# Patient Record
Sex: MALE | Race: WHITE | NOT HISPANIC OR LATINO | Employment: FULL TIME | URBAN - NONMETROPOLITAN AREA
[De-identification: names, ages, dates, MRNs, and addresses within clinical notes are randomized per-mention and may not be internally consistent; named-entity substitution may affect disease eponyms.]

---

## 2019-10-04 ENCOUNTER — OFFICE VISIT (OUTPATIENT)
Dept: INTERNAL MEDICINE | Facility: CLINIC | Age: 44
End: 2019-10-04

## 2019-10-04 VITALS
WEIGHT: 171.6 LBS | OXYGEN SATURATION: 97 % | DIASTOLIC BLOOD PRESSURE: 60 MMHG | TEMPERATURE: 97.5 F | RESPIRATION RATE: 17 BRPM | HEART RATE: 66 BPM | BODY MASS INDEX: 23.24 KG/M2 | SYSTOLIC BLOOD PRESSURE: 102 MMHG | HEIGHT: 72 IN

## 2019-10-04 DIAGNOSIS — R53.83 FATIGUE, UNSPECIFIED TYPE: Primary | ICD-10-CM

## 2019-10-04 DIAGNOSIS — Z86.2 HISTORY OF PERNICIOUS ANEMIA: ICD-10-CM

## 2019-10-04 DIAGNOSIS — Z21 ASYMPTOMATIC HIV INFECTION (HCC): ICD-10-CM

## 2019-10-04 DIAGNOSIS — Z13.220 LIPID SCREENING: ICD-10-CM

## 2019-10-04 DIAGNOSIS — G47.00 INSOMNIA, UNSPECIFIED TYPE: ICD-10-CM

## 2019-10-04 PROCEDURE — 90471 IMMUNIZATION ADMIN: CPT | Performed by: FAMILY MEDICINE

## 2019-10-04 PROCEDURE — 90674 CCIIV4 VAC NO PRSV 0.5 ML IM: CPT | Performed by: FAMILY MEDICINE

## 2019-10-04 PROCEDURE — 99203 OFFICE O/P NEW LOW 30 MIN: CPT | Performed by: FAMILY MEDICINE

## 2019-10-04 NOTE — PROGRESS NOTES
Fei Ojeda is a 44 y.o. male.    Chief Complaint   Patient presents with   • HIV Positive/AIDS     Patient here to establish care, patient is HIV positive and does see an infectious disease doctor at [facility], patient states Doctos is  Carina Gilmore. Patient wanting to discuss possibly seeing a different infectious disease doctor in [place] because he doesn't know how long Dr. Gilmore will be treating him.        HPI   Patient presents today to establish care.  He does have history of HIV and has been following with Dr. Marbella Gilmore.  However, he has recently moved to [place] and may need to establish care with another physician.  He is on an assistance program with a  out of [place].  He is concerned that if he were to change providers to a [place] infectious disease doctor he would need to leave that program.  He is currently taking descovy and tivicay.    Patient also reports significant fatigue.  He does admit to difficulty sleeping.  He does report a history of pernicious anemia as well.  He does report numbness.  He also admits to palpitations and muscle cramps.    The following portions of the patient's history were reviewed and updated as appropriate: allergies, current medications, past family history, past medical history, past social history, past surgical history and problem list.     Past Medical History:   Diagnosis Date   • Acid reflux    • Depression    • Heart murmur    • HIV antibody positive (CMS/[facility])    • Hyperlipidemia        Past Surgical History:   Procedure Laterality Date   • WISDOM TOOTH EXTRACTION         Family History   Problem Relation Age of Onset   • Hypertension Mother    • Diabetes Mother    • Hyperlipidemia Mother    • Arthritis Mother    • Obesity Mother    • Osteoporosis Mother    • Thyroid disease Mother    • COPD Father    • Asthma Maternal Grandmother    • Stroke Paternal Grandmother    • Diabetes Paternal Grandmother        Social History  "    Socioeconomic History   • Marital status: Single     Spouse name: Not on file   • Number of children: Not on file   • Years of education: Not on file   • Highest education level: Not on file   Tobacco Use   • Smoking status: Never Smoker   • Smokeless tobacco: Never Used   Substance and Sexual Activity   • Alcohol use: No     Frequency: Never   • Drug use: No   • Sexual activity: Yes     Partners: Male     Birth control/protection: Condom       Allergies   Allergen Reactions   • Erythromycin GI Intolerance         Current Outpatient Medications:   •  DESCOVY 200-25 MG per tablet, , Disp: , Rfl:   •  TIVICAY 50 MG tablet, , Disp: , Rfl:     ROS    Review of Systems   Constitutional: Positive for fatigue. Negative for chills and fever.   HENT: Positive for rhinorrhea and sore throat. Negative for congestion and postnasal drip.    Eyes: Negative for blurred vision and visual disturbance.   Respiratory: Positive for shortness of breath. Negative for cough and wheezing.    Cardiovascular: Positive for palpitations. Negative for chest pain and leg swelling.   Gastrointestinal: Positive for diarrhea. Negative for abdominal pain, constipation, nausea and vomiting.   Endocrine: Negative for cold intolerance and heat intolerance.   Genitourinary: Negative for dysuria and frequency.   Musculoskeletal: Positive for myalgias. Negative for arthralgias and back pain.   Skin: Negative for color change and rash.   Allergic/Immunologic: Negative for environmental allergies.   Neurological: Positive for numbness. Negative for weakness and headache.   Hematological: Does not bruise/bleed easily.   Psychiatric/Behavioral: Positive for depressed mood. The patient is nervous/anxious.        Vitals:    10/04/19 0828   BP: 102/60   Pulse: 66   Resp: 17   Temp: 97.5 °F (36.4 °C)   SpO2: 97%   Weight: 77.8 kg (171 lb 9.6 oz)   Height: 182.9 cm (72\")     Body mass index is 23.27 kg/m².    Physical Exam     Physical Exam   Constitutional: " He is oriented to person, place, and time. He appears well-developed and well-nourished. No distress.   HENT:   Head: Normocephalic and atraumatic.   Right Ear: Tympanic membrane and external ear normal.   Left Ear: Tympanic membrane and external ear normal.   Mouth/Throat: Oropharynx is clear and moist.   Eyes: Conjunctivae and EOM are normal. Pupils are equal, round, and reactive to light.   Neck: Normal range of motion. Neck supple.   Cardiovascular: Normal rate and regular rhythm.   No murmur heard.  Pulmonary/Chest: Effort normal and breath sounds normal. No respiratory distress. He has no wheezes.   Abdominal: Soft. Bowel sounds are normal. He exhibits no distension. There is no tenderness.   Musculoskeletal: Normal range of motion. He exhibits no edema.   Lymphadenopathy:     He has no cervical adenopathy.   Neurological: He is alert and oriented to person, place, and time. No cranial nerve deficit.   Skin: Skin is warm and dry.   Psychiatric: He has a normal mood and affect. His behavior is normal.       Assessment/Plan    Problem List Items Addressed This Visit     None      Visit Diagnoses     Fatigue, unspecified type    -  Primary    Relevant Orders    CBC & Differential    Comprehensive Metabolic Panel    Vitamin B12    Vitamin D 25 Hydroxy    TSH    T4, Free    Insomnia, unspecified type        History of pernicious anemia        Relevant Orders    Vitamin B12    Lipid screening        Relevant Orders    Lipid Panel    Asymptomatic HIV infection (CMS/[facility])            Patient encouraged to try either Benadryl or 5 to 10 mg of melatonin over-the-counter for insomnia.  If no response, may place on a prescription strength medication.  Basic blood work is being done today for further evaluation of fatigue and additional symptoms.  Discussed with the patient that he should look into Dr. Bynum within [facility] for infectious disease.  It appears he may be at the [place] location and he may be able to keep his  same  at that office.    No orders of the defined types were placed in this encounter.      Orders Placed This Encounter   Procedures   • Flucelvax Quad=>4Years (PFS)       Return if symptoms worsen or fail to improve.      Roseann Keeneer, DO

## 2019-10-11 LAB
25(OH)D3+25(OH)D2 SERPL-MCNC: 22.2 NG/ML (ref 30–100)
ALBUMIN SERPL-MCNC: 4.5 G/DL (ref 3.5–5.2)
ALBUMIN/GLOB SERPL: 2.3 G/DL
ALP SERPL-CCNC: 41 U/L (ref 39–117)
ALT SERPL-CCNC: 14 U/L (ref 1–41)
AST SERPL-CCNC: 15 U/L (ref 1–40)
BASOPHILS # BLD AUTO: 0.07 10*3/MM3 (ref 0–0.2)
BASOPHILS NFR BLD AUTO: 1.3 % (ref 0–1.5)
BILIRUB SERPL-MCNC: 0.3 MG/DL (ref 0.2–1.2)
BUN SERPL-MCNC: 14 MG/DL (ref 6–20)
BUN/CREAT SERPL: 11.9 (ref 7–25)
CALCIUM SERPL-MCNC: 9.1 MG/DL (ref 8.6–10.5)
CHLORIDE SERPL-SCNC: 103 MMOL/L (ref 98–107)
CHOLEST SERPL-MCNC: 166 MG/DL (ref 0–200)
CO2 SERPL-SCNC: 29.3 MMOL/L (ref 22–29)
CREAT SERPL-MCNC: 1.18 MG/DL (ref 0.76–1.27)
EOSINOPHIL # BLD AUTO: 0.15 10*3/MM3 (ref 0–0.4)
EOSINOPHIL NFR BLD AUTO: 2.9 % (ref 0.3–6.2)
ERYTHROCYTE [DISTWIDTH] IN BLOOD BY AUTOMATED COUNT: 12.4 % (ref 12.3–15.4)
GLOBULIN SER CALC-MCNC: 2 GM/DL
GLUCOSE SERPL-MCNC: 103 MG/DL (ref 65–99)
HCT VFR BLD AUTO: 38.5 % (ref 37.5–51)
HDLC SERPL-MCNC: 63 MG/DL (ref 40–60)
HGB BLD-MCNC: 13 G/DL (ref 13–17.7)
IMM GRANULOCYTES # BLD AUTO: 0 10*3/MM3 (ref 0–0.05)
IMM GRANULOCYTES NFR BLD AUTO: 0 % (ref 0–0.5)
LDLC SERPL CALC-MCNC: 78 MG/DL (ref 0–100)
LYMPHOCYTES # BLD AUTO: 2.18 10*3/MM3 (ref 0.7–3.1)
LYMPHOCYTES NFR BLD AUTO: 42 % (ref 19.6–45.3)
MCH RBC QN AUTO: 32.2 PG (ref 26.6–33)
MCHC RBC AUTO-ENTMCNC: 33.8 G/DL (ref 31.5–35.7)
MCV RBC AUTO: 95.3 FL (ref 79–97)
MONOCYTES # BLD AUTO: 0.66 10*3/MM3 (ref 0.1–0.9)
MONOCYTES NFR BLD AUTO: 12.7 % (ref 5–12)
NEUTROPHILS # BLD AUTO: 2.13 10*3/MM3 (ref 1.7–7)
NEUTROPHILS NFR BLD AUTO: 41.1 % (ref 42.7–76)
NRBC BLD AUTO-RTO: 0 /100 WBC (ref 0–0.2)
PLATELET # BLD AUTO: 247 10*3/MM3 (ref 140–450)
POTASSIUM SERPL-SCNC: 5.1 MMOL/L (ref 3.5–5.2)
PROT SERPL-MCNC: 6.5 G/DL (ref 6–8.5)
RBC # BLD AUTO: 4.04 10*6/MM3 (ref 4.14–5.8)
SODIUM SERPL-SCNC: 143 MMOL/L (ref 136–145)
T4 FREE SERPL-MCNC: 1.12 NG/DL (ref 0.93–1.7)
TRIGL SERPL-MCNC: 123 MG/DL (ref 0–150)
TSH SERPL DL<=0.005 MIU/L-ACNC: 2.55 UIU/ML (ref 0.27–4.2)
VIT B12 SERPL-MCNC: 298 PG/ML (ref 211–946)
VLDLC SERPL CALC-MCNC: 24.6 MG/DL
WBC # BLD AUTO: 5.19 10*3/MM3 (ref 3.4–10.8)

## 2019-10-16 DIAGNOSIS — R91.1 LUNG NODULE: Primary | ICD-10-CM

## 2019-10-17 ENCOUNTER — TELEPHONE (OUTPATIENT)
Dept: INTERNAL MEDICINE | Facility: CLINIC | Age: 44
End: 2019-10-17

## 2019-10-17 DIAGNOSIS — I83.93 VARICOSE VEINS OF BOTH LOWER EXTREMITIES, UNSPECIFIED WHETHER COMPLICATED: Primary | ICD-10-CM

## 2019-10-17 NOTE — TELEPHONE ENCOUNTER
Patient would like to try to get appointments with both Dr. Osei and Dr. Messina for the week of Dec. 16-20.  I relayed that it can take several months to obtain an appointment with either provider and we would do our best to accommodate.

## 2019-12-18 ENCOUNTER — OFFICE VISIT (OUTPATIENT)
Dept: PULMONOLOGY | Facility: CLINIC | Age: 44
End: 2019-12-18

## 2019-12-18 VITALS
HEIGHT: 72 IN | HEART RATE: 64 BPM | DIASTOLIC BLOOD PRESSURE: 68 MMHG | WEIGHT: 159 LBS | SYSTOLIC BLOOD PRESSURE: 112 MMHG | OXYGEN SATURATION: 95 % | BODY MASS INDEX: 21.54 KG/M2 | RESPIRATION RATE: 16 BRPM

## 2019-12-18 DIAGNOSIS — R91.1 LUNG NODULE, SOLITARY: Primary | ICD-10-CM

## 2019-12-18 DIAGNOSIS — R93.89 ABNORMAL CT OF THE CHEST: ICD-10-CM

## 2019-12-18 PROCEDURE — 99204 OFFICE O/P NEW MOD 45 MIN: CPT | Performed by: INTERNAL MEDICINE

## 2019-12-18 RX ORDER — FLUCONAZOLE 200 MG/1
TABLET ORAL
COMMUNITY
Start: 2019-12-17 | End: 2020-01-18

## 2019-12-18 RX ORDER — ATENOLOL 25 MG/1
TABLET ORAL
COMMUNITY
End: 2020-01-18

## 2019-12-18 RX ORDER — BICTEGRAVIR SODIUM, EMTRICITABINE, AND TENOFOVIR ALAFENAMIDE FUMARATE 50; 200; 25 MG/1; MG/1; MG/1
TABLET ORAL
COMMUNITY
Start: 2019-12-04 | End: 2022-07-23 | Stop reason: SDUPTHER

## 2019-12-18 NOTE — PROGRESS NOTES
CONSULT NOTE    Requested by:   Roseann Harris, *   Roseann Harris, DO      Chief Complaint   Patient presents with   • Consult   • Breathing Problem       Subjective:  eFi Ojeda is a 44 y.o. male.     History of Present Illness   Patient comes in today for consultation because of abnormal CT.    Patient underwent a CT due to shortness of breath and other vague symptoms. he was told that the imaging study showed a 3 and another 5 millimeter lung nodule for which a pulmonology consultation was requested.    He has had 2 CT scans since 2017. One in [place] in early 2018.     Patient says that he is a non-smoker. He used to smoke 1 PPD or so, for total of 10 years but quit smoking in 2017. The patient describes no family members with a history of lung cancer.        The following portions of the patient's history were reviewed and updated as appropriate: allergies, current medications, past family history, past medical history, past social history and past surgical history.    Review of Systems   Constitutional: Negative for chills, fatigue and fever.   HENT: Positive for ear pain, postnasal drip, sinus pressure and tinnitus. Negative for sneezing and sore throat.    Respiratory: Positive for chest tightness. Negative for cough, shortness of breath and wheezing.    Cardiovascular: Positive for palpitations. Negative for leg swelling.   Psychiatric/Behavioral: Positive for sleep disturbance.   All other systems reviewed and are negative.      Past Medical History:   Diagnosis Date   • Acid reflux    • Depression    • Heart murmur    • HIV antibody positive (CMS/[facility])    • Hyperlipidemia        Social History     Tobacco Use   • Smoking status: Former Smoker     Packs/day: 2.00     Years: 7.00     Pack years: 14.00     Types: Cigarettes     Last attempt to quit: 2017     Years since quittin.9   • Smokeless tobacco: Never Used   Substance Use Topics   • Alcohol use: No     Frequency: Never  "        Objective:  Visit Vitals  /68   Pulse 64   Resp 16   Ht 182.9 cm (72.01\")   Wt 72.1 kg (159 lb)   SpO2 95%   BMI 21.56 kg/m²       Physical Exam   Constitutional: He is oriented to person, place, and time. He appears well-developed and well-nourished.   HENT:   Head: Atraumatic.   Eyes: EOM are normal.   Neck: Neck supple. No JVD present. No thyromegaly present.   Cardiovascular: Normal rate and regular rhythm.   No murmur heard.  Pulmonary/Chest: Effort normal. No respiratory distress. He has no wheezes. He has no rales.   Musculoskeletal:   Gait was normal.   Neurological: He is alert and oriented to person, place, and time.   Skin: Skin is warm and dry.   Psychiatric: He has a normal mood and affect. His behavior is normal.   Vitals reviewed.      Assessment/Plan:  Fei was seen today for consult and breathing problem.    Diagnoses and all orders for this visit:    Lung nodule, solitary  -     CT Chest Without Contrast; Future    Abnormal CT of the chest  -     CT Chest Without Contrast; Future        Return in about 7 weeks (around 2/5/2020) for Recheck, Imaging, For Trudy (7-9 weeks is OK).    DISCUSSION(if any):  Last CT scan was reviewed in great detail with the patient. Images reviewed personally.       Based on the history obtained today, and based on the latest guidelines, the patient will need a repeat CT chest in Late Jan 2020.      Based on the results of the CT scan, further recommendations will be made.    The patient was asked to call this office if he develops any weight loss, hemoptysis, night sweats etc.        Dictated utilizing Dragon dictation.    This document was electronically signed by Emma Osei MD on 12/18/19 at 10:05 AM    "

## 2020-01-03 ENCOUNTER — TELEPHONE (OUTPATIENT)
Dept: INTERNAL MEDICINE | Facility: CLINIC | Age: 45
End: 2020-01-03

## 2020-01-03 DIAGNOSIS — I83.93 VARICOSE VEINS OF BOTH LOWER EXTREMITIES, UNSPECIFIED WHETHER COMPLICATED: Primary | ICD-10-CM

## 2020-01-03 NOTE — TELEPHONE ENCOUNTER
"I just took a call from Celina at [facility].  She states that they need a referral for an Ultrasound of the Lower Extremities for Vericose Veins.  You had previously placed a referral to Dr. Frazier (at the patient's request) however, he is \"not taking any new patients\" so they were going to do the patient's Ultrasound and refer him on.  I told Celina that I would have to let you know this information and find out if you would be willing to order an Ultrasound for the patient, especially since you didn't evaluate him for this issue and placed the referral to [facility] at the patient's request.  Celina states she is going to call the patient and let him know that they would have to wait on our order, as patient had been scheduled for Ultrasound on Monday (1/6/20).  "

## 2020-01-07 NOTE — TELEPHONE ENCOUNTER
So they did not order the ultrasound?  I went ahead and placed the order.  It could be done here if he would prefer.  Just clarifying, they are referring him to another provider accepting patients?

## 2020-01-08 NOTE — TELEPHONE ENCOUNTER
So my understanding is that Dr. Frazier is basically screening patients by doing ultrasounds to determine if there is any need for scheduling an appointment or not.  At that point they are either scheduling the patient with Dr. Frazier or referring out to another physician, I'm not sure.

## 2020-01-09 ENCOUNTER — TELEPHONE (OUTPATIENT)
Dept: INTERNAL MEDICINE | Facility: CLINIC | Age: 45
End: 2020-01-09

## 2020-01-09 NOTE — TELEPHONE ENCOUNTER
PATIENT CALLED IN REGARDS TO VASCULAR REFERRAL TO [facility].  NEEDS TO REFLECT AN ULTRA SOUND WITH DR. ROACH.     PATIENT CALL BACK NUMBER [phone]

## 2020-01-18 ENCOUNTER — OFFICE VISIT (OUTPATIENT)
Dept: INTERNAL MEDICINE | Facility: CLINIC | Age: 45
End: 2020-01-18

## 2020-01-18 ENCOUNTER — PATIENT MESSAGE (OUTPATIENT)
Dept: INTERNAL MEDICINE | Facility: CLINIC | Age: 45
End: 2020-01-18

## 2020-01-18 VITALS
HEART RATE: 77 BPM | SYSTOLIC BLOOD PRESSURE: 104 MMHG | DIASTOLIC BLOOD PRESSURE: 64 MMHG | OXYGEN SATURATION: 97 % | BODY MASS INDEX: 21.94 KG/M2 | HEIGHT: 72 IN | WEIGHT: 162 LBS | TEMPERATURE: 98.8 F

## 2020-01-18 DIAGNOSIS — Z11.1 ENCOUNTER FOR PPD TEST: Primary | ICD-10-CM

## 2020-01-18 DIAGNOSIS — J30.9 CHRONIC ALLERGIC RHINITIS: Primary | ICD-10-CM

## 2020-01-18 DIAGNOSIS — L98.9 LESION OF SKIN OF NOSE: ICD-10-CM

## 2020-01-18 PROCEDURE — 99214 OFFICE O/P EST MOD 30 MIN: CPT | Performed by: FAMILY MEDICINE

## 2020-01-18 RX ORDER — METRONIDAZOLE 10 MG/G
GEL TOPICAL DAILY
Qty: 60 G | Refills: 0 | Status: SHIPPED | OUTPATIENT
Start: 2020-01-18 | End: 2020-01-20 | Stop reason: SDUPTHER

## 2020-01-18 RX ORDER — LEVOCETIRIZINE DIHYDROCHLORIDE 5 MG/1
5 TABLET, FILM COATED ORAL EVERY EVENING
Qty: 30 TABLET | Refills: 2 | Status: SHIPPED | OUTPATIENT
Start: 2020-01-18 | End: 2020-04-17

## 2020-01-18 RX ORDER — OLOPATADINE HYDROCHLORIDE 665 UG/1
2 SPRAY NASAL 2 TIMES DAILY
Qty: 1 BOTTLE | Refills: 2 | Status: SHIPPED | OUTPATIENT
Start: 2020-01-18 | End: 2020-04-17

## 2020-01-18 NOTE — PROGRESS NOTES
Fei Ojeda is a 44 y.o. male.    Chief Complaint   Patient presents with   • Rash     on nose       HPI   Patient reports severe sinus drainage for a couple of months.  He reports he is always had sinus issues.  He admits to sinus pressure and ear fullness.  He states that he recently took a round of antibiotics and steroids without much improvement.  He reports being miserable.  He has tried multiple allergy medications including Flonase, Xyzal, Claritin, Allegra, Sudafed, Zyrtec, Astelin, and Singulair.  He reports that he has tried combinations of these medications.      He reports a redness to the tip of his left nare.  This is been present for the last several weeks.  He has been using Bactroban ointment without any improvement.  As above, he also took a round of antibiotics recently without any improvement.    The following portions of the patient's history were reviewed and updated as appropriate: allergies, current medications, past family history, past medical history, past social history, past surgical history and problem list.     Allergies   Allergen Reactions   • Erythromycin GI Intolerance         Current Outpatient Medications:   •  BIKTARVY -25 MG per tablet, , Disp: , Rfl:   •  mupirocin (BACTROBAN) 2 % ointment, Apply  topically to the appropriate area as directed 3 (Three) Times a Day., Disp: , Rfl:   •  levocetirizine (XYZAL) 5 MG tablet, Take 1 tablet by mouth Every Evening., Disp: 30 tablet, Rfl: 2  •  metroNIDAZOLE (METROGEL) 1 % gel, Apply  topically to the appropriate area as directed Daily., Disp: 60 g, Rfl: 0  •  olopatadine (PATANASE) 0.6 % solution nasal solution, 2 sprays by Each Nare route 2 (Two) Times a Day., Disp: 1 bottle, Rfl: 2    ROS    Review of Systems   Constitutional: Negative for chills and fever.   HENT: Positive for congestion, postnasal drip and sinus pressure.    Respiratory: Negative for shortness of breath.    Cardiovascular: Negative for chest pain.  "  Gastrointestinal: Negative for abdominal pain, diarrhea, nausea and vomiting.   Skin: Positive for skin lesions.       Vitals:    01/18/20 0918   BP: 104/64   BP Location: Left arm   Patient Position: Sitting   Cuff Size: Adult   Pulse: 77   Temp: 98.8 °F (37.1 °C)   TempSrc: Temporal   SpO2: 97%   Weight: 73.5 kg (162 lb)   Height: 182.9 cm (72\")     Body mass index is 21.97 kg/m².    Physical Exam     Physical Exam   Constitutional: He is oriented to person, place, and time. He appears well-developed and well-nourished. No distress.   HENT:   Head: Normocephalic and atraumatic.   Right Ear: Tympanic membrane and external ear normal.   Left Ear: Tympanic membrane and external ear normal.   Mouth/Throat: Posterior oropharyngeal erythema (Trace postnasal drip) present.   Eyes: Conjunctivae and EOM are normal.   Cardiovascular: Normal rate and regular rhythm.   No murmur heard.  Pulmonary/Chest: Effort normal and breath sounds normal. No respiratory distress. He has no wheezes.   Abdominal: Soft. Bowel sounds are normal. He exhibits no distension. There is no tenderness.   Neurological: He is alert and oriented to person, place, and time. No cranial nerve deficit.   Skin: Skin is warm and dry. There is erythema (Small erythematous area to the tip of the nose on the left.  Telangiectasias present.).   Psychiatric: He has a normal mood and affect.       Assessment/Plan    Problem List Items Addressed This Visit        Respiratory    Chronic allergic rhinitis - Primary     Patient has had chronic sinus congestion.  Will refer to ENT for further evaluation and treatment.  In the meantime, patient has been advised to take Xyzal and Patanase nasal spray.         Relevant Orders    Ambulatory Referral to ENT (Otolaryngology) (Completed)       Musculoskeletal and Integument    Lesion of skin of nose     Skin lesion appears to be similar to rosacea.  Will treat with topical MetroGel.         Relevant Medications    mupirocin " (BACTROBAN) 2 % ointment    metroNIDAZOLE (METROGEL) 1 % gel          New Medications Ordered This Visit   Medications   • metroNIDAZOLE (METROGEL) 1 % gel     Sig: Apply  topically to the appropriate area as directed Daily.     Dispense:  60 g     Refill:  0   • olopatadine (PATANASE) 0.6 % solution nasal solution     Si sprays by Each Nare route 2 (Two) Times a Day.     Dispense:  1 bottle     Refill:  2   • levocetirizine (XYZAL) 5 MG tablet     Sig: Take 1 tablet by mouth Every Evening.     Dispense:  30 tablet     Refill:  2       No orders of the defined types were placed in this encounter.      Return if symptoms worsen or fail to improve.    Roseann Harris, DO

## 2020-01-18 NOTE — ASSESSMENT & PLAN NOTE
Patient has had chronic sinus congestion.  Will refer to ENT for further evaluation and treatment.  In the meantime, patient has been advised to take Xyzal and Patanase nasal spray.

## 2020-01-20 ENCOUNTER — TELEPHONE (OUTPATIENT)
Dept: INTERNAL MEDICINE | Facility: CLINIC | Age: 45
End: 2020-01-20

## 2020-01-20 RX ORDER — METRONIDAZOLE 10 MG/G
GEL TOPICAL
Qty: 60 G | Refills: 0 | Status: SHIPPED | OUTPATIENT
Start: 2020-01-20 | End: 2022-07-06

## 2020-01-20 NOTE — TELEPHONE ENCOUNTER
Gerri Bowles left a VM 1/18/2020 at 10AM requesting update on RX for metrogel, states they need to know where this is going to be applied at so that they can bill insurance.   Resent.

## 2020-01-21 NOTE — TELEPHONE ENCOUNTER
From: Fei Ojeda  To: Roseann Harris DO  Sent: 1/18/2020 7:55 PM EST  Subject: Non-Urgent Medical Question    Dr. Harris:    I meant to ask you at my visit today 01/18/2020 if I could get my TB skin test. I have not had one for a couple of years and wondered if you would order one for me. Let me know. Thank you.

## 2020-01-27 ENCOUNTER — HOSPITAL ENCOUNTER (OUTPATIENT)
Dept: CT IMAGING | Facility: HOSPITAL | Age: 45
Discharge: HOME | End: 2020-01-27
Admitting: INTERNAL MEDICINE

## 2020-01-27 ENCOUNTER — CLINICAL SUPPORT (OUTPATIENT)
Dept: INTERNAL MEDICINE | Facility: CLINIC | Age: 45
End: 2020-01-27

## 2020-01-27 DIAGNOSIS — R93.89 ABNORMAL CT OF THE CHEST: ICD-10-CM

## 2020-01-27 DIAGNOSIS — R91.1 LUNG NODULE, SOLITARY: ICD-10-CM

## 2020-01-27 PROCEDURE — 71250 CT THORAX DX C-: CPT

## 2020-01-27 PROCEDURE — 86580 TB INTRADERMAL TEST: CPT | Performed by: FAMILY MEDICINE

## 2020-01-29 ENCOUNTER — CLINICAL SUPPORT (OUTPATIENT)
Dept: INTERNAL MEDICINE | Facility: CLINIC | Age: 45
End: 2020-01-29

## 2020-01-29 LAB
INDURATION: 0 MM (ref 0–10)
Lab: NORMAL
Lab: NORMAL
TB SKIN TEST: NEGATIVE

## 2020-02-03 ENCOUNTER — TELEPHONE (OUTPATIENT)
Dept: INTERNAL MEDICINE | Facility: CLINIC | Age: 45
End: 2020-02-03

## 2020-02-03 ENCOUNTER — OFFICE VISIT (OUTPATIENT)
Dept: INTERNAL MEDICINE | Facility: CLINIC | Age: 45
End: 2020-02-03

## 2020-02-03 VITALS
WEIGHT: 164.8 LBS | HEART RATE: 58 BPM | OXYGEN SATURATION: 98 % | BODY MASS INDEX: 22.32 KG/M2 | DIASTOLIC BLOOD PRESSURE: 54 MMHG | TEMPERATURE: 98.4 F | HEIGHT: 72 IN | SYSTOLIC BLOOD PRESSURE: 98 MMHG

## 2020-02-03 DIAGNOSIS — I83.813 VARICOSE VEINS OF BOTH LOWER EXTREMITIES WITH PAIN: ICD-10-CM

## 2020-02-03 DIAGNOSIS — I83.93 VARICOSE VEINS OF BOTH LOWER EXTREMITIES, UNSPECIFIED WHETHER COMPLICATED: Primary | ICD-10-CM

## 2020-02-03 DIAGNOSIS — F41.9 ANXIETY: Primary | ICD-10-CM

## 2020-02-03 DIAGNOSIS — R00.2 PALPITATIONS: ICD-10-CM

## 2020-02-03 DIAGNOSIS — R07.89 OTHER CHEST PAIN: ICD-10-CM

## 2020-02-03 PROCEDURE — 99214 OFFICE O/P EST MOD 30 MIN: CPT | Performed by: FAMILY MEDICINE

## 2020-02-03 RX ORDER — PROPRANOLOL HYDROCHLORIDE 10 MG/1
10 TABLET ORAL 3 TIMES DAILY PRN
Qty: 90 TABLET | Refills: 0 | Status: SHIPPED | OUTPATIENT
Start: 2020-02-03 | End: 2022-07-06

## 2020-02-03 NOTE — PROGRESS NOTES
Fei Ojeda is a 44 y.o. male.    Chief Complaint   Patient presents with   • Chest Tightness       HPI   Patient presents today to f/u from a visit to the urgent care.  Patient reports chest tightness and pressure upon waking yesterday.  He states while he was in the shower his heart began racing and had palpitations. He reports shortness of breath.  He reports he went to the urgent care. He reports EKG was normal along with normal CXR.  He reports symptoms subsided after leaving the .  He had elevated heart rate this am in the shower as well.  He states he doesn't necessarily feel nervous, but is starting a new job as a  with the [employer].      Patient also has varicose veins.  He was referred to Dr. Mitchell with vascular surgery at the patient's request. Dr. Mitchell is retiring and asked for an ultrasound to be ordered and he would read it.  This clinic was informed by the vascular office that the patient would be referred by their office to another location depending on the read from the ultrasound.  The patient was able to read his results on his patient portal, but no ne from the vascular office has called the patient to go over the results or discuss how they were going to proceed. The patient is aware his venous ultrasound of the lower extremities is abnormal bilaterally.     The following portions of the patient's history were reviewed and updated as appropriate: allergies, current medications, past family history, past medical history, past social history, past surgical history and problem list.     Allergies   Allergen Reactions   • Erythromycin GI Intolerance         Current Outpatient Medications:   •  BIKTARVY -25 MG per tablet, , Disp: , Rfl:   •  levocetirizine (XYZAL) 5 MG tablet, Take 1 tablet by mouth Every Evening., Disp: 30 tablet, Rfl: 2  •  metroNIDAZOLE (METROGEL) 1 % gel, Apply topically to nose daily, Disp: 60 g, Rfl: 0  •  mupirocin (BACTROBAN) 2 % ointment, Apply   "topically to the appropriate area as directed 3 (Three) Times a Day., Disp: , Rfl:   •  olopatadine (PATANASE) 0.6 % solution nasal solution, 2 sprays by Each Nare route 2 (Two) Times a Day., Disp: 1 bottle, Rfl: 2  •  propranolol (INDERAL) 10 MG tablet, Take 1 tablet by mouth 3 (Three) Times a Day As Needed (anxiety, palpitations)., Disp: 90 tablet, Rfl: 0    ROS    Review of Systems   Constitutional: Negative for chills and fever.   HENT: Negative for congestion and rhinorrhea.    Respiratory: Positive for shortness of breath. Negative for cough and wheezing.    Cardiovascular: Positive for chest pain and palpitations.   Gastrointestinal: Negative for abdominal pain, diarrhea, nausea and vomiting.   Musculoskeletal: Positive for myalgias.   Psychiatric/Behavioral: Positive for stress. The patient is nervous/anxious.        Vitals:    02/03/20 1100   BP: 98/54   BP Location: Left arm   Patient Position: Sitting   Cuff Size: Adult   Pulse: 58   Temp: 98.4 °F (36.9 °C)   TempSrc: Temporal   SpO2: 98%   Weight: 74.8 kg (164 lb 12.8 oz)   Height: 182.9 cm (72\")     Body mass index is 22.35 kg/m².    Physical Exam     Physical Exam   Constitutional: He is oriented to person, place, and time. He appears well-developed and well-nourished. No distress.   HENT:   Head: Normocephalic and atraumatic.   Right Ear: External ear normal.   Left Ear: External ear normal.   Eyes: Conjunctivae and EOM are normal.   Cardiovascular: Normal rate and regular rhythm.   No murmur heard.  Pulmonary/Chest: Effort normal and breath sounds normal. No respiratory distress. He has no wheezes.   Abdominal: Soft. Bowel sounds are normal. He exhibits no distension. There is no tenderness.   Musculoskeletal: He exhibits no edema.   Neurological: He is alert and oriented to person, place, and time. No cranial nerve deficit.   Skin: Skin is warm and dry.   Psychiatric: He has a normal mood and affect. His behavior is normal. "       Assessment/Plan    Problem List Items Addressed This Visit        Cardiovascular and Mediastinum    Palpitations    Varicose veins of both lower extremities with pain       Nervous and Auditory    Other chest pain       Other    Anxiety - Primary        Discussed with patient chest pain and palpitations are likely secondary to underlying anxiety.  I do believe the patient is having anxiety attacks.  EKG in UC was unremarkable.  Advised to try propranolol prn if the episodes should occur again.  If no response, may consider holter monitor to further evaluated.      Vascular ultrasound has been reviewed and discussed with the patient. Patient is also being referred to  vascular surgery for further evaluation and treatment of varicose veins and venous insufficiency (based off recent lower extremity ultrasound).    New Medications Ordered This Visit   Medications   • propranolol (INDERAL) 10 MG tablet     Sig: Take 1 tablet by mouth 3 (Three) Times a Day As Needed (anxiety, palpitations).     Dispense:  90 tablet     Refill:  0       No orders of the defined types were placed in this encounter.      No follow-ups on file.    Roseann Harris, DO

## 2020-02-03 NOTE — TELEPHONE ENCOUNTER
We received a call back from Jailene at [facility].  Per Jailene, patient was given a disc of the abnormal Lower Vascular Duplex and told to talk to someone at .  But states that there was no formal referral placed for the patient to see anyone.

## 2020-02-03 NOTE — TELEPHONE ENCOUNTER
Called [facility] and left message on voicemail of Jailene Stallings (Clinical Staff for Dr. Frazier).  Requested a call back with information regarding whether patient had been referred elsewhere following his abnormal Lower Venous Duplex.

## 2020-02-04 NOTE — TELEPHONE ENCOUNTER
It seems so.  I wasn't sure if the patient may have said anything about what they had instructed him to do.  But yes, I'd say the next step would be to place a referral, probably [facility] would be the best option.

## 2020-02-05 NOTE — TELEPHONE ENCOUNTER
Called patient with no answer, left a voicemail informing patient that a new referral has been placed for vascular surgery at

## 2020-02-09 PROBLEM — R07.89 OTHER CHEST PAIN: Status: ACTIVE | Noted: 2020-02-09

## 2020-02-09 PROBLEM — I83.813 VARICOSE VEINS OF BOTH LOWER EXTREMITIES WITH PAIN: Status: ACTIVE | Noted: 2020-02-09

## 2020-02-10 ENCOUNTER — OFFICE VISIT (OUTPATIENT)
Dept: PULMONOLOGY | Facility: CLINIC | Age: 45
End: 2020-02-10

## 2020-02-10 VITALS
BODY MASS INDEX: 22.21 KG/M2 | WEIGHT: 164 LBS | DIASTOLIC BLOOD PRESSURE: 68 MMHG | HEART RATE: 56 BPM | RESPIRATION RATE: 18 BRPM | SYSTOLIC BLOOD PRESSURE: 112 MMHG | HEIGHT: 72 IN | OXYGEN SATURATION: 98 %

## 2020-02-10 DIAGNOSIS — R93.89 ABNORMAL CT OF THE CHEST: ICD-10-CM

## 2020-02-10 DIAGNOSIS — R91.1 LUNG NODULE, SOLITARY: Primary | ICD-10-CM

## 2020-02-10 PROCEDURE — 99213 OFFICE O/P EST LOW 20 MIN: CPT | Performed by: NURSE PRACTITIONER

## 2020-02-10 NOTE — PROGRESS NOTES
"Chief Complaint   Patient presents with   • Follow-up   • Breathing Problem         Subjective   Fei Ojeda is a 44 y.o. male.     History of Present Illness   The patient comes in today for follow-up of lung nodule.    He denies having any shortness of breath or cough at this time.  He states he had one episode of shortness of breath and went to urgent treatment where he had an EKG completed.  They did not feel he was having any respiratory issues when he was seen there.  He has not had any further episodes of shortness of breath.    He complains of some sinus drainage and uses Patanase nasal spray and Xyzal.    He quit smoking in 2017.    The following portions of the patient's history were reviewed and updated as appropriate: allergies, current medications, past family history, past medical history, past social history and past surgical history.    Review of Systems   HENT: Positive for rhinorrhea, sinus pressure, sneezing and sore throat.    Respiratory: Positive for shortness of breath. Negative for cough and wheezing.        Objective   Visit Vitals  /68   Pulse 56   Resp 18   Ht 182.9 cm (72\")   Wt 74.4 kg (164 lb)   SpO2 98%   BMI 22.24 kg/m²     Physical Exam   Constitutional: He is oriented to person, place, and time.   HENT:   Head: Atraumatic.   Eyes: EOM are normal.   Neck: Neck supple.   Cardiovascular: Normal rate and regular rhythm.   Pulmonary/Chest: Effort normal. No respiratory distress.   Somewhat decreased A/E without wheezing noted.   Musculoskeletal: He exhibits no edema.   Gait normal.   Neurological: He is alert and oriented to person, place, and time.   Psychiatric: He has a normal mood and affect.   Vitals reviewed.      Assessment/Plan   Fei was seen today for follow-up and breathing problem.    Diagnoses and all orders for this visit:    Lung nodule, solitary    Abnormal CT of the chest         Return in about 8 months (around 10/10/2020) for Recheck, For Dr." Sea.    DISCUSSION (if any):  I reviewed the patient's CT scan from January 27, 2020 which did not show any change in the 4 mm nodule in the left upper lobe.  There is a 2 mm nodule in the left lower lobe which is unchanged as well.  He will need to have a follow-up CT in 1 year to ensure stability.  This will be ordered at his follow-up visit.    I have told him to call the office if he has any further episodes of shortness of breath.    Dictated utilizing Dragon dictation.    This document was electronically signed by ASHWINI Singer February 10, 2020  3:30 PM

## 2020-04-17 ENCOUNTER — TELEMEDICINE (OUTPATIENT)
Dept: INTERNAL MEDICINE | Facility: CLINIC | Age: 45
End: 2020-04-17

## 2020-04-17 DIAGNOSIS — E55.9 VITAMIN D DEFICIENCY: ICD-10-CM

## 2020-04-17 DIAGNOSIS — E16.2 HYPOGLYCEMIA: ICD-10-CM

## 2020-04-17 DIAGNOSIS — R53.83 FATIGUE, UNSPECIFIED TYPE: ICD-10-CM

## 2020-04-17 DIAGNOSIS — J30.9 ALLERGIC RHINITIS, UNSPECIFIED SEASONALITY, UNSPECIFIED TRIGGER: Primary | ICD-10-CM

## 2020-04-17 PROCEDURE — 99214 OFFICE O/P EST MOD 30 MIN: CPT | Performed by: FAMILY MEDICINE

## 2020-04-17 RX ORDER — FLUNISOLIDE 0.25 MG/ML
2 SOLUTION NASAL EVERY 12 HOURS
Qty: 1 BOTTLE | Refills: 2 | Status: SHIPPED | OUTPATIENT
Start: 2020-04-17 | End: 2020-06-16

## 2020-04-17 RX ORDER — LANCETS
EACH MISCELLANEOUS
Qty: 100 EACH | Refills: 12 | Status: SHIPPED | OUTPATIENT
Start: 2020-04-17 | End: 2022-07-06

## 2020-04-17 RX ORDER — BLOOD-GLUCOSE METER
1 KIT MISCELLANEOUS DAILY
Qty: 1 EACH | Refills: 0 | Status: SHIPPED | OUTPATIENT
Start: 2020-04-17 | End: 2022-07-06

## 2020-04-17 NOTE — PROGRESS NOTES
"Fei Ojeda is a 44 y.o. male.    Chief Complaint   Patient presents with   • Allergies     You have chosen to receive care through a telehealth visit.  Do you consent to use a video/audio connection for your medical care today? Yes    Medications and allergies reviewed today by myself.     HPI   Patient complains of sinus problems ongoing for a long time.  He has recently finished a round of steroids and azithromycin with some improvement, but symptoms returned.  He reports he had the same experience with augmentin.  He has tried multiple allergy medications, including astelin, pataday, flonase, singulair, multiple oral antihistamines, sinus rinses.  He is seeing ENT as well.  He has not done allergy testing just yet.      Patient also complains of fatigue.  He reports he wakes up exhausted and energy improves as the day goes on. He has Vitamin D deficiency and takes 700iu in a multivitamin.  He reports he has a h/o a slightly elevated A1C, but was not considered diabetic.  He has been feeling very shaky, faint, and \"swimmy headed\" first thing in the morning.  He was concerned he may be having hypoglycemic episodes as he does feel better once he is able to eat something.     The following portions of the patient's history were reviewed and updated as appropriate: allergies, current medications, past family history, past medical history, past social history, past surgical history and problem list.     Allergies   Allergen Reactions   • Erythromycin GI Intolerance         Current Outpatient Medications:   •  BIKTARVY -25 MG per tablet, , Disp: , Rfl:   •  flunisolide (NASALIDE) 25 MCG/ACT (0.025%) solution nasal spray, Inhale 2 sprays Every 12 (Twelve) Hours., Disp: 1 bottle, Rfl: 2  •  glucose blood test strip, E16.2 Use as instructed daily,, Disp: 100 each, Rfl: 12  •  glucose monitor monitoring kit, 1 each Daily. E16.2, Disp: 1 each, Rfl: 0  •  Lancets (ACCU-CHEK MULTICLIX) lancets, For daily testing, " E16.2, Disp: 100 each, Rfl: 12  •  metroNIDAZOLE (METROGEL) 1 % gel, Apply topically to nose daily, Disp: 60 g, Rfl: 0  •  propranolol (INDERAL) 10 MG tablet, Take 1 tablet by mouth 3 (Three) Times a Day As Needed (anxiety, palpitations)., Disp: 90 tablet, Rfl: 0    ROS    Review of Systems   Constitutional: Positive for fatigue. Negative for chills and fever.   HENT: Positive for congestion, postnasal drip, rhinorrhea and sinus pressure.    Respiratory: Negative for cough and shortness of breath.    Cardiovascular: Negative for chest pain.   Gastrointestinal: Negative for diarrhea, nausea and vomiting.       There were no vitals filed for this visit.  There is no height or weight on file to calculate BMI.    Physical Exam     Physical Exam   Constitutional: He is oriented to person, place, and time. He appears well-developed and well-nourished. No distress.   HENT:   Head: Normocephalic and atraumatic.   Right Ear: External ear normal.   Left Ear: External ear normal.   Eyes: Conjunctivae and EOM are normal.   Cardiovascular:   No murmur heard.  Pulmonary/Chest: Effort normal. No respiratory distress.   Neurological: He is alert and oriented to person, place, and time. No cranial nerve deficit.   Skin: No pallor.   Psychiatric: He has a normal mood and affect. His behavior is normal.       Assessment/Plan    Problem List Items Addressed This Visit     None      Visit Diagnoses     Allergic rhinitis, unspecified seasonality, unspecified trigger    -  Primary    Fatigue, unspecified type        Relevant Orders    CBC & Differential    Comprehensive Metabolic Panel    Hemoglobin A1c    Vitamin D 25 Hydroxy    Folate    Vitamin B12    TSH    Hypoglycemia        Relevant Orders    Hemoglobin A1c    Vitamin D deficiency        Relevant Orders    Vitamin D 25 Hydroxy        Encouraged to take xyzal, oloptadine, and nasalide all together until he is able to see ENT again.  Will obtain labs to further evaluate suspected  hypoglycemia and fatigue.     New Medications Ordered This Visit   Medications   • flunisolide (NASALIDE) 25 MCG/ACT (0.025%) solution nasal spray     Sig: Inhale 2 sprays Every 12 (Twelve) Hours.     Dispense:  1 bottle     Refill:  2   • glucose blood test strip     Sig: E16.2 Use as instructed daily,     Dispense:  100 each     Refill:  12   • glucose monitor monitoring kit     Si each Daily. E16.2     Dispense:  1 each     Refill:  0   • Lancets (ACCU-CHEK MULTICLIX) lancets     Sig: For daily testing, E16.2     Dispense:  100 each     Refill:  12       No orders of the defined types were placed in this encounter.      No follow-ups on file.    Roseann Harris, DO

## 2020-04-21 LAB
25(OH)D3+25(OH)D2 SERPL-MCNC: 27.9 NG/ML (ref 30–100)
ALBUMIN SERPL-MCNC: 4.7 G/DL (ref 3.5–5.2)
ALBUMIN/GLOB SERPL: 1.7 G/DL
ALP SERPL-CCNC: 43 U/L (ref 39–117)
ALT SERPL-CCNC: 13 U/L (ref 1–41)
AST SERPL-CCNC: 19 U/L (ref 1–40)
BASOPHILS # BLD AUTO: 0.09 10*3/MM3 (ref 0–0.2)
BASOPHILS NFR BLD AUTO: 1.2 % (ref 0–1.5)
BILIRUB SERPL-MCNC: 0.3 MG/DL (ref 0.2–1.2)
BUN SERPL-MCNC: 15 MG/DL (ref 6–20)
BUN/CREAT SERPL: 14 (ref 7–25)
CALCIUM SERPL-MCNC: 9.5 MG/DL (ref 8.6–10.5)
CHLORIDE SERPL-SCNC: 97 MMOL/L (ref 98–107)
CO2 SERPL-SCNC: 28.8 MMOL/L (ref 22–29)
CREAT SERPL-MCNC: 1.07 MG/DL (ref 0.76–1.27)
EOSINOPHIL # BLD AUTO: 0.25 10*3/MM3 (ref 0–0.4)
EOSINOPHIL NFR BLD AUTO: 3.2 % (ref 0.3–6.2)
ERYTHROCYTE [DISTWIDTH] IN BLOOD BY AUTOMATED COUNT: 12.4 % (ref 12.3–15.4)
FOLATE SERPL-MCNC: 15.2 NG/ML (ref 4.78–24.2)
GLOBULIN SER CALC-MCNC: 2.8 GM/DL
GLUCOSE SERPL-MCNC: 95 MG/DL (ref 65–99)
HBA1C MFR BLD: 5.2 % (ref 4.8–5.6)
HCT VFR BLD AUTO: 42.4 % (ref 37.5–51)
HGB BLD-MCNC: 14.6 G/DL (ref 13–17.7)
IMM GRANULOCYTES # BLD AUTO: 0.01 10*3/MM3 (ref 0–0.05)
IMM GRANULOCYTES NFR BLD AUTO: 0.1 % (ref 0–0.5)
LYMPHOCYTES # BLD AUTO: 2.98 10*3/MM3 (ref 0.7–3.1)
LYMPHOCYTES NFR BLD AUTO: 38.4 % (ref 19.6–45.3)
MCH RBC QN AUTO: 33.2 PG (ref 26.6–33)
MCHC RBC AUTO-ENTMCNC: 34.4 G/DL (ref 31.5–35.7)
MCV RBC AUTO: 96.4 FL (ref 79–97)
MONOCYTES # BLD AUTO: 0.84 10*3/MM3 (ref 0.1–0.9)
MONOCYTES NFR BLD AUTO: 10.8 % (ref 5–12)
NEUTROPHILS # BLD AUTO: 3.6 10*3/MM3 (ref 1.7–7)
NEUTROPHILS NFR BLD AUTO: 46.3 % (ref 42.7–76)
NRBC BLD AUTO-RTO: 0.3 /100 WBC (ref 0–0.2)
PLATELET # BLD AUTO: 239 10*3/MM3 (ref 140–450)
POTASSIUM SERPL-SCNC: 4.6 MMOL/L (ref 3.5–5.2)
PROT SERPL-MCNC: 7.5 G/DL (ref 6–8.5)
RBC # BLD AUTO: 4.4 10*6/MM3 (ref 4.14–5.8)
SODIUM SERPL-SCNC: 138 MMOL/L (ref 136–145)
TSH SERPL DL<=0.005 MIU/L-ACNC: 2.55 UIU/ML (ref 0.27–4.2)
VIT B12 SERPL-MCNC: 353 PG/ML (ref 211–946)
WBC # BLD AUTO: 7.77 10*3/MM3 (ref 3.4–10.8)

## 2020-04-21 RX ORDER — CHOLECALCIFEROL (VITAMIN D3) 50 MCG
2000 TABLET ORAL DAILY
Qty: 30 TABLET | Refills: 5 | Status: SHIPPED | OUTPATIENT
Start: 2020-04-21 | End: 2020-07-30 | Stop reason: SDUPTHER

## 2020-05-19 RX ORDER — OLOPATADINE HYDROCHLORIDE 665 UG/1
SPRAY NASAL
Qty: 30.5 G | Refills: 2 | Status: SHIPPED | OUTPATIENT
Start: 2020-05-19 | End: 2022-07-06

## 2020-05-19 RX ORDER — LEVOCETIRIZINE DIHYDROCHLORIDE 5 MG/1
TABLET, FILM COATED ORAL
Qty: 30 TABLET | Refills: 2 | Status: SHIPPED | OUTPATIENT
Start: 2020-05-19 | End: 2020-07-30 | Stop reason: SDUPTHER

## 2020-05-20 ENCOUNTER — TRANSCRIBE ORDERS (OUTPATIENT)
Dept: ADMINISTRATIVE | Facility: HOSPITAL | Age: 45
End: 2020-05-20

## 2020-05-20 DIAGNOSIS — J32.9 CHRONIC SINUSITIS, UNSPECIFIED LOCATION: Primary | ICD-10-CM

## 2020-05-26 ENCOUNTER — HOSPITAL ENCOUNTER (OUTPATIENT)
Dept: CT IMAGING | Facility: HOSPITAL | Age: 45
Discharge: HOME | End: 2020-05-26
Admitting: OTOLARYNGOLOGY

## 2020-05-26 DIAGNOSIS — J32.9 CHRONIC SINUSITIS, UNSPECIFIED LOCATION: ICD-10-CM

## 2020-05-26 PROCEDURE — 70486 CT MAXILLOFACIAL W/O DYE: CPT

## 2020-06-05 ENCOUNTER — OFFICE VISIT (OUTPATIENT)
Dept: INTERNAL MEDICINE | Facility: CLINIC | Age: 45
End: 2020-06-05

## 2020-06-05 ENCOUNTER — LAB (OUTPATIENT)
Dept: LAB | Facility: HOSPITAL | Age: 45
End: 2020-06-05

## 2020-06-05 VITALS
HEART RATE: 74 BPM | DIASTOLIC BLOOD PRESSURE: 64 MMHG | SYSTOLIC BLOOD PRESSURE: 98 MMHG | HEIGHT: 72 IN | WEIGHT: 174.5 LBS | BODY MASS INDEX: 23.63 KG/M2 | OXYGEN SATURATION: 97 % | TEMPERATURE: 98.2 F

## 2020-06-05 DIAGNOSIS — R53.83 FATIGUE, UNSPECIFIED TYPE: Primary | ICD-10-CM

## 2020-06-05 DIAGNOSIS — R50.9 FEVER, UNSPECIFIED FEVER CAUSE: ICD-10-CM

## 2020-06-05 DIAGNOSIS — J30.9 ALLERGIC RHINITIS, UNSPECIFIED SEASONALITY, UNSPECIFIED TRIGGER: ICD-10-CM

## 2020-06-05 DIAGNOSIS — R09.82 POSTNASAL DRIP: ICD-10-CM

## 2020-06-05 LAB — BACTERIA SPEC RESP CULT: NORMAL

## 2020-06-05 PROCEDURE — 86618 LYME DISEASE ANTIBODY: CPT | Performed by: FAMILY MEDICINE

## 2020-06-05 PROCEDURE — 99214 OFFICE O/P EST MOD 30 MIN: CPT | Performed by: FAMILY MEDICINE

## 2020-06-05 PROCEDURE — 86666 EHRLICHIA ANTIBODY: CPT | Performed by: FAMILY MEDICINE

## 2020-06-05 PROCEDURE — 36415 COLL VENOUS BLD VENIPUNCTURE: CPT | Performed by: FAMILY MEDICINE

## 2020-06-05 NOTE — PROGRESS NOTES
Fei Ojeda is a 44 y.o. male.    Chief Complaint   Patient presents with   • Allergies       HPI   Patient presents today complaining of persistent allergies.  He has seen ENT, who ordered a CAT scan of his sinuses.  Patient reports that sinuses were clear, and ENT did not offer any further treatment.  Patient is currently taking olopatadine, xyzal, Nasalide without any response.  He has tried several allergy medications in the past without any improvement.  He continues to complain of postnasal drip and nasal congestion.  He is concerned that he may have MRSA in his nose, but is never been tested.  He has seen his infectious disease doctor for HIV and is supposed to have laboratory studies done, but has not had a chance to get them done.  He had several labs ordered a couple of months ago by this office and was deficient in B12 and vitamin D.  He continues to complain of fatigue and body aches.  He does admit to cyclical fevers.  He is not currently febrile.    The following portions of the patient's history were reviewed and updated as appropriate: allergies, current medications, past family history, past medical history, past social history, past surgical history and problem list.     Allergies   Allergen Reactions   • Erythromycin GI Intolerance         Current Outpatient Medications:   •  BIKTARVY -25 MG per tablet, , Disp: , Rfl:   •  Cholecalciferol (VITAMIN D) 50 MCG (2000 UT) tablet, Take 2,000 Units by mouth Daily., Disp: 30 tablet, Rfl: 5  •  Cyanocobalamin (B-12) 2500 MCG sublingual tablet, Place 1 tablet under the tongue Daily., Disp: 30 tablet, Rfl: 5  •  flunisolide (NASALIDE) 25 MCG/ACT (0.025%) solution nasal spray, Inhale 2 sprays Every 12 (Twelve) Hours., Disp: 1 bottle, Rfl: 2  •  glucose blood test strip, E16.2 Use as instructed daily,, Disp: 100 each, Rfl: 12  •  glucose monitor monitoring kit, 1 each Daily. E16.2, Disp: 1 each, Rfl: 0  •  Lancets (ACCU-CHEK MULTICLIX) lancets, For  "daily testing, E16.2, Disp: 100 each, Rfl: 12  •  levocetirizine (XYZAL) 5 MG tablet, TAKE ONE TABLET BY MOUTH EVERY EVENING, Disp: 30 tablet, Rfl: 2  •  metroNIDAZOLE (METROGEL) 1 % gel, Apply topically to nose daily, Disp: 60 g, Rfl: 0  •  olopatadine (PATANASE) 0.6 % solution nasal solution, INSTILL TWO SPRAYS INTO EACH NOSTRIL TWO TIMES A DAY, Disp: 30.5 g, Rfl: 2  •  propranolol (INDERAL) 10 MG tablet, Take 1 tablet by mouth 3 (Three) Times a Day As Needed (anxiety, palpitations)., Disp: 90 tablet, Rfl: 0    ROS    Review of Systems   Constitutional: Positive for fatigue and fever. Negative for chills.   HENT: Positive for congestion and postnasal drip.    Respiratory: Negative for shortness of breath.    Cardiovascular: Negative for chest pain.   Gastrointestinal: Negative for abdominal pain, nausea and vomiting.   Musculoskeletal: Positive for arthralgias and myalgias.       Vitals:    06/05/20 1345   BP: 98/64   BP Location: Left arm   Patient Position: Sitting   Cuff Size: Adult   Pulse: 74   Temp: 98.2 °F (36.8 °C)   TempSrc: Temporal   SpO2: 97%   Weight: 79.2 kg (174 lb 8 oz)   Height: 182.9 cm (72\")     Body mass index is 23.67 kg/m².    Physical Exam     Physical Exam   Constitutional: He is oriented to person, place, and time. He appears well-developed and well-nourished. No distress.   HENT:   Head: Normocephalic and atraumatic.   Right Ear: Tympanic membrane and external ear normal.   Left Ear: Tympanic membrane and external ear normal.   Mouth/Throat: Posterior oropharyngeal erythema (With postnasal drip) present.   Eyes: Conjunctivae and EOM are normal.   Cardiovascular: Normal rate and regular rhythm.   No murmur heard.  Pulmonary/Chest: Effort normal and breath sounds normal. No respiratory distress. He has no wheezes.   Abdominal: Soft. Bowel sounds are normal. He exhibits no distension. There is no tenderness.   Musculoskeletal: He exhibits no edema.   Neurological: He is alert and oriented to " person, place, and time. No cranial nerve deficit.   Skin: Skin is warm and dry.   Psychiatric: He has a normal mood and affect. His behavior is normal.       Assessment/Plan    Problem List Items Addressed This Visit     None      Visit Diagnoses     Fatigue, unspecified type    -  Primary    Relevant Orders    Lyme, Total Antibody Test / Reflex    Marcin-Barr Virus VCA Antibody Panel    Ehrlichia Antibody Panel    Respiratory Culture - Sputum, Throat    MRSA Screen Culture (Outpatient) - Swab, Nares    Fever, unspecified fever cause        Relevant Orders    Lyme, Total Antibody Test / Reflex    Marcin-Barr Virus VCA Antibody Panel    Ehrlichia Antibody Panel    Respiratory Culture - Sputum, Throat    MRSA Screen Culture (Outpatient) - Swab, Nares    Postnasal drip        Relevant Orders    Lyme, Total Antibody Test / Reflex    Marcin-Barr Virus VCA Antibody Panel    Ehrlichia Antibody Panel    Respiratory Culture - Sputum, Throat    MRSA Screen Culture (Outpatient) - Swab, Nares    Allergic rhinitis, unspecified seasonality, unspecified trigger            Due to persistent symptoms, patient has been advised to follow-up with allergist.  He reports that he has been referred to an allergist by his infectious disease physician.  We will also obtain MRSA culture in addition to sputum culture.  In addition to allergic rhinitis, he continues to complain of fatigue despite being on B12 and vitamin D.  He also admits to cyclical fevers.  Will obtain additional laboratory studies for further evaluation.    No orders of the defined types were placed in this encounter.      No orders of the defined types were placed in this encounter.      No follow-ups on file.    Roseann Harris DO

## 2020-06-06 LAB — B BURGDOR IGG+IGM SER-ACNC: <0.91 ISR (ref 0–0.9)

## 2020-06-07 LAB — MRSA SPEC QL CULT: NEGATIVE

## 2020-06-09 LAB
A PHAGOCYTOPH IGM TITR SER IF: NEGATIVE {TITER}
CONV HGE IGG TITER: NEGATIVE
E CHAFFEENSIS IGG TITR SER IF: NEGATIVE {TITER}
E. CHAFFEENSIS (HME) IGM TITER: NEGATIVE

## 2020-06-16 RX ORDER — FLUNISOLIDE 0.25 MG/ML
2 SOLUTION NASAL EVERY 12 HOURS
Qty: 25 ML | Refills: 2 | Status: SHIPPED | OUTPATIENT
Start: 2020-06-16 | End: 2020-07-30 | Stop reason: SDUPTHER

## 2020-06-27 ENCOUNTER — TELEMEDICINE (OUTPATIENT)
Dept: FAMILY MEDICINE CLINIC | Facility: TELEHEALTH | Age: 45
End: 2020-06-27

## 2020-06-27 DIAGNOSIS — G56.92 NEUROPATHY OF LEFT UPPER EXTREMITY: ICD-10-CM

## 2020-06-27 DIAGNOSIS — J30.9 ALLERGIC RHINITIS, UNSPECIFIED SEASONALITY, UNSPECIFIED TRIGGER: Primary | ICD-10-CM

## 2020-06-27 PROCEDURE — 99213 OFFICE O/P EST LOW 20 MIN: CPT | Performed by: NURSE PRACTITIONER

## 2020-06-27 NOTE — PROGRESS NOTES
Subjective   Fei Ojeda is a 45 y.o. male who presents to the clinic with:        He reports allergies, rash, left arm/hand tingles and feel swollen and fatigue symptoms for long time.  He adds sees Infectious Disease for HIV, viral load undetected and CD4 count in 400's currently on triple med.  He saw ENT who stated CT sinuses were clear and now has been referred to Allergist.  Also saw two Derm.  He thinks could have HIV related neuropathy, or Fibromyalgia.    He has hard time getting off work to see provider in person.      Allergies   This is a chronic problem. The current episode started more than 1 year ago. The problem occurs constantly. The problem has been unchanged. Associated symptoms include congestion, fatigue, numbness and a rash. Pertinent negatives include no coughing, fever, sore throat, vomiting or weakness. Associated symptoms comments: Post nasal drip, left arm tingly, rash to nose. Nothing aggravates the symptoms. Treatments tried: multiple allergy meds.      Takes multivitamin and b12 SL daily.      The following portions of the patient's history were reviewed and updated as appropriate: allergies, current medications, past family history, past medical history, past social history, past surgical history and problem list.        Review of Systems   Constitutional: Positive for activity change and fatigue. Negative for fever.   HENT: Positive for congestion and postnasal drip. Negative for sore throat.    Respiratory: Negative for cough.    Gastrointestinal: Negative for vomiting.   Skin: Positive for rash.   Neurological: Positive for numbness. Negative for weakness.         Objective   Physical Exam   Constitutional: He is oriented to person, place, and time. He appears well-developed.   Thin appearance   Eyes: Pupils are equal, round, and reactive to light. Conjunctivae are normal.   Pulmonary/Chest: Effort normal.   Neurological: He is alert and oriented to person, place, and time.    Skin: Rash noted. Rash is macular.   Red macule to left tip of nose   Psychiatric: He has a normal mood and affect.       Assessment/Plan   Fei was seen today for allergies.    Diagnoses and all orders for this visit:    Allergic rhinitis, unspecified seasonality, unspecified trigger    Neuropathy of left upper extremity    He has taken singulair in past and did not work.  He does not appear acutely ill.    Recommend after long discussion to followup with PCP--make appt to discuss  Keep appt with Allergist

## 2020-07-24 ENCOUNTER — TELEPHONE (OUTPATIENT)
Dept: INTERNAL MEDICINE | Facility: CLINIC | Age: 45
End: 2020-07-24

## 2020-07-24 NOTE — TELEPHONE ENCOUNTER
[facility] WITH  INFECTIOUS DISEASE IS  REQUESTING THE LATEST APPT NOTES AND IMMUNIZATION RECORDS FAXED TO DR MOISE NICE AT    FAX NUMBER [phone]    ANY QUESTIONS OR CONCERNS, PLEASE CALL [facility] [phone]

## 2020-07-28 ENCOUNTER — LAB (OUTPATIENT)
Dept: LAB | Facility: HOSPITAL | Age: 45
End: 2020-07-28

## 2020-07-28 PROCEDURE — 86665 EPSTEIN-BARR CAPSID VCA: CPT | Performed by: FAMILY MEDICINE

## 2020-07-28 PROCEDURE — 36415 COLL VENOUS BLD VENIPUNCTURE: CPT | Performed by: FAMILY MEDICINE

## 2020-07-28 PROCEDURE — 86664 EPSTEIN-BARR NUCLEAR ANTIGEN: CPT | Performed by: FAMILY MEDICINE

## 2020-07-30 LAB
EBV NA IGG SER IA-ACNC: >600 U/ML (ref 0–17.9)
EBV VCA IGG SER-ACNC: 184 U/ML (ref 0–17.9)
EBV VCA IGM SER-ACNC: <36 U/ML (ref 0–35.9)
INTERPRETATION: ABNORMAL

## 2020-07-31 RX ORDER — FLUNISOLIDE 0.25 MG/ML
2 SOLUTION NASAL EVERY 12 HOURS
Qty: 25 ML | Refills: 2 | Status: SHIPPED | OUTPATIENT
Start: 2020-07-31 | End: 2022-07-06

## 2020-07-31 RX ORDER — LEVOCETIRIZINE DIHYDROCHLORIDE 5 MG/1
5 TABLET, FILM COATED ORAL EVERY EVENING
Qty: 30 TABLET | Refills: 2 | Status: SHIPPED | OUTPATIENT
Start: 2020-07-31 | End: 2022-07-06

## 2020-08-01 RX ORDER — CHOLECALCIFEROL (VITAMIN D3) 50 MCG
2000 TABLET ORAL DAILY
Qty: 30 TABLET | Refills: 5 | Status: SHIPPED | OUTPATIENT
Start: 2020-08-01 | End: 2021-08-01

## 2021-01-14 ENCOUNTER — IMMUNIZATION (OUTPATIENT)
Dept: VACCINE CLINIC | Facility: HOSPITAL | Age: 46
End: 2021-01-14

## 2021-01-14 PROCEDURE — 0011A: CPT | Performed by: INTERNAL MEDICINE

## 2021-01-14 PROCEDURE — 91301 HC SARSCO02 VAC 100MCG/0.5ML IM: CPT | Performed by: INTERNAL MEDICINE

## 2021-01-15 ENCOUNTER — PATIENT MESSAGE (OUTPATIENT)
Dept: INTERNAL MEDICINE | Facility: CLINIC | Age: 46
End: 2021-01-15

## 2021-01-15 DIAGNOSIS — Z11.1 ENCOUNTER FOR PPD TEST: Primary | ICD-10-CM

## 2021-01-15 NOTE — TELEPHONE ENCOUNTER
From: Fei Ojeda  To: Roseann Harris DO  Sent: 1/15/2021 1:32 AM EST  Subject: Non-Urgent Medical Question    Dr. Harris:    I need to have my TB test completed  For the year. Can you order this for more me?

## 2021-01-18 ENCOUNTER — CLINICAL SUPPORT (OUTPATIENT)
Dept: INTERNAL MEDICINE | Facility: CLINIC | Age: 46
End: 2021-01-18

## 2021-01-18 PROCEDURE — 86580 TB INTRADERMAL TEST: CPT | Performed by: FAMILY MEDICINE

## 2021-01-21 ENCOUNTER — CLINICAL SUPPORT (OUTPATIENT)
Dept: INTERNAL MEDICINE | Facility: CLINIC | Age: 46
End: 2021-01-21

## 2021-01-21 LAB
INDURATION: 0 MM (ref 0–10)
Lab: NORMAL
Lab: NORMAL
TB SKIN TEST: NEGATIVE

## 2021-02-09 ENCOUNTER — IMMUNIZATION (OUTPATIENT)
Dept: VACCINE CLINIC | Facility: HOSPITAL | Age: 46
End: 2021-02-09

## 2021-02-09 PROCEDURE — 91301 HC SARSCO02 VAC 100MCG/0.5ML IM: CPT | Performed by: INTERNAL MEDICINE

## 2021-02-09 PROCEDURE — 0012A: CPT | Performed by: INTERNAL MEDICINE

## 2021-02-23 ENCOUNTER — TRANSCRIBE ORDERS (OUTPATIENT)
Dept: ADMINISTRATIVE | Facility: HOSPITAL | Age: 46
End: 2021-02-23

## 2021-02-23 DIAGNOSIS — N50.819 PAIN IN TESTICLE, UNSPECIFIED LATERALITY: Primary | ICD-10-CM

## 2021-02-23 DIAGNOSIS — N50.0 ATROPHY OF BOTH TESTES: ICD-10-CM

## 2021-02-24 ENCOUNTER — HOSPITAL ENCOUNTER (OUTPATIENT)
Dept: ULTRASOUND IMAGING | Facility: HOSPITAL | Age: 46
Discharge: HOME | End: 2021-02-24
Admitting: INTERNAL MEDICINE

## 2021-02-24 DIAGNOSIS — N50.819 PAIN IN TESTICLE, UNSPECIFIED LATERALITY: ICD-10-CM

## 2021-02-24 DIAGNOSIS — N50.0 ATROPHY OF BOTH TESTES: ICD-10-CM

## 2021-02-24 PROCEDURE — 76870 US EXAM SCROTUM: CPT

## 2021-03-04 ENCOUNTER — OFFICE VISIT (OUTPATIENT)
Dept: INTERNAL MEDICINE | Facility: CLINIC | Age: 46
End: 2021-03-04

## 2021-03-04 VITALS
HEIGHT: 72 IN | BODY MASS INDEX: 22.75 KG/M2 | OXYGEN SATURATION: 98 % | SYSTOLIC BLOOD PRESSURE: 108 MMHG | WEIGHT: 168 LBS | HEART RATE: 80 BPM | DIASTOLIC BLOOD PRESSURE: 64 MMHG | TEMPERATURE: 98.3 F

## 2021-03-04 DIAGNOSIS — B20 CURRENTLY ASYMPTOMATIC HIV INFECTION, WITH HISTORY OF HIV-RELATED ILLNESS (HCC): Primary | ICD-10-CM

## 2021-03-04 DIAGNOSIS — D51.0 PERNICIOUS ANEMIA: ICD-10-CM

## 2021-03-04 DIAGNOSIS — M79.10 MUSCLE PAIN: ICD-10-CM

## 2021-03-04 DIAGNOSIS — R53.82 CHRONIC FATIGUE: ICD-10-CM

## 2021-03-04 PROCEDURE — 99213 OFFICE O/P EST LOW 20 MIN: CPT | Performed by: NURSE PRACTITIONER

## 2021-03-04 RX ORDER — DULOXETIN HYDROCHLORIDE 30 MG/1
30 CAPSULE, DELAYED RELEASE ORAL DAILY
COMMUNITY
End: 2022-07-06

## 2021-03-04 NOTE — PROGRESS NOTES
"  Office Visit      Patient Name: Fei Ojeda  : 1975   MRN: [id number]   Care Team: Patient Care Team:  Roseann Harris DO as PCP - General (Family Medicine)    Chief Complaint  Fatigue (Has been having fatigue for the past year. Even with adequate sleep he will still have fatigue and it's now starting to affect his dailly life and activities.)    Subjective     Subjective      Fei Ojeda presents to [facility] for fatigue and muscle pain. Symptoms have been present for over a year. Has been worked up in the past for sleep apnea and multiple lab test which showed no underlying causes for his fatigue. Last year EBV, lyme, and RMSF titers drawn and only showed past infection with EBV. Does have a history of pernicious anemia and vitamin d deficiency. Currently not on any treatment, says he did not notice a difference in his fatigue when taking.  Nothing makes his symptoms worse or better.  Sees  for HIV monitoring and management.  Just started taking cymbalta prescribed from HIV clinic at . Hasn't noticed much of a difference yet but only started 1 week ago.     Objective     Objective   Vital Signs:   /64   Pulse 80   Temp 98.3 °F (36.8 °C)   Ht 182.9 cm (72\")   Wt 76.2 kg (168 lb)   SpO2 98%   BMI 22.78 kg/m²     Physical Exam  Vitals signs and nursing note reviewed.   Constitutional:       General: He is not in acute distress.     Appearance: Normal appearance.   Neck:      Musculoskeletal: Neck supple. No muscular tenderness.      Vascular: No carotid bruit.   Cardiovascular:      Rate and Rhythm: Normal rate and regular rhythm.      Heart sounds: Normal heart sounds. No murmur.   Pulmonary:      Effort: Pulmonary effort is normal.      Breath sounds: Normal breath sounds. No wheezing.   Abdominal:      General: Bowel sounds are normal. There is no distension.      Palpations: Abdomen is soft.      Tenderness: There is no abdominal tenderness. "   Musculoskeletal: Normal range of motion.         General: No tenderness.      Comments: No point tenderness on exam   Skin:     General: Skin is warm and dry.      Findings: No rash.   Neurological:      General: No focal deficit present.      Mental Status: He is alert.      Deep Tendon Reflexes: Reflexes normal.   Psychiatric:         Mood and Affect: Mood normal.         Behavior: Behavior normal.          Assessment / Plan         Assessment  Problem List Items Addressed This Visit     None      Visit Diagnoses     Currently asymptomatic HIV infection, with history of HIV-related illness (CMS/HCC)    -  Primary    Relevant Orders    Comprehensive metabolic panel    Muscle pain        Relevant Orders    KWAN    Rheumatoid Factor    Ambulatory Referral to Rheumatology    Vitamin D 25 hydroxy    Vitamin B12    Chronic fatigue        Relevant Orders    KWAN    Rheumatoid Factor    Ambulatory Referral to Rheumatology    Vitamin D 25 hydroxy    Vitamin B12    TSH    T4, free    Pernicious anemia        Relevant Orders    CBC No Differential          Plan  -Labs as above to rule out underlying causes for fatigue.  Could be related to his HIV.  Discussed this with him in depth.  -The symptoms have been ongoing I do think he would benefit from rheumatology referral, this was made today.  -I encouraged him to keep taking Cymbalta as this could help his symptoms.  We discussed that it could take up to 6 weeks to notice any difference and there is room to increase his dose.  He is agreeable to the plan and will follow up with the psychiatric nurse practitioner at .  -Return to clinic if symptoms worsen or fail to improve.    Follow Up   Return if symptoms worsen or fail to improve.  Patient was given instructions and counseling regarding his condition or for health maintenance advice. Please see specific information pulled into the AVS if appropriate.     ASHWINI Mendieta  [facility] -  [place]

## 2021-03-05 LAB
25(OH)D3+25(OH)D2 SERPL-MCNC: 31 NG/ML (ref 30–100)
ALBUMIN SERPL-MCNC: 4.4 G/DL (ref 3.5–5.2)
ALBUMIN/GLOB SERPL: 1.8 G/DL
ALP SERPL-CCNC: 50 U/L (ref 39–117)
ALT SERPL-CCNC: 10 U/L (ref 1–41)
ANA SER QL: NEGATIVE
AST SERPL-CCNC: 19 U/L (ref 1–40)
BILIRUB SERPL-MCNC: 0.4 MG/DL (ref 0–1.2)
BUN SERPL-MCNC: 17 MG/DL (ref 6–20)
BUN/CREAT SERPL: 14.8 (ref 7–25)
CALCIUM SERPL-MCNC: 9.5 MG/DL (ref 8.6–10.5)
CHLORIDE SERPL-SCNC: 101 MMOL/L (ref 98–107)
CO2 SERPL-SCNC: 29.6 MMOL/L (ref 22–29)
CREAT SERPL-MCNC: 1.15 MG/DL (ref 0.76–1.27)
ERYTHROCYTE [DISTWIDTH] IN BLOOD BY AUTOMATED COUNT: 13 % (ref 12.3–15.4)
GLOBULIN SER CALC-MCNC: 2.5 GM/DL
GLUCOSE SERPL-MCNC: 53 MG/DL (ref 65–99)
HCT VFR BLD AUTO: 40.1 % (ref 37.5–51)
HGB BLD-MCNC: 13.4 G/DL (ref 13–17.7)
MCH RBC QN AUTO: 31.1 PG (ref 26.6–33)
MCHC RBC AUTO-ENTMCNC: 33.4 G/DL (ref 31.5–35.7)
MCV RBC AUTO: 93 FL (ref 79–97)
PLATELET # BLD AUTO: 290 10*3/MM3 (ref 140–450)
POTASSIUM SERPL-SCNC: 4.6 MMOL/L (ref 3.5–5.2)
PROT SERPL-MCNC: 6.9 G/DL (ref 6–8.5)
RBC # BLD AUTO: 4.31 10*6/MM3 (ref 4.14–5.8)
RHEUMATOID FACT SERPL-ACNC: <10 IU/ML (ref 0–13.9)
SODIUM SERPL-SCNC: 139 MMOL/L (ref 136–145)
T4 FREE SERPL-MCNC: 1.22 NG/DL (ref 0.93–1.7)
TSH SERPL DL<=0.005 MIU/L-ACNC: 2.69 UIU/ML (ref 0.27–4.2)
VIT B12 SERPL-MCNC: 367 PG/ML (ref 211–946)
WBC # BLD AUTO: 6.37 10*3/MM3 (ref 3.4–10.8)

## 2021-07-20 ENCOUNTER — E-VISIT (OUTPATIENT)
Dept: FAMILY MEDICINE CLINIC | Facility: TELEHEALTH | Age: 46
End: 2021-07-20

## 2021-07-20 DIAGNOSIS — H92.09 OTALGIA, UNSPECIFIED LATERALITY: ICD-10-CM

## 2021-07-20 DIAGNOSIS — J01.90 ACUTE NON-RECURRENT SINUSITIS, UNSPECIFIED LOCATION: Primary | ICD-10-CM

## 2021-07-20 PROCEDURE — 99422 OL DIG E/M SVC 11-20 MIN: CPT | Performed by: NURSE PRACTITIONER

## 2021-07-20 RX ORDER — AMOXICILLIN 875 MG/1
875 TABLET, COATED ORAL 2 TIMES DAILY
Qty: 20 TABLET | Refills: 0 | Status: SHIPPED | OUTPATIENT
Start: 2021-07-20 | End: 2021-07-30

## 2021-07-20 NOTE — PROGRESS NOTES
I have reviewed the e-Visit questionnaire and patient's answers, and my assessment and plan are as follows:    HPI  Fei Ojeda is a 46 y.o. male  presents with complaint of 1-2 week history of congestion, sinus pain, headache, earache and neck pain. Temp less than 100.4 for 1-4 days. Former smoker. Has been taking nasal spray, allergy med and ear drops.    Review of Systems - Negative except those listed in the HPI.      Diagnoses and all orders for this visit:    1. Acute non-recurrent sinusitis, unspecified location (Primary)  -     amoxicillin (AMOXIL) 875 MG tablet; Take 1 tablet by mouth 2 (Two) Times a Day for 10 days.  Dispense: 20 tablet; Refill: 0    2. Otalgia, unspecified laterality  -     amoxicillin (AMOXIL) 875 MG tablet; Take 1 tablet by mouth 2 (Two) Times a Day for 10 days.  Dispense: 20 tablet; Refill: 0          FOLLOW-UP  If symptoms worsen or fail to improve, follow-up with PCP/Urgent Care/Emergency Department.      Time Documentation  Counseled patient  Counseling topics: diagnosis, treatment options and follow up plan  Total encounter time: counseling time more than 50% of visit: 15 minutes        Linnette Quintero, ASHWINI  07/20/21  12:08 EDT

## 2021-07-20 NOTE — PATIENT INSTRUCTIONS
Sinusitis, Adult  Sinusitis is inflammation of your sinuses. Sinuses are hollow spaces in the bones around your face. Your sinuses are located:  · Around your eyes.  · In the middle of your forehead.  · Behind your nose.  · In your cheekbones.  Mucus normally drains out of your sinuses. When your nasal tissues become inflamed or swollen, mucus can become trapped or blocked. This allows bacteria, viruses, and fungi to grow, which leads to infection. Most infections of the sinuses are caused by a virus.  Sinusitis can develop quickly. It can last for up to 4 weeks (acute) or for more than 12 weeks (chronic). Sinusitis often develops after a cold.  What are the causes?  This condition is caused by anything that creates swelling in the sinuses or stops mucus from draining. This includes:  · Allergies.  · Asthma.  · Infection from bacteria or viruses.  · Deformities or blockages in your nose or sinuses.  · Abnormal growths in the nose (nasal polyps).  · Pollutants, such as chemicals or irritants in the air.  · Infection from fungi (rare).  What increases the risk?  You are more likely to develop this condition if you:  · Have a weak body defense system (immune system).  · Do a lot of swimming or diving.  · Overuse nasal sprays.  · Smoke.  What are the signs or symptoms?  The main symptoms of this condition are pain and a feeling of pressure around the affected sinuses. Other symptoms include:  · Stuffy nose or congestion.  · Thick drainage from your nose.  · Swelling and warmth over the affected sinuses.  · Headache.  · Upper toothache.  · A cough that may get worse at night.  · Extra mucus that collects in the throat or the back of the nose (postnasal drip).  · Decreased sense of smell and taste.  · Fatigue.  · A fever.  · Sore throat.  · Bad breath.  How is this diagnosed?  This condition is diagnosed based on:  · Your symptoms.  · Your medical history.  · A physical exam.  · Tests to find out if your condition is  acute or chronic. This may include:  ? Checking your nose for nasal polyps.  ? Viewing your sinuses using a device that has a light (endoscope).  ? Testing for allergies or bacteria.  ? Imaging tests, such as an MRI or CT scan.  In rare cases, a bone biopsy may be done to rule out more serious types of fungal sinus disease.  How is this treated?  Treatment for sinusitis depends on the cause and whether your condition is chronic or acute.  · If caused by a virus, your symptoms should go away on their own within 10 days. You may be given medicines to relieve symptoms. They include:  ? Medicines that shrink swollen nasal passages (topical intranasal decongestants).  ? Medicines that treat allergies (antihistamines).  ? A spray that eases inflammation of the nostrils (topical intranasal corticosteroids).  ? Rinses that help get rid of thick mucus in your nose (nasal saline washes).  · If caused by bacteria, your health care provider may recommend waiting to see if your symptoms improve. Most bacterial infections will get better without antibiotic medicine. You may be given antibiotics if you have:  ? A severe infection.  ? A weak immune system.  · If caused by narrow nasal passages or nasal polyps, you may need to have surgery.  Follow these instructions at home:  Medicines  · Take, use, or apply over-the-counter and prescription medicines only as told by your health care provider. These may include nasal sprays.  · If you were prescribed an antibiotic medicine, take it as told by your health care provider. Do not stop taking the antibiotic even if you start to feel better.  Hydrate and humidify    · Drink enough fluid to keep your urine pale yellow. Staying hydrated will help to thin your mucus.  · Use a cool mist humidifier to keep the humidity level in your home above 50%.  · Inhale steam for 10-15 minutes, 3-4 times a day, or as told by your health care provider. You can do this in the bathroom while a hot shower is  running.  · Limit your exposure to cool or dry air.  Rest  · Rest as much as possible.  · Sleep with your head raised (elevated).  · Make sure you get enough sleep each night.  General instructions    · Apply a warm, moist washcloth to your face 3-4 times a day or as told by your health care provider. This will help with discomfort.  · Wash your hands often with soap and water to reduce your exposure to germs. If soap and water are not available, use hand .  · Do not smoke. Avoid being around people who are smoking (secondhand smoke).  · Keep all follow-up visits as told by your health care provider. This is important.  Contact a health care provider if:  · You have a fever.  · Your symptoms get worse.  · Your symptoms do not improve within 10 days.  Get help right away if:  · You have a severe headache.  · You have persistent vomiting.  · You have severe pain or swelling around your face or eyes.  · You have vision problems.  · You develop confusion.  · Your neck is stiff.  · You have trouble breathing.  Summary  · Sinusitis is soreness and inflammation of your sinuses. Sinuses are hollow spaces in the bones around your face.  · This condition is caused by nasal tissues that become inflamed or swollen. The swelling traps or blocks the flow of mucus. This allows bacteria, viruses, and fungi to grow, which leads to infection.  · If you were prescribed an antibiotic medicine, take it as told by your health care provider. Do not stop taking the antibiotic even if you start to feel better.  · Keep all follow-up visits as told by your health care provider. This is important.  This information is not intended to replace advice given to you by your health care provider. Make sure you discuss any questions you have with your health care provider.  Document Revised: 05/20/2019 Document Reviewed: 05/20/2019  [employer] Patient Education © 2021 [employer]

## 2022-04-16 ENCOUNTER — OFFICE VISIT (OUTPATIENT)
Dept: INTERNAL MEDICINE | Facility: CLINIC | Age: 47
End: 2022-04-16

## 2022-04-16 VITALS
OXYGEN SATURATION: 95 % | HEIGHT: 72 IN | SYSTOLIC BLOOD PRESSURE: 115 MMHG | BODY MASS INDEX: 23.7 KG/M2 | RESPIRATION RATE: 16 BRPM | HEART RATE: 75 BPM | DIASTOLIC BLOOD PRESSURE: 70 MMHG | WEIGHT: 175 LBS | TEMPERATURE: 98.2 F

## 2022-04-16 DIAGNOSIS — J32.4 CHRONIC PANSINUSITIS: Primary | ICD-10-CM

## 2022-04-16 PROCEDURE — 99213 OFFICE O/P EST LOW 20 MIN: CPT | Performed by: NURSE PRACTITIONER

## 2022-04-16 RX ORDER — CEFDINIR 300 MG/1
300 CAPSULE ORAL 2 TIMES DAILY
Qty: 14 CAPSULE | Refills: 0 | Status: SHIPPED | OUTPATIENT
Start: 2022-04-16 | End: 2022-04-23

## 2022-04-16 NOTE — PROGRESS NOTES
Answers for HPI/ROS submitted by the patient on 2022  What is the primary reason for your visit?: Ear Pain  Affected ear: both  Chronicity: chronic  Onset: more than 1 year ago  Progression since onset: rapidly worsening  Frequency: constantly  Fever: no fever  Fever duration: 1 to 2 days  Pain - numeric: 10/10  abdominal pain: Yes  ear discharge: No  rash: Yes  cough: Yes  headaches: Yes  rhinorrhea: Yes  diarrhea: No  hearing loss: Yes  sore throat: Yes  neck pain: Yes  vomiting: No      Office Visit      Patient Name: Fei Ojeda  : 1975   MRN: [id number]   Care Team: Patient Care Team:  Lauren Petersen APRN as PCP - General (Family Medicine)    Chief Complaint  Earache (bilateral), sinus drainage, and Headache    Subjective     Subjective      Fei Ojeda presents to [facility] for sinus problem.   Symptoms have been ongoing for about 1 month. Has been on 2 rounds of antibiotics without much relief, currently on augmentin. He has been treated by [facility] for this problem.   Endorses bilateral otalgia, sinus drainage, headache, body aches, sore throat, non-productive cough, and fatigue.    Denies shortness of breath, chest pain, fever, chills, night sweats.    Takes cetrizine and sudafed daily.   He does suffer from HIV and is managed by infectious disease through . He has been evaluated recently through their clinic and referred to allergy for these problems. He saw ENT in the past but did not find it helpful.     Review of Systems   HENT: Positive for ear pain, hearing loss, rhinorrhea and sore throat. Negative for ear discharge.    Respiratory: Positive for cough.    Gastrointestinal: Positive for abdominal pain. Negative for diarrhea and vomiting.   Musculoskeletal: Positive for neck pain.   Skin: Positive for rash (has HIV and gets rashes often).       Objective     Objective   Vital Signs:   /70   Pulse 75   Temp 98.2 °F (36.8 °C)   Resp 16   Ht  "182.9 cm (72.01\")   Wt 79.4 kg (175 lb)   SpO2 95%   BMI 23.73 kg/m²     Physical Exam  Vitals and nursing note reviewed.   Constitutional:       General: He is not in acute distress.     Appearance: Normal appearance. He is not ill-appearing or toxic-appearing.   HENT:      Head:      Salivary Glands: Right salivary gland is not diffusely enlarged.      Right Ear: Ear canal normal. A middle ear effusion is present. Tympanic membrane is not bulging.      Left Ear: Ear canal normal. A middle ear effusion is present. Tympanic membrane is not bulging.      Nose: Congestion present. No rhinorrhea.      Right Sinus: Maxillary sinus tenderness and frontal sinus tenderness present.      Left Sinus: Maxillary sinus tenderness and frontal sinus tenderness present.      Mouth/Throat:      Mouth: Mucous membranes are moist.      Pharynx: Posterior oropharyngeal erythema (mild PND) present.   Eyes:      Pupils: Pupils are equal, round, and reactive to light.   Cardiovascular:      Rate and Rhythm: Normal rate and regular rhythm.      Heart sounds: Normal heart sounds. No murmur heard.  Pulmonary:      Effort: Pulmonary effort is normal. No respiratory distress.      Breath sounds: Normal breath sounds. No wheezing.   Abdominal:      General: Bowel sounds are normal. There is no distension.      Palpations: Abdomen is soft.      Tenderness: There is no abdominal tenderness.   Musculoskeletal:      Cervical back: Neck supple. No tenderness.   Lymphadenopathy:      Head:      Right side of head: Submandibular and tonsillar adenopathy present. No submental adenopathy.      Left side of head: Submandibular and tonsillar adenopathy present. No submental adenopathy.      Cervical: No cervical adenopathy.   Skin:     General: Skin is warm and dry.      Findings: No rash.   Neurological:      Mental Status: He is alert.   Psychiatric:         Mood and Affect: Mood normal.         Behavior: Behavior normal.          Assessment / Plan  "     Assessment/Plan   Problem List Items Addressed This Visit    None     Visit Diagnoses     Chronic pansinusitis    -  Primary    Relevant Medications    cefdinir (OMNICEF) 300 MG capsule    Change to cefdinir for broader coverage, stop augmentin. Recommend steroid nasal spray, ibuprofen as needed, push fluids, rest, and sudafed sparingly. Keep appointment with allergy and consider going back to ENT for chronicity of this problem, he politely declines at this time.            Follow Up   Return if symptoms worsen or fail to improve.  Patient was given instructions and counseling regarding his condition or for health maintenance advice. Please see specific information pulled into the AVS if appropriate.     ASHWINI Mendieta  [facility] [place]

## 2022-07-06 ENCOUNTER — TELEMEDICINE (OUTPATIENT)
Dept: INTERNAL MEDICINE | Facility: CLINIC | Age: 47
End: 2022-07-06

## 2022-07-06 ENCOUNTER — TELEPHONE (OUTPATIENT)
Dept: INTERNAL MEDICINE | Facility: CLINIC | Age: 47
End: 2022-07-06

## 2022-07-06 DIAGNOSIS — H65.06 RECURRENT ACUTE SEROUS OTITIS MEDIA OF BOTH EARS: ICD-10-CM

## 2022-07-06 DIAGNOSIS — B20 CURRENTLY ASYMPTOMATIC HIV INFECTION, WITH HISTORY OF HIV-RELATED ILLNESS: ICD-10-CM

## 2022-07-06 DIAGNOSIS — R91.1 PULMONARY NODULE LESS THAN 6 MM IN DIAMETER WITH LOW RISK FOR MALIGNANT NEOPLASM: ICD-10-CM

## 2022-07-06 DIAGNOSIS — M85.89 OSTEOPENIA OF MULTIPLE SITES: Primary | ICD-10-CM

## 2022-07-06 DIAGNOSIS — Z91.89 PULMONARY NODULE LESS THAN 6 MM IN DIAMETER WITH LOW RISK FOR MALIGNANT NEOPLASM: ICD-10-CM

## 2022-07-06 PROCEDURE — 99214 OFFICE O/P EST MOD 30 MIN: CPT | Performed by: NURSE PRACTITIONER

## 2022-07-06 RX ORDER — PREDNISONE 20 MG/1
40 TABLET ORAL DAILY
Qty: 10 TABLET | Refills: 0 | Status: SHIPPED | OUTPATIENT
Start: 2022-07-06 | End: 2022-07-11

## 2022-07-06 NOTE — PROGRESS NOTES
You have chosen to receive care through a telehealth visit.  Do you consent to use a video/audio connection for your medical care today? Yes    Present during video  Fei Ojeda, Patient  ASHWINI Mendieta, [facility]

## 2022-07-06 NOTE — TELEPHONE ENCOUNTER
Patient states he was returning your phone call.  He is on his 15 min break, but asked if you could call back after 12:30.  Thank you.

## 2022-07-06 NOTE — PROGRESS NOTES
"  Office Visit      Patient Name: Fei Ojeda  : 1975   MRN: [id number]   Care Team: Patient Care Team:  Lauren Petersen APRN as PCP - General (Family Medicine)    Chief Complaint  Lung Nodule (That has not been followed up on. He is scheduled to see Dr. Osei in October), Back Pain (Last Dexa scan showed osteopenia), and Joint Pain    Subjective     Subjective      Fei Ojeda presents to [facility] for the above concerns and otalgia.   Lung nodule- incidental finding on CT scan 2 years ago, he was supposed to have follow-up scan last year but did not get done due to COVID concerns. He denies any hemoptysis, dyspnea, orthopnea, or chronic cough. Is followed by Dr. Osei and recently made an appointment for October. He would like to have imaging prior to his appointment. Was thought to be scar tissue from when he was a child. He is HIV positive and is managed by infectious disease.  Has concerns regarding osteopenia today, states he had a DEXA scan around 5 years ago that showed osteopenia of both the lumbar spine and femur. He has not had a repeat scan and is currently not on any treatment. No recent vitamin d level. Does suffer from chronic joint and back pain. Taking biktarvy per infectious disease.  Suffers from chronic pansinusitis, seen by ENT who only recommended he stop chewing gum. For at least 2 weeks he has had very bothersome otalgia bilaterally, feels like he can't hear well and that he is talking through a tunnel. Last year had the same problem and was told it was \"fluid\" behind his ear and suffered for 34 days with this problem. He has tried multiple nasal sprays and none have been helpful. He is also taking daily cetrizine, helping minimally. Otalgia is a 9/10 at times. He does think prednisone has helped in the past. Denies fever, chills, cough, and sinus pain. Did recently have COVID-19, took paxlovid and helped a lot. No lingering symptoms that he has " noticed.      Objective     Objective   Vital Signs:   There were no vitals taken for this visit.    Physical Exam   Constitutional: He appears well-developed and well-nourished. No distress.   Unable to obtain vital signs   HENT:   Head: Normocephalic.   Right Ear: No drainage or tenderness. Decreased hearing (patient reports) is noted.   Left Ear: No drainage or tenderness. Decreased hearing (patient reports) is noted.   Nose: Congestion present. Right sinus exhibits no maxillary sinus tenderness and no frontal sinus tenderness. Left sinus exhibits no maxillary sinus tenderness and no frontal sinus tenderness.   Eyes: Pupils are equal, round, and reactive to light.   Pulmonary/Chest: Effort normal.  No respiratory distress. He no audible wheeze (none audible)...  Abdominal: There is no abdominal tenderness.   Musculoskeletal: Normal range of motion.   Neurological: He is alert.   Skin: No rash noted.   Psychiatric: He has a normal mood and affect.        Assessment / Plan         Assessment/Plan  Problem List Items Addressed This Visit    None     Visit Diagnoses     Osteopenia of multiple sites    -  Primary    Relevant Orders    CBC No Differential    Vitamin D 25 hydroxy    Calcium    DEXA Bone Density Axial    Repeat DEXA today and labs as above. Recommend light weight bearing exercise, extra strength tylenol as needed for pain sparingly, daily sun exposure of at least 15 minutes on the arms and face, and a diet high in calcium rich foods.    Recurrent acute serous otitis media of both ears        Relevant Orders    CBC No Differential    Vitamin D 25 hydroxy    Calcium    Do not suspect infectious etiology due to presentation. Has failed multiple nasal sprays and does not want to go back on. Recommend OTC sinus rinses and since oral steroids helped in the past will do 5 day burst due to pain severity- has been cautioned on SE. Recommend following up with ENT and allergist which he is established with.     Pulmonary nodule less than 6 mm in diameter with low risk for malignant neoplasm        Relevant Orders    CBC No Differential    Vitamin D 25 hydroxy    Calcium    CT Chest With & Without Contrast    Repeat CT to check stability of nodule, no red flags on exam. Keep follow-up with Dr. Osei in October.     Currently asymptomatic HIV infection, with history of HIV-related illness (HCC)        Managed by infectious disease and doing well, keep follow-up and continue current medication regimen.         Patient was given instructions and counseling regarding his condition or for health maintenance advice. Please see specific information pulled into the AVS if appropriate.     You have chosen to receive care through a telehealth visit.  Do you consent to use a video/audio connection for your medical care today? Yes     ASHWINI Mendieta  [facility] [place]

## 2022-07-20 ENCOUNTER — HOSPITAL ENCOUNTER (EMERGENCY)
Facility: HOSPITAL | Age: 47
Discharge: HOME | End: 2022-07-20
Attending: EMERGENCY MEDICINE | Admitting: EMERGENCY MEDICINE

## 2022-07-20 ENCOUNTER — APPOINTMENT (OUTPATIENT)
Dept: BONE DENSITY | Facility: HOSPITAL | Age: 47
End: 2022-07-20

## 2022-07-20 ENCOUNTER — APPOINTMENT (OUTPATIENT)
Dept: CT IMAGING | Facility: HOSPITAL | Age: 47
End: 2022-07-20

## 2022-07-20 VITALS
SYSTOLIC BLOOD PRESSURE: 103 MMHG | TEMPERATURE: 98 F | WEIGHT: 185 LBS | OXYGEN SATURATION: 99 % | HEART RATE: 73 BPM | HEIGHT: 72 IN | BODY MASS INDEX: 25.06 KG/M2 | DIASTOLIC BLOOD PRESSURE: 81 MMHG | RESPIRATION RATE: 18 BRPM

## 2022-07-20 DIAGNOSIS — K62.89 PROCTITIS: ICD-10-CM

## 2022-07-20 DIAGNOSIS — R10.9 ABDOMINAL PAIN, UNSPECIFIED ABDOMINAL LOCATION: Primary | ICD-10-CM

## 2022-07-20 LAB
ALBUMIN SERPL-MCNC: 5 G/DL (ref 3.5–5.2)
ALBUMIN/GLOB SERPL: 2.2 G/DL
ALP SERPL-CCNC: 63 U/L (ref 39–117)
ALT SERPL W P-5'-P-CCNC: 15 U/L (ref 1–41)
ANION GAP SERPL CALCULATED.3IONS-SCNC: 15.3 MMOL/L (ref 5–15)
AST SERPL-CCNC: 21 U/L (ref 1–40)
BASOPHILS # BLD AUTO: 0.06 10*3/MM3 (ref 0–0.2)
BASOPHILS NFR BLD AUTO: 1.1 % (ref 0–1.5)
BILIRUB SERPL-MCNC: 0.2 MG/DL (ref 0–1.2)
BILIRUB UR QL STRIP: NEGATIVE
BUN SERPL-MCNC: 18 MG/DL (ref 6–20)
BUN/CREAT SERPL: 15 (ref 7–25)
CALCIUM SPEC-SCNC: 9.2 MG/DL (ref 8.6–10.5)
CHLORIDE SERPL-SCNC: 101 MMOL/L (ref 98–107)
CLARITY UR: CLEAR
CO2 SERPL-SCNC: 24.7 MMOL/L (ref 22–29)
COLOR UR: YELLOW
CREAT SERPL-MCNC: 1.2 MG/DL (ref 0.76–1.27)
DEPRECATED RDW RBC AUTO: 44.1 FL (ref 37–54)
EGFRCR SERPLBLD CKD-EPI 2021: 75.1 ML/MIN/1.73
EOSINOPHIL # BLD AUTO: 0.07 10*3/MM3 (ref 0–0.4)
EOSINOPHIL NFR BLD AUTO: 1.2 % (ref 0.3–6.2)
ERYTHROCYTE [DISTWIDTH] IN BLOOD BY AUTOMATED COUNT: 13.3 % (ref 12.3–15.4)
GLOBULIN UR ELPH-MCNC: 2.3 GM/DL
GLUCOSE SERPL-MCNC: 111 MG/DL (ref 65–99)
GLUCOSE UR STRIP-MCNC: NEGATIVE MG/DL
HCT VFR BLD AUTO: 37.3 % (ref 37.5–51)
HGB BLD-MCNC: 12.7 G/DL (ref 13–17.7)
HGB UR QL STRIP.AUTO: NEGATIVE
IMM GRANULOCYTES # BLD AUTO: 0.01 10*3/MM3 (ref 0–0.05)
IMM GRANULOCYTES NFR BLD AUTO: 0.2 % (ref 0–0.5)
KETONES UR QL STRIP: ABNORMAL
LEUKOCYTE ESTERASE UR QL STRIP.AUTO: NEGATIVE
LIPASE SERPL-CCNC: 19 U/L (ref 13–60)
LYMPHOCYTES # BLD AUTO: 1.26 10*3/MM3 (ref 0.7–3.1)
LYMPHOCYTES NFR BLD AUTO: 22.5 % (ref 19.6–45.3)
MCH RBC QN AUTO: 30.5 PG (ref 26.6–33)
MCHC RBC AUTO-ENTMCNC: 34 G/DL (ref 31.5–35.7)
MCV RBC AUTO: 89.7 FL (ref 79–97)
MONOCYTES # BLD AUTO: 0.64 10*3/MM3 (ref 0.1–0.9)
MONOCYTES NFR BLD AUTO: 11.4 % (ref 5–12)
NEUTROPHILS NFR BLD AUTO: 3.57 10*3/MM3 (ref 1.7–7)
NEUTROPHILS NFR BLD AUTO: 63.6 % (ref 42.7–76)
NITRITE UR QL STRIP: NEGATIVE
NRBC BLD AUTO-RTO: 0 /100 WBC (ref 0–0.2)
PH UR STRIP.AUTO: 7 [PH] (ref 5–8)
PLATELET # BLD AUTO: 291 10*3/MM3 (ref 140–450)
PMV BLD AUTO: 9.7 FL (ref 6–12)
POTASSIUM SERPL-SCNC: 3.8 MMOL/L (ref 3.5–5.2)
PROT SERPL-MCNC: 7.3 G/DL (ref 6–8.5)
PROT UR QL STRIP: NEGATIVE
RBC # BLD AUTO: 4.16 10*6/MM3 (ref 4.14–5.8)
SODIUM SERPL-SCNC: 141 MMOL/L (ref 136–145)
SP GR UR STRIP: >1.03 (ref 1–1.03)
UROBILINOGEN UR QL STRIP: ABNORMAL
WBC NRBC COR # BLD: 5.61 10*3/MM3 (ref 3.4–10.8)

## 2022-07-20 PROCEDURE — 25010000002 IOPAMIDOL 61 % SOLUTION: Performed by: EMERGENCY MEDICINE

## 2022-07-20 PROCEDURE — 85025 COMPLETE CBC W/AUTO DIFF WBC: CPT | Performed by: EMERGENCY MEDICINE

## 2022-07-20 PROCEDURE — 93005 ELECTROCARDIOGRAM TRACING: CPT

## 2022-07-20 PROCEDURE — 99283 EMERGENCY DEPT VISIT LOW MDM: CPT

## 2022-07-20 PROCEDURE — 83690 ASSAY OF LIPASE: CPT | Performed by: EMERGENCY MEDICINE

## 2022-07-20 PROCEDURE — 74177 CT ABD & PELVIS W/CONTRAST: CPT

## 2022-07-20 PROCEDURE — 81003 URINALYSIS AUTO W/O SCOPE: CPT | Performed by: EMERGENCY MEDICINE

## 2022-07-20 PROCEDURE — 80053 COMPREHEN METABOLIC PANEL: CPT | Performed by: EMERGENCY MEDICINE

## 2022-07-20 RX ORDER — AMOXICILLIN AND CLAVULANATE POTASSIUM 875; 125 MG/1; MG/1
1 TABLET, FILM COATED ORAL 2 TIMES DAILY
Qty: 14 TABLET | Refills: 0 | Status: SHIPPED | OUTPATIENT
Start: 2022-07-20 | End: 2022-08-08

## 2022-07-20 RX ORDER — POLYETHYLENE GLYCOL 3350 17 G/17G
17 POWDER, FOR SOLUTION ORAL DAILY
Qty: 20 EACH | Refills: 0 | Status: SHIPPED | OUTPATIENT
Start: 2022-07-20 | End: 2022-09-17

## 2022-07-20 RX ADMIN — IOPAMIDOL 100 ML: 612 INJECTION, SOLUTION INTRAVENOUS at 03:54

## 2022-07-20 RX ADMIN — SODIUM CHLORIDE 1000 ML: 9 INJECTION, SOLUTION INTRAVENOUS at 02:37

## 2022-07-23 ENCOUNTER — OFFICE VISIT (OUTPATIENT)
Dept: INTERNAL MEDICINE | Facility: CLINIC | Age: 47
End: 2022-07-23

## 2022-07-23 VITALS
OXYGEN SATURATION: 98 % | SYSTOLIC BLOOD PRESSURE: 108 MMHG | RESPIRATION RATE: 16 BRPM | HEART RATE: 82 BPM | TEMPERATURE: 99.3 F | HEIGHT: 72 IN | BODY MASS INDEX: 23.16 KG/M2 | WEIGHT: 171 LBS | DIASTOLIC BLOOD PRESSURE: 62 MMHG

## 2022-07-23 DIAGNOSIS — Z12.11 SCREENING FOR COLON CANCER: ICD-10-CM

## 2022-07-23 DIAGNOSIS — Z09 HOSPITAL DISCHARGE FOLLOW-UP: ICD-10-CM

## 2022-07-23 DIAGNOSIS — K59.00 CONSTIPATION, UNSPECIFIED CONSTIPATION TYPE: Primary | ICD-10-CM

## 2022-07-23 DIAGNOSIS — K62.89 PROCTITIS: ICD-10-CM

## 2022-07-23 PROCEDURE — 99214 OFFICE O/P EST MOD 30 MIN: CPT | Performed by: NURSE PRACTITIONER

## 2022-07-23 RX ORDER — PREDNISONE 1 MG/1
20 TABLET ORAL 2 TIMES DAILY
COMMUNITY
End: 2022-08-08

## 2022-07-23 RX ORDER — BICTEGRAVIR SODIUM, EMTRICITABINE, AND TENOFOVIR ALAFENAMIDE FUMARATE 50; 200; 25 MG/1; MG/1; MG/1
1 TABLET ORAL DAILY
COMMUNITY
Start: 2022-05-17 | End: 2022-09-14

## 2022-07-23 NOTE — PROGRESS NOTES
No chief complaint on file.    Subjective   Fei Ojeda is a 47 y.o. male.     History of Present Illness     Patient presents for ER follow-up of abdominal pain.  He was diagnosed with constipation and proctitis.  He was prescribed Augmentin and MiraLAX and he is taking these as directed.  States his symptoms are improving.  He does have some mild abdominal tenderness but he is stooling normally and afebrile. He reports drinking a lot of water and drinks caffeine daily. Patient is due a colonoscopy and prefers to be referred to [facility]. He is due his annual physical with labs.     CT Abdomen Pelvis With Contrast  Result Date: 7/20/2022  Impression: Normal appendix. No diverticulitis or bowel obstruction. Moderate to large amount of stool in the colon. There is distal rectal wall thickening reason the possibility of proctitis correlate clinically. Authenticated and Electronically Signed by Memo Flores MD on 07/20/2022 05:45:49 AM    Admission on 07/20/2022, Discharged on 07/20/2022   Component Date Value Ref Range Status   • Glucose 07/20/2022 111 (A) 65 - 99 mg/dL Final   • BUN 07/20/2022 18  6 - 20 mg/dL Final   • Creatinine 07/20/2022 1.20  0.76 - 1.27 mg/dL Final   • Sodium 07/20/2022 141  136 - 145 mmol/L Final   • Potassium 07/20/2022 3.8  3.5 - 5.2 mmol/L Final   • Chloride 07/20/2022 101  98 - 107 mmol/L Final   • CO2 07/20/2022 24.7  22.0 - 29.0 mmol/L Final   • Calcium 07/20/2022 9.2  8.6 - 10.5 mg/dL Final   • Total Protein 07/20/2022 7.3  6.0 - 8.5 g/dL Final   • Albumin 07/20/2022 5.00  3.50 - 5.20 g/dL Final   • ALT (SGPT) 07/20/2022 15  1 - 41 U/L Final   • AST (SGOT) 07/20/2022 21  1 - 40 U/L Final   • Alkaline Phosphatase 07/20/2022 63  39 - 117 U/L Final   • Total Bilirubin 07/20/2022 0.2  0.0 - 1.2 mg/dL Final   • Globulin 07/20/2022 2.3  gm/dL Final   • A/G Ratio 07/20/2022 2.2  g/dL Final   • BUN/Creatinine Ratio 07/20/2022 15.0  7.0 - 25.0 Final   • Anion Gap 07/20/2022 15.3 (A)  5.0 - 15.0 mmol/L Final   • eGFR 07/20/2022 75.1  >60.0 mL/min/1.73 Final    National Kidney Foundation and American Society of Nephrology (ASN) Task Force recommended calculation based on the Chronic Kidney Disease Epidemiology Collaboration (CKD-EPI) equation refit without adjustment for race.   • Lipase 07/20/2022 19  13 - 60 U/L Final   • Color, UA 07/20/2022 Yellow  Yellow, Straw Final   • Appearance, UA 07/20/2022 Clear  Clear Final   • pH, UA 07/20/2022 7.0  5.0 - 8.0 Final   • Specific Gravity, UA 07/20/2022 >1.030 (A) 1.005 - 1.030 Final   • Glucose, UA 07/20/2022 Negative  Negative Final   • Ketones, UA 07/20/2022 15 mg/dL (1+) (A) Negative Final   • Bilirubin, UA 07/20/2022 Negative  Negative Final   • Blood, UA 07/20/2022 Negative  Negative Final   • Protein, UA 07/20/2022 Negative  Negative Final   • Leuk Esterase, UA 07/20/2022 Negative  Negative Final   • Nitrite, UA 07/20/2022 Negative  Negative Final   • Urobilinogen, UA 07/20/2022 1.0 E.U./dL  0.2 - 1.0 E.U./dL Final   • WBC 07/20/2022 5.61  3.40 - 10.80 10*3/mm3 Final   • RBC 07/20/2022 4.16  4.14 - 5.80 10*6/mm3 Final   • Hemoglobin 07/20/2022 12.7 (A) 13.0 - 17.7 g/dL Final   • Hematocrit 07/20/2022 37.3 (A) 37.5 - 51.0 % Final   • MCV 07/20/2022 89.7  79.0 - 97.0 fL Final   • MCH 07/20/2022 30.5  26.6 - 33.0 pg Final   • MCHC 07/20/2022 34.0  31.5 - 35.7 g/dL Final   • RDW 07/20/2022 13.3  12.3 - 15.4 % Final   • RDW-SD 07/20/2022 44.1  37.0 - 54.0 fl Final   • MPV 07/20/2022 9.7  6.0 - 12.0 fL Final   • Platelets 07/20/2022 291  140 - 450 10*3/mm3 Final   • Neutrophil % 07/20/2022 63.6  42.7 - 76.0 % Final   • Lymphocyte % 07/20/2022 22.5  19.6 - 45.3 % Final   • Monocyte % 07/20/2022 11.4  5.0 - 12.0 % Final   • Eosinophil % 07/20/2022 1.2  0.3 - 6.2 % Final   • Basophil % 07/20/2022 1.1  0.0 - 1.5 % Final   • Immature Grans % 07/20/2022 0.2  0.0 - 0.5 % Final   • Neutrophils, Absolute 07/20/2022 3.57  1.70 - 7.00 10*3/mm3 Final   •  "Lymphocytes, Absolute 07/20/2022 1.26  0.70 - 3.10 10*3/mm3 Final   • Monocytes, Absolute 07/20/2022 0.64  0.10 - 0.90 10*3/mm3 Final   • Eosinophils, Absolute 07/20/2022 0.07  0.00 - 0.40 10*3/mm3 Final   • Basophils, Absolute 07/20/2022 0.06  0.00 - 0.20 10*3/mm3 Final   • Immature Grans, Absolute 07/20/2022 0.01  0.00 - 0.05 10*3/mm3 Final   • nRBC 07/20/2022 0.0  0.0 - 0.2 /100 WBC Final      The following portions of the patient's history were reviewed and updated as appropriate: allergies, current medications, past family history, past medical history, past social history, past surgical history and problem list.    Review of Systems   Constitutional: Negative for fever.   Gastrointestinal: Positive for abdominal pain, constipation and diarrhea. Negative for nausea and vomiting.   Genitourinary: Positive for frequency. Negative for dysuria and hematuria.   Musculoskeletal: Positive for arthralgias. Negative for myalgias.   Neurological: Negative for headaches.       Objective   /62   Pulse 82   Temp 99.3 °F (37.4 °C) (Temporal)   Resp 16   Ht 182.9 cm (72\")   Wt 77.6 kg (171 lb)   SpO2 98%   BMI 23.19 kg/m²   Body mass index is 23.19 kg/m².  Physical Exam  Vitals and nursing note reviewed.   Constitutional:       Appearance: Normal appearance.   HENT:      Head: Normocephalic and atraumatic.   Cardiovascular:      Rate and Rhythm: Normal rate and regular rhythm.   Pulmonary:      Effort: Pulmonary effort is normal.      Breath sounds: Normal breath sounds.   Abdominal:      General: Abdomen is flat. Bowel sounds are normal. There is no distension.      Palpations: Abdomen is soft. There is no mass.      Tenderness: There is generalized abdominal tenderness. There is no guarding or rebound.      Hernia: No hernia is present.   Neurological:      Mental Status: He is alert and oriented to person, place, and time.   Psychiatric:         Mood and Affect: Mood normal.         Behavior: Behavior normal.  "        Thought Content: Thought content normal.         Judgment: Judgment normal.         Assessment & Plan   Fei Ojeda is here today and the following problems have been addressed:      Diagnoses and all orders for this visit:    1. Constipation (Primary)  -     Ambulatory Referral For Screening Colonoscopy    2. Proctitis  -     Ambulatory Referral For Screening Colonoscopy    3. Screening for colon cancer  -     Ambulatory Referral For Screening Colonoscopy    4. Hospital discharge follow-up    Symptoms are improving. Patient is passing stool and afebrile.  Continue medications as prescribed, increase fiber in diet, exercise and push fluids.  Referral placed to  gastroenterology for colon cancer screening and to establish care.     Follow Up   Return for Annual.  Patient was given instructions and counseling regarding his condition or for health maintenance advice. Please see specific information pulled into the AVS if appropriate.     Lizzie MARADIAGA  [facility] - [place]

## 2022-07-24 PROBLEM — E78.5 ELEVATED LIPIDS: Status: ACTIVE | Noted: 2022-03-31

## 2022-07-24 PROBLEM — F43.10 POSTTRAUMATIC STRESS DISORDER: Status: ACTIVE | Noted: 2020-06-23

## 2022-07-24 PROBLEM — H69.83 DYSFUNCTION OF BOTH EUSTACHIAN TUBES: Status: ACTIVE | Noted: 2021-07-16

## 2022-07-24 PROBLEM — N50.0 TESTICULAR ATROPHY: Status: ACTIVE | Noted: 2021-01-21

## 2022-07-24 PROBLEM — B20 HIV INFECTION (HCC): Status: ACTIVE | Noted: 2019-11-11

## 2022-07-24 PROBLEM — Z21 HIV INFECTION: Status: ACTIVE | Noted: 2019-11-11

## 2022-07-24 PROBLEM — F32.9 MAJOR DEPRESSIVE DISORDER: Status: ACTIVE | Noted: 2020-06-17

## 2022-07-24 PROBLEM — M79.7 FIBROMYALGIA: Status: ACTIVE | Noted: 2021-04-16

## 2022-07-24 PROBLEM — H69.93 DYSFUNCTION OF BOTH EUSTACHIAN TUBES: Status: ACTIVE | Noted: 2021-07-16

## 2022-07-24 PROBLEM — D64.9 ANEMIA: Status: ACTIVE | Noted: 2021-10-22

## 2022-07-27 ENCOUNTER — HOSPITAL ENCOUNTER (EMERGENCY)
Facility: HOSPITAL | Age: 47
Discharge: HOME | End: 2022-07-28
Attending: FAMILY MEDICINE | Admitting: FAMILY MEDICINE

## 2022-07-27 ENCOUNTER — APPOINTMENT (OUTPATIENT)
Dept: CT IMAGING | Facility: HOSPITAL | Age: 47
End: 2022-07-27

## 2022-07-27 DIAGNOSIS — R20.2 FACIAL PARESTHESIA: Primary | ICD-10-CM

## 2022-07-27 LAB
BASOPHILS # BLD AUTO: 0.03 10*3/MM3 (ref 0–0.2)
BASOPHILS NFR BLD AUTO: 0.4 % (ref 0–1.5)
DEPRECATED RDW RBC AUTO: 43.7 FL (ref 37–54)
EOSINOPHIL # BLD AUTO: 0.03 10*3/MM3 (ref 0–0.4)
EOSINOPHIL NFR BLD AUTO: 0.4 % (ref 0.3–6.2)
ERYTHROCYTE [DISTWIDTH] IN BLOOD BY AUTOMATED COUNT: 13.1 % (ref 12.3–15.4)
HCT VFR BLD AUTO: 39.6 % (ref 37.5–51)
HGB BLD-MCNC: 13.2 G/DL (ref 13–17.7)
IMM GRANULOCYTES # BLD AUTO: 0.02 10*3/MM3 (ref 0–0.05)
IMM GRANULOCYTES NFR BLD AUTO: 0.2 % (ref 0–0.5)
LYMPHOCYTES # BLD AUTO: 0.83 10*3/MM3 (ref 0.7–3.1)
LYMPHOCYTES NFR BLD AUTO: 10.3 % (ref 19.6–45.3)
MCH RBC QN AUTO: 30.6 PG (ref 26.6–33)
MCHC RBC AUTO-ENTMCNC: 33.3 G/DL (ref 31.5–35.7)
MCV RBC AUTO: 91.7 FL (ref 79–97)
MONOCYTES # BLD AUTO: 0.11 10*3/MM3 (ref 0.1–0.9)
MONOCYTES NFR BLD AUTO: 1.4 % (ref 5–12)
NEUTROPHILS NFR BLD AUTO: 7.05 10*3/MM3 (ref 1.7–7)
NEUTROPHILS NFR BLD AUTO: 87.3 % (ref 42.7–76)
NRBC BLD AUTO-RTO: 0 /100 WBC (ref 0–0.2)
PLATELET # BLD AUTO: 298 10*3/MM3 (ref 140–450)
PMV BLD AUTO: 10.3 FL (ref 6–12)
RBC # BLD AUTO: 4.32 10*6/MM3 (ref 4.14–5.8)
WBC NRBC COR # BLD: 8.07 10*3/MM3 (ref 3.4–10.8)

## 2022-07-27 PROCEDURE — 36415 COLL VENOUS BLD VENIPUNCTURE: CPT

## 2022-07-27 PROCEDURE — 99283 EMERGENCY DEPT VISIT LOW MDM: CPT

## 2022-07-27 PROCEDURE — 80053 COMPREHEN METABOLIC PANEL: CPT | Performed by: FAMILY MEDICINE

## 2022-07-27 PROCEDURE — 70450 CT HEAD/BRAIN W/O DYE: CPT

## 2022-07-27 PROCEDURE — 85652 RBC SED RATE AUTOMATED: CPT | Performed by: FAMILY MEDICINE

## 2022-07-27 PROCEDURE — 93005 ELECTROCARDIOGRAM TRACING: CPT

## 2022-07-27 PROCEDURE — 86140 C-REACTIVE PROTEIN: CPT | Performed by: FAMILY MEDICINE

## 2022-07-27 PROCEDURE — 85025 COMPLETE CBC W/AUTO DIFF WBC: CPT | Performed by: FAMILY MEDICINE

## 2022-07-27 RX ORDER — SODIUM CHLORIDE 0.9 % (FLUSH) 0.9 %
10 SYRINGE (ML) INJECTION AS NEEDED
Status: DISCONTINUED | OUTPATIENT
Start: 2022-07-27 | End: 2022-07-28 | Stop reason: HOSPADM

## 2022-07-28 VITALS
DIASTOLIC BLOOD PRESSURE: 96 MMHG | TEMPERATURE: 98 F | SYSTOLIC BLOOD PRESSURE: 121 MMHG | BODY MASS INDEX: 23.78 KG/M2 | RESPIRATION RATE: 14 BRPM | HEART RATE: 60 BPM | HEIGHT: 72 IN | WEIGHT: 175.6 LBS | OXYGEN SATURATION: 99 %

## 2022-07-28 LAB
ALBUMIN SERPL-MCNC: 4.4 G/DL (ref 3.5–5.2)
ALBUMIN/GLOB SERPL: 1.8 G/DL
ALP SERPL-CCNC: 49 U/L (ref 39–117)
ALT SERPL W P-5'-P-CCNC: 10 U/L (ref 1–41)
ANION GAP SERPL CALCULATED.3IONS-SCNC: 10.7 MMOL/L (ref 5–15)
AST SERPL-CCNC: 13 U/L (ref 1–40)
BILIRUB SERPL-MCNC: 0.2 MG/DL (ref 0–1.2)
BUN SERPL-MCNC: 16 MG/DL (ref 6–20)
BUN/CREAT SERPL: 15.2 (ref 7–25)
CALCIUM SPEC-SCNC: 9.3 MG/DL (ref 8.6–10.5)
CHLORIDE SERPL-SCNC: 101 MMOL/L (ref 98–107)
CO2 SERPL-SCNC: 26.3 MMOL/L (ref 22–29)
CREAT SERPL-MCNC: 1.05 MG/DL (ref 0.76–1.27)
CRP SERPL-MCNC: <0.3 MG/DL (ref 0–0.5)
EGFRCR SERPLBLD CKD-EPI 2021: 88.1 ML/MIN/1.73
ERYTHROCYTE [SEDIMENTATION RATE] IN BLOOD: 6 MM/HR (ref 0–15)
GLOBULIN UR ELPH-MCNC: 2.5 GM/DL
GLUCOSE SERPL-MCNC: 136 MG/DL (ref 65–99)
POTASSIUM SERPL-SCNC: 4.4 MMOL/L (ref 3.5–5.2)
PROT SERPL-MCNC: 6.9 G/DL (ref 6–8.5)
SODIUM SERPL-SCNC: 138 MMOL/L (ref 136–145)

## 2022-07-28 PROCEDURE — 25010000002 DEXAMETHASONE SODIUM PHOSPHATE 10 MG/ML SOLUTION: Performed by: FAMILY MEDICINE

## 2022-07-28 PROCEDURE — 96374 THER/PROPH/DIAG INJ IV PUSH: CPT

## 2022-07-28 PROCEDURE — 96372 THER/PROPH/DIAG INJ SC/IM: CPT

## 2022-07-28 PROCEDURE — 25010000002 KETOROLAC TROMETHAMINE PER 15 MG: Performed by: FAMILY MEDICINE

## 2022-07-28 RX ORDER — KETOROLAC TROMETHAMINE 30 MG/ML
15 INJECTION, SOLUTION INTRAMUSCULAR; INTRAVENOUS ONCE
Status: COMPLETED | OUTPATIENT
Start: 2022-07-28 | End: 2022-07-28

## 2022-07-28 RX ORDER — DEXAMETHASONE SODIUM PHOSPHATE 10 MG/ML
10 INJECTION, SOLUTION INTRAMUSCULAR; INTRAVENOUS ONCE
Status: COMPLETED | OUTPATIENT
Start: 2022-07-28 | End: 2022-07-28

## 2022-07-28 RX ADMIN — DEXAMETHASONE SODIUM PHOSPHATE 10 MG: 10 INJECTION INTRAMUSCULAR; INTRAVENOUS at 01:20

## 2022-07-28 RX ADMIN — KETOROLAC TROMETHAMINE 15 MG: 30 INJECTION, SOLUTION INTRAMUSCULAR; INTRAVENOUS at 01:24

## 2022-08-01 ENCOUNTER — HOSPITAL ENCOUNTER (OUTPATIENT)
Dept: CT IMAGING | Facility: HOSPITAL | Age: 47
Discharge: HOME | End: 2022-08-01
Admitting: NURSE PRACTITIONER

## 2022-08-01 DIAGNOSIS — R91.1 PULMONARY NODULE LESS THAN 6 MM IN DIAMETER WITH LOW RISK FOR MALIGNANT NEOPLASM: ICD-10-CM

## 2022-08-01 DIAGNOSIS — Z91.89 PULMONARY NODULE LESS THAN 6 MM IN DIAMETER WITH LOW RISK FOR MALIGNANT NEOPLASM: ICD-10-CM

## 2022-08-01 PROCEDURE — 25010000002 IOPAMIDOL 61 % SOLUTION: Performed by: NURSE PRACTITIONER

## 2022-08-01 PROCEDURE — 71270 CT THORAX DX C-/C+: CPT

## 2022-08-01 RX ADMIN — IOPAMIDOL 100 ML: 612 INJECTION, SOLUTION INTRAVENOUS at 19:11

## 2022-08-02 ENCOUNTER — PATIENT MESSAGE (OUTPATIENT)
Dept: INTERNAL MEDICINE | Facility: CLINIC | Age: 47
End: 2022-08-02

## 2022-08-02 RX ORDER — BLOOD-GLUCOSE METER
1 KIT MISCELLANEOUS AS NEEDED
Qty: 1 EACH | Refills: 1 | Status: SHIPPED | OUTPATIENT
Start: 2022-08-02

## 2022-08-02 NOTE — TELEPHONE ENCOUNTER
From: Fei Ojeda  To: ASHWINI Garcia  Sent: 8/2/2022 11:52 AM EDT  Subject: Glucose monitor     Hey will you send over a prescription for a glucose monitor and test strips to [facility]. Mine stopped working and I need a new one. I like to keep an eye on my blood sugar as it runs in my family. Thank you.

## 2022-08-08 ENCOUNTER — OFFICE VISIT (OUTPATIENT)
Dept: INTERNAL MEDICINE | Facility: CLINIC | Age: 47
End: 2022-08-08

## 2022-08-08 ENCOUNTER — LAB (OUTPATIENT)
Dept: LAB | Facility: HOSPITAL | Age: 47
End: 2022-08-08

## 2022-08-08 ENCOUNTER — HOSPITAL ENCOUNTER (OUTPATIENT)
Dept: GENERAL RADIOLOGY | Facility: HOSPITAL | Age: 47
Discharge: HOME | End: 2022-08-08

## 2022-08-08 VITALS
SYSTOLIC BLOOD PRESSURE: 120 MMHG | BODY MASS INDEX: 23.3 KG/M2 | HEART RATE: 81 BPM | WEIGHT: 172 LBS | TEMPERATURE: 97.2 F | HEIGHT: 72 IN | OXYGEN SATURATION: 99 % | DIASTOLIC BLOOD PRESSURE: 76 MMHG

## 2022-08-08 DIAGNOSIS — Z13.228 SCREENING FOR ENDOCRINE, METABOLIC AND IMMUNITY DISORDER: ICD-10-CM

## 2022-08-08 DIAGNOSIS — R20.0 LEFT FACIAL NUMBNESS: ICD-10-CM

## 2022-08-08 DIAGNOSIS — Z12.5 SCREENING FOR PROSTATE CANCER: ICD-10-CM

## 2022-08-08 DIAGNOSIS — Z00.00 ANNUAL PHYSICAL EXAM: Primary | ICD-10-CM

## 2022-08-08 DIAGNOSIS — J30.9 CHRONIC ALLERGIC RHINITIS: ICD-10-CM

## 2022-08-08 DIAGNOSIS — Z13.29 SCREENING FOR ENDOCRINE, METABOLIC AND IMMUNITY DISORDER: ICD-10-CM

## 2022-08-08 DIAGNOSIS — R01.1 HEART MURMUR: ICD-10-CM

## 2022-08-08 DIAGNOSIS — Z13.0 SCREENING FOR ENDOCRINE, METABOLIC AND IMMUNITY DISORDER: ICD-10-CM

## 2022-08-08 DIAGNOSIS — H91.90 HEARING LOSS, UNSPECIFIED HEARING LOSS TYPE, UNSPECIFIED LATERALITY: ICD-10-CM

## 2022-08-08 DIAGNOSIS — G89.29 CHRONIC BILATERAL LOW BACK PAIN WITHOUT SCIATICA: ICD-10-CM

## 2022-08-08 DIAGNOSIS — M54.50 CHRONIC BILATERAL LOW BACK PAIN WITHOUT SCIATICA: ICD-10-CM

## 2022-08-08 DIAGNOSIS — Z13.0 SCREENING FOR DISORDER OF BLOOD AND BLOOD-FORMING ORGANS: ICD-10-CM

## 2022-08-08 DIAGNOSIS — R53.83 FATIGUE, UNSPECIFIED TYPE: ICD-10-CM

## 2022-08-08 DIAGNOSIS — Z13.220 SCREENING FOR LIPID DISORDERS: ICD-10-CM

## 2022-08-08 DIAGNOSIS — H69.83 DYSFUNCTION OF BOTH EUSTACHIAN TUBES: ICD-10-CM

## 2022-08-08 LAB
ALBUMIN SERPL-MCNC: 4.6 G/DL (ref 3.5–5.2)
ALBUMIN/GLOB SERPL: 1.9 G/DL
ALP SERPL-CCNC: 48 U/L (ref 39–117)
ALT SERPL W P-5'-P-CCNC: 14 U/L (ref 1–41)
ANION GAP SERPL CALCULATED.3IONS-SCNC: 12.1 MMOL/L (ref 5–15)
AST SERPL-CCNC: 21 U/L (ref 1–40)
BASOPHILS # BLD AUTO: 0.06 10*3/MM3 (ref 0–0.2)
BASOPHILS NFR BLD AUTO: 0.6 % (ref 0–1.5)
BILIRUB SERPL-MCNC: 0.3 MG/DL (ref 0–1.2)
BUN SERPL-MCNC: 15 MG/DL (ref 6–20)
BUN/CREAT SERPL: 14.6 (ref 7–25)
CALCIUM SPEC-SCNC: 9.3 MG/DL (ref 8.6–10.5)
CHLORIDE SERPL-SCNC: 101 MMOL/L (ref 98–107)
CHOLEST SERPL-MCNC: 232 MG/DL (ref 0–200)
CO2 SERPL-SCNC: 24.9 MMOL/L (ref 22–29)
CREAT SERPL-MCNC: 1.03 MG/DL (ref 0.76–1.27)
DEPRECATED RDW RBC AUTO: 46.1 FL (ref 37–54)
EGFRCR SERPLBLD CKD-EPI 2021: 90.2 ML/MIN/1.73
EOSINOPHIL # BLD AUTO: 0.16 10*3/MM3 (ref 0–0.4)
EOSINOPHIL NFR BLD AUTO: 1.6 % (ref 0.3–6.2)
ERYTHROCYTE [DISTWIDTH] IN BLOOD BY AUTOMATED COUNT: 13.8 % (ref 12.3–15.4)
GLOBULIN UR ELPH-MCNC: 2.4 GM/DL
GLUCOSE SERPL-MCNC: 95 MG/DL (ref 65–99)
HCT VFR BLD AUTO: 43.9 % (ref 37.5–51)
HDLC SERPL-MCNC: 71 MG/DL (ref 40–60)
HGB BLD-MCNC: 14.3 G/DL (ref 13–17.7)
IMM GRANULOCYTES # BLD AUTO: 0.03 10*3/MM3 (ref 0–0.05)
IMM GRANULOCYTES NFR BLD AUTO: 0.3 % (ref 0–0.5)
IRON 24H UR-MRATE: 59 MCG/DL (ref 59–158)
LDLC SERPL CALC-MCNC: 134 MG/DL (ref 0–100)
LDLC/HDLC SERPL: 1.83 {RATIO}
LYMPHOCYTES # BLD AUTO: 2.98 10*3/MM3 (ref 0.7–3.1)
LYMPHOCYTES NFR BLD AUTO: 29.2 % (ref 19.6–45.3)
MCH RBC QN AUTO: 29.7 PG (ref 26.6–33)
MCHC RBC AUTO-ENTMCNC: 32.6 G/DL (ref 31.5–35.7)
MCV RBC AUTO: 91.1 FL (ref 79–97)
MONOCYTES # BLD AUTO: 0.91 10*3/MM3 (ref 0.1–0.9)
MONOCYTES NFR BLD AUTO: 8.9 % (ref 5–12)
NEUTROPHILS NFR BLD AUTO: 59.4 % (ref 42.7–76)
NEUTROPHILS NFR BLD AUTO: 6.05 10*3/MM3 (ref 1.7–7)
NRBC BLD AUTO-RTO: 0 /100 WBC (ref 0–0.2)
PLATELET # BLD AUTO: 286 10*3/MM3 (ref 140–450)
PMV BLD AUTO: 10.5 FL (ref 6–12)
POTASSIUM SERPL-SCNC: 3.9 MMOL/L (ref 3.5–5.2)
PROT SERPL-MCNC: 7 G/DL (ref 6–8.5)
PSA SERPL-MCNC: 0.62 NG/ML (ref 0–4)
RBC # BLD AUTO: 4.82 10*6/MM3 (ref 4.14–5.8)
SODIUM SERPL-SCNC: 138 MMOL/L (ref 136–145)
T4 FREE SERPL-MCNC: 1.01 NG/DL (ref 0.93–1.7)
TRIGL SERPL-MCNC: 156 MG/DL (ref 0–150)
TSH SERPL DL<=0.05 MIU/L-ACNC: 2.92 UIU/ML (ref 0.27–4.2)
VLDLC SERPL-MCNC: 27 MG/DL (ref 5–40)
WBC NRBC COR # BLD: 10.19 10*3/MM3 (ref 3.4–10.8)

## 2022-08-08 PROCEDURE — 80061 LIPID PANEL: CPT | Performed by: NURSE PRACTITIONER

## 2022-08-08 PROCEDURE — 82607 VITAMIN B-12: CPT | Performed by: NURSE PRACTITIONER

## 2022-08-08 PROCEDURE — 80050 GENERAL HEALTH PANEL: CPT | Performed by: NURSE PRACTITIONER

## 2022-08-08 PROCEDURE — 83540 ASSAY OF IRON: CPT | Performed by: NURSE PRACTITIONER

## 2022-08-08 PROCEDURE — 82306 VITAMIN D 25 HYDROXY: CPT | Performed by: NURSE PRACTITIONER

## 2022-08-08 PROCEDURE — 99214 OFFICE O/P EST MOD 30 MIN: CPT | Performed by: NURSE PRACTITIONER

## 2022-08-08 PROCEDURE — G0103 PSA SCREENING: HCPCS | Performed by: NURSE PRACTITIONER

## 2022-08-08 PROCEDURE — 72100 X-RAY EXAM L-S SPINE 2/3 VWS: CPT

## 2022-08-08 PROCEDURE — 36415 COLL VENOUS BLD VENIPUNCTURE: CPT | Performed by: NURSE PRACTITIONER

## 2022-08-08 PROCEDURE — 84439 ASSAY OF FREE THYROXINE: CPT | Performed by: NURSE PRACTITIONER

## 2022-08-08 RX ORDER — LANCETS 33 GAUGE
EACH MISCELLANEOUS
COMMUNITY
Start: 2022-08-02

## 2022-08-08 RX ORDER — BLOOD SUGAR DIAGNOSTIC
STRIP MISCELLANEOUS
COMMUNITY
Start: 2022-08-02

## 2022-08-08 RX ORDER — PREDNISONE 20 MG/1
20 TABLET ORAL 2 TIMES DAILY
COMMUNITY
Start: 2022-07-25 | End: 2022-09-17

## 2022-08-08 NOTE — PROGRESS NOTES
Chief Complaint   Patient presents with   • Annual Exam     CARDIOLOGY AND NEURO REFERRAL NEEDED       Fei Ojeda is a 47 y.o. male and is here for a comprehensive physical exam. The patient reports being seen at [facility] ED twice in the past month.  Evaluated [facility] ED on 2022 with several days of abdominal pain.  CT abdomen and pelvis revealed evidence of moderate stool burden, possible proctitis.  Return to [facility] ED on 2022 with left-sided facial paresthesia that has been present for a week.  Has intermittent episodes of trigeminal neuralgia.  Taking prednisone, which has been effective in the past.    Fluid behind his ears.  No otitis media. Feels like his head is being pushed underwater when he talks.  Recent evaluation per ENT did not find cause for symptoms; found slight hearing loss.      Heart murmur.  Would like to be referred to cardiology. Denies chest pain, dyspnea, orthopnea, palpitations, lower extremity edema, confusion, headaches, weakness, visual disturbances.    He does suffer from HIV and is managed by infectious disease through . Taking Biktarvy as prescribed.    New job, works as  with the state.  Off probation on .     PTSD: father  suddenly in .  James was diagnosed with AIDS the following year, doesn't feel he had time to process either his father's death or his own diagnosis of HIV.        Lost 167 pounds purposefully by following Weight Watchers diet several years ago.      Slipped on stairs 4 years ago, no evaluation at the time.  Hit his tailbone.  Has been having more pain in lumbosacral back, particularly with certain activity.          History:  Erectile dysfunction:  no  Nocturia: occasionally  Sexually active: homosexual, uses condoms    Do you take any herbs or supplements that were not prescribed by a doctor? no    Health Habits:  Dental Exam. up to date  Eye Exam. up to date, wears glasses or contacts  Exercise: 7 times/week.  Current  exercise activities include: walking and uses stairs not elevator    Health Maintenance   Topic Date Due   • COLORECTAL CANCER SCREENING  Never done   • COVID-19 Vaccine (3 - Mixed Product risk series) 2021   • ANNUAL PHYSICAL  2022 (Originally 1978)   • Hepatitis B (2 of 3 - Risk 3-dose series) 2023 (Originally 2/15/2000)   • TDAP/TD VACCINES (2 - Tdap) 2023 (Originally 2004)   • INFLUENZA VACCINE  10/01/2022   • LIPID PANEL  2023   • DXA SCAN  2024   • Pneumococcal Vaccine 0-64 (4 - PPSV23 or PCV20) 2040   • HEPATITIS C SCREENING  Completed       PMH, PSH, SocHx, FamHx, Allergies, and Medications: Reviewed and updated in the Visit Navigator.     Allergies   Allergen Reactions   • Erythromycin GI Intolerance     Past Medical History:   Diagnosis Date   • Acid reflux    • Allergic     Most of my life   • Anemia    • Anxiety    • Depression    • Fibromyalgia    • Fibromyalgia, primary 2017    Newly diagnosed last year   • Heart murmur    • History of medical problems    • HIV antibody positive (HCC)    • HIV disease (HCC) 2102   • Hyperlipidemia    • Osteopenia      Past Surgical History:   Procedure Laterality Date   • VASCULAR SURGERY Right 2021   • VASCULAR SURGERY Left    • WISDOM TOOTH EXTRACTION       Social History     Socioeconomic History   • Marital status: Single   Tobacco Use   • Smoking status: Former Smoker     Packs/day: 2.00     Years: 7.00     Pack years: 14.00     Types: Cigarettes, Cigarettes     Quit date: 2017     Years since quittin.6   • Smokeless tobacco: Never Used   Vaping Use   • Vaping Use: Never used   Substance and Sexual Activity   • Alcohol use: No   • Drug use: No   • Sexual activity: Yes     Partners: Male     Birth control/protection: Condom     Family History   Problem Relation Age of Onset   • Hypertension Mother    • Diabetes Mother    • Hyperlipidemia Mother    • Arthritis Mother    • Obesity Mother    •  "Osteoporosis Mother    • Thyroid disease Mother    • COPD Father    • Asthma Maternal Grandmother    • Stroke Paternal Grandmother    • Diabetes Paternal Grandmother        Review of Systems  Review of Systems   All other systems reviewed and are negative.      Objective   /76   Pulse 81   Temp 97.2 °F (36.2 °C)   Ht 182.9 cm (72.01\")   Wt 78 kg (172 lb)   SpO2 99%   BMI 23.32 kg/m²   Body mass index is 23.32 kg/m².    Physical Exam  Constitutional:       Appearance: He is well-developed. He is not ill-appearing.   HENT:      Head: Normocephalic.      Right Ear: Tympanic membrane, ear canal and external ear normal.      Left Ear: Tympanic membrane, ear canal and external ear normal.      Nose: Nose normal.      Mouth/Throat:      Mouth: Mucous membranes are moist.      Pharynx: Oropharynx is clear. Uvula midline.   Eyes:      Extraocular Movements: Extraocular movements intact.      Conjunctiva/sclera: Conjunctivae normal.      Pupils: Pupils are equal, round, and reactive to light.   Neck:      Thyroid: No thyromegaly.      Vascular: No carotid bruit.   Cardiovascular:      Rate and Rhythm: Normal rate and regular rhythm.      Pulses:           Radial pulses are 2+ on the right side and 2+ on the left side.        Dorsalis pedis pulses are 2+ on the right side and 2+ on the left side.      Heart sounds: Murmur heard.   Pulmonary:      Effort: Pulmonary effort is normal.      Breath sounds: Normal breath sounds.   Abdominal:      General: Bowel sounds are normal.      Palpations: Abdomen is soft.      Tenderness: There is no abdominal tenderness.   Musculoskeletal:         General: No tenderness or deformity. Normal range of motion.      Cervical back: Full passive range of motion without pain, normal range of motion and neck supple.      Right lower leg: No edema.      Left lower leg: No edema.   Lymphadenopathy:      Cervical: No cervical adenopathy.   Skin:     General: Skin is warm.      Capillary " Refill: Capillary refill takes less than 2 seconds.   Neurological:      Mental Status: He is alert and oriented to person, place, and time.      Sensory: No sensory deficit.      Coordination: Coordination normal.      Gait: Gait normal.      Comments: CN II-XII normal   Psychiatric:         Attention and Perception: Attention normal.         Mood and Affect: Mood and affect normal.         Speech: Speech normal.         Behavior: Behavior normal.         Thought Content: Thought content normal.         The 10-year CVD risk score (LISA'Agostino, et al., 2008) is: 5.7%    Values used to calculate the score:      Age: 47 years      Sex: Male      Diabetic: No      Tobacco smoker: No      Systolic Blood Pressure: 126 mmHg      Is BP treated: No      HDL Cholesterol: 71 mg/dL      Total Cholesterol: 232 mg/dL    Assessment & Plan   1. Healthy male exam.    2. Patient Counseling: Including but not Limited to the following, when appropriate:  --Nutrition: Stressed importance of moderation in sodium/caffeine intake, saturated fat and cholesterol, caloric balance, sufficient intake of fresh fruits, vegetables, fiber.  --Exercise: Stressed the importance of regular exercise.   --Substance Abuse: Discussed cessation/primary prevention of tobacco, alcohol, or other drug use; driving or other dangerous activities under the influence; availability of treatment for abuse, as indicated based on social history.    --Sexuality: Discussed sexually transmitted diseases, partner selection, use of condoms and contraceptive alternatives.   --Injury prevention: Discussed safety belts, safety helmets, smoke detector, smoking near bedding or upholstery.   --Dental health: Discussed importance of regular tooth brushing, flossing, and dental visits.  --Immunizations reviewed.  --Discussed benefits of colon cancer screening.  3. Discussed the patient's BMI with him.  The BMI is in the acceptable range  4. Return in about 1 year (around 8/8/2023)  for Annual.  5. Age-appropriate Screening Scheduled    Assessment & Plan     Diagnoses and all orders for this visit:    1. Annual physical exam (Primary)    2. Chronic allergic rhinitis  -     Daily antihistamine such as claritin, Allegra or Zyrtec        - Avoid known allergy triggers when possible, wash hands and face after exposure.  Consider changing clothes.        - HEPA filter in vacuum and HVAC        - Change/launder bed linens at least once a week        - Vacuum carpeted areas in the home, 2-3 times a week    3. Dysfunction of both eustachian tubes  -     Discussed referral to different ENT. Patient politely declined    4. Hearing loss, unspecified hearing loss type, unspecified laterality  -     Will consider referral to audiology    5. Heart murmur  -     Ambulatory Referral to Cardiology    6. Left facial numbness  -     Ambulatory Referral to Neurology    7. Fatigue, unspecified type  -     Iron  -     Vitamin D 25 Hydroxy  -     Vitamin B12    8. Chronic bilateral low back pain without sciatica  -     XR spine lumbar 2 or 3 vw    9. Screening for prostate cancer  -     PSA SCREENING    10. Screening for disorder of blood and blood-forming organs  -     CBC & Differential    11. Screening for endocrine, metabolic and immunity disorder  -     Comprehensive Metabolic Panel  -     TSH  -     T4, Free    12. Screening for lipid disorders  -     Lipid Panel

## 2022-08-09 ENCOUNTER — APPOINTMENT (OUTPATIENT)
Dept: BONE DENSITY | Facility: HOSPITAL | Age: 47
End: 2022-08-09

## 2022-08-09 LAB
25(OH)D3 SERPL-MCNC: 26.3 NG/ML (ref 30–100)
VIT B12 BLD-MCNC: 212 PG/ML (ref 211–946)

## 2022-08-09 PROCEDURE — 77080 DXA BONE DENSITY AXIAL: CPT

## 2022-08-11 ENCOUNTER — HOSPITAL ENCOUNTER (EMERGENCY)
Facility: HOSPITAL | Age: 47
Discharge: HOME | End: 2022-08-11
Attending: EMERGENCY MEDICINE | Admitting: EMERGENCY MEDICINE

## 2022-08-11 ENCOUNTER — APPOINTMENT (OUTPATIENT)
Dept: GENERAL RADIOLOGY | Facility: HOSPITAL | Age: 47
End: 2022-08-11

## 2022-08-11 ENCOUNTER — APPOINTMENT (OUTPATIENT)
Dept: CT IMAGING | Facility: HOSPITAL | Age: 47
End: 2022-08-11

## 2022-08-11 VITALS
HEIGHT: 72 IN | TEMPERATURE: 98.1 F | SYSTOLIC BLOOD PRESSURE: 126 MMHG | WEIGHT: 185 LBS | HEART RATE: 79 BPM | BODY MASS INDEX: 25.06 KG/M2 | DIASTOLIC BLOOD PRESSURE: 78 MMHG | OXYGEN SATURATION: 99 % | RESPIRATION RATE: 18 BRPM

## 2022-08-11 DIAGNOSIS — K59.00 CONSTIPATION, UNSPECIFIED CONSTIPATION TYPE: Primary | ICD-10-CM

## 2022-08-11 DIAGNOSIS — K62.9 RECTAL ABNORMALITY: ICD-10-CM

## 2022-08-11 LAB
ALBUMIN SERPL-MCNC: 4.1 G/DL (ref 3.5–5.2)
ALBUMIN/GLOB SERPL: 1.5 G/DL
ALP SERPL-CCNC: 53 U/L (ref 39–117)
ALT SERPL W P-5'-P-CCNC: 26 U/L (ref 1–41)
ANION GAP SERPL CALCULATED.3IONS-SCNC: 7.7 MMOL/L (ref 5–15)
AST SERPL-CCNC: 33 U/L (ref 1–40)
BASOPHILS # BLD AUTO: 0.04 10*3/MM3 (ref 0–0.2)
BASOPHILS NFR BLD AUTO: 0.6 % (ref 0–1.5)
BILIRUB SERPL-MCNC: 0.3 MG/DL (ref 0–1.2)
BUN SERPL-MCNC: 12 MG/DL (ref 6–20)
BUN/CREAT SERPL: 11.7 (ref 7–25)
CALCIUM SPEC-SCNC: 9 MG/DL (ref 8.6–10.5)
CHLORIDE SERPL-SCNC: 102 MMOL/L (ref 98–107)
CO2 SERPL-SCNC: 28.3 MMOL/L (ref 22–29)
CREAT SERPL-MCNC: 1.03 MG/DL (ref 0.76–1.27)
DEPRECATED RDW RBC AUTO: 45.1 FL (ref 37–54)
EGFRCR SERPLBLD CKD-EPI 2021: 90.2 ML/MIN/1.73
EOSINOPHIL # BLD AUTO: 0.07 10*3/MM3 (ref 0–0.4)
EOSINOPHIL NFR BLD AUTO: 1.1 % (ref 0.3–6.2)
ERYTHROCYTE [DISTWIDTH] IN BLOOD BY AUTOMATED COUNT: 13.7 % (ref 12.3–15.4)
GLOBULIN UR ELPH-MCNC: 2.8 GM/DL
GLUCOSE SERPL-MCNC: 104 MG/DL (ref 65–99)
HCT VFR BLD AUTO: 39.7 % (ref 37.5–51)
HGB BLD-MCNC: 13.4 G/DL (ref 13–17.7)
HOLD SPECIMEN: NORMAL
HOLD SPECIMEN: NORMAL
IMM GRANULOCYTES # BLD AUTO: 0.01 10*3/MM3 (ref 0–0.05)
IMM GRANULOCYTES NFR BLD AUTO: 0.2 % (ref 0–0.5)
LIPASE SERPL-CCNC: 19 U/L (ref 13–60)
LYMPHOCYTES # BLD AUTO: 1.44 10*3/MM3 (ref 0.7–3.1)
LYMPHOCYTES NFR BLD AUTO: 21.9 % (ref 19.6–45.3)
MCH RBC QN AUTO: 30.5 PG (ref 26.6–33)
MCHC RBC AUTO-ENTMCNC: 33.8 G/DL (ref 31.5–35.7)
MCV RBC AUTO: 90.2 FL (ref 79–97)
MONOCYTES # BLD AUTO: 0.69 10*3/MM3 (ref 0.1–0.9)
MONOCYTES NFR BLD AUTO: 10.5 % (ref 5–12)
NEUTROPHILS NFR BLD AUTO: 4.34 10*3/MM3 (ref 1.7–7)
NEUTROPHILS NFR BLD AUTO: 65.7 % (ref 42.7–76)
NRBC BLD AUTO-RTO: 0 /100 WBC (ref 0–0.2)
PLATELET # BLD AUTO: 233 10*3/MM3 (ref 140–450)
PMV BLD AUTO: 10 FL (ref 6–12)
POTASSIUM SERPL-SCNC: 4.2 MMOL/L (ref 3.5–5.2)
PROT SERPL-MCNC: 6.9 G/DL (ref 6–8.5)
RBC # BLD AUTO: 4.4 10*6/MM3 (ref 4.14–5.8)
SODIUM SERPL-SCNC: 138 MMOL/L (ref 136–145)
TROPONIN T SERPL-MCNC: <0.01 NG/ML (ref 0–0.03)
WBC NRBC COR # BLD: 6.59 10*3/MM3 (ref 3.4–10.8)
WHOLE BLOOD HOLD COAG: NORMAL
WHOLE BLOOD HOLD SPECIMEN: NORMAL

## 2022-08-11 PROCEDURE — 84484 ASSAY OF TROPONIN QUANT: CPT | Performed by: EMERGENCY MEDICINE

## 2022-08-11 PROCEDURE — 96374 THER/PROPH/DIAG INJ IV PUSH: CPT

## 2022-08-11 PROCEDURE — 93005 ELECTROCARDIOGRAM TRACING: CPT

## 2022-08-11 PROCEDURE — 25010000002 KETOROLAC TROMETHAMINE PER 15 MG: Performed by: EMERGENCY MEDICINE

## 2022-08-11 PROCEDURE — 80053 COMPREHEN METABOLIC PANEL: CPT | Performed by: EMERGENCY MEDICINE

## 2022-08-11 PROCEDURE — 74176 CT ABD & PELVIS W/O CONTRAST: CPT

## 2022-08-11 PROCEDURE — 85025 COMPLETE CBC W/AUTO DIFF WBC: CPT | Performed by: EMERGENCY MEDICINE

## 2022-08-11 PROCEDURE — 99283 EMERGENCY DEPT VISIT LOW MDM: CPT

## 2022-08-11 PROCEDURE — 83690 ASSAY OF LIPASE: CPT | Performed by: EMERGENCY MEDICINE

## 2022-08-11 PROCEDURE — 71045 X-RAY EXAM CHEST 1 VIEW: CPT

## 2022-08-11 RX ORDER — SODIUM CHLORIDE 0.9 % (FLUSH) 0.9 %
10 SYRINGE (ML) INJECTION AS NEEDED
Status: DISCONTINUED | OUTPATIENT
Start: 2022-08-11 | End: 2022-08-11 | Stop reason: HOSPADM

## 2022-08-11 RX ORDER — KETOROLAC TROMETHAMINE 30 MG/ML
30 INJECTION, SOLUTION INTRAMUSCULAR; INTRAVENOUS ONCE
Status: COMPLETED | OUTPATIENT
Start: 2022-08-11 | End: 2022-08-11

## 2022-08-11 RX ADMIN — LIDOCAINE HYDROCHLORIDE: 20 SOLUTION ORAL; TOPICAL at 06:21

## 2022-08-11 RX ADMIN — KETOROLAC TROMETHAMINE 30 MG: 30 INJECTION, SOLUTION INTRAMUSCULAR; INTRAVENOUS at 06:20

## 2022-08-11 NOTE — ED PROVIDER NOTES
Kiley Ledesma   1/18/2017 9:30 AM   Office Visit    Dept Phone:  [phone]   Encounter #:  [id number]    Provider:  Greta Stoddard MD   Department:  [facility]                Your Full Care Plan              Your Updated Medication List          This list is accurate as of: 1/18/17 10:06 AM.  Always use your most recent med list.                acetaminophen 325 MG tablet   Commonly known as:  TYLENOL       albuterol (2.5 MG/3ML) 0.083% nebulizer solution   Commonly known as:  PROVENTIL       BACLOFEN IT       BENADRYL ALLERGY 25 MG tablet   Generic drug:  diphenhydrAMINE       Cholecalciferol 1000 UNITS capsule       cholestyramine light 4 G packet   DISSOLVE ONE PACKET IN 8OZ WATER & GIVE BY MOUTH TWICE DAILY AT 9AM &5PM FOR CONSTIPATION - NO OTHER MEDICATIONS FOR 1 HOUR       estradiol 1 MG tablet   Commonly known as:  ESTRACE       FLUoxetine 40 MG capsule   Commonly known as:  PROzac       fluticasone 50 MCG/ACT nasal spray   Commonly known as:  FLONASE       guaiFENesin 600 MG 12 hr tablet   Commonly known as:  MUCINEX       metoclopramide 10 MG tablet   Commonly known as:  REGLAN       nystatin 095022 UNIT/GM cream   Commonly known as:  MYCOSTATIN       omeprazole 20 MG capsule   Commonly known as:  priLOSEC       ondansetron ODT 8 MG disintegrating tablet   Commonly known as:  ZOFRAN-ODT       VESICARE 10 MG tablet   Generic drug:  solifenacin       vitamin A & D ointment       Wheelchair misc               Instructions     None    Patient Instructions History      Upcoming Appointments     Visit Type Date Time Department    OFFICE VISIT 1/18/2017  9:30 AM [facility]    OFFICE VISIT 7/5/2017  1:00 PM [facility] GASTROENT  [facility]    PHYSICAL 7/19/2017 10:00 AM [facility]      MyChart Signup     Our records indicate that you have declined [facility] signup. If you would like to sign [facility], please email  Subjective   History of Present Illness    Chief Complaint: Upper abdominal pain lower chest pain  History of Present Illness: 47-year-old-year-old male presents with above complaint over the last few days.  Does have a history of HIV.  Seen and evaluated for same 2 weeks ago, was diagnosed with increased stool burden and possible proctitis.  No fever no rectal pain no painful bowel movements.  Did take a couple of doses of MiraLAX with a bowel movement with no change in his discomfort  Onset: 1 to 2 days  Duration: Persistent  Exacerbating / Alleviating factors: See above interventions  Associated symptoms: None      Nurses Notes reviewed and agree, including vitals, allergies, social history and prior medical history.     REVIEW OF SYSTEMS: All systems reviewed and not pertinent unless noted.    Positive for: Upper abdominal pain with associated lower chest tightness    Negative for: Fever cough hemoptysis syncope diarrhea GI bleeding urinary symptoms  Review of Systems    Past Medical History:   Diagnosis Date   • Acid reflux    • Allergic     Most of my life   • Anemia    • Anxiety    • Depression    • Fibromyalgia    • Fibromyalgia, primary 01/01/2017    Newly diagnosed last year   • Heart murmur    • History of medical problems    • HIV antibody positive ([facility])    • HIV disease ([facility]) 04/2102   • Hyperlipidemia        Allergies   Allergen Reactions   • Erythromycin GI Intolerance       Past Surgical History:   Procedure Laterality Date   • VASCULAR SURGERY Right 02/20/2021   • VASCULAR SURGERY Left    • WISDOM TOOTH EXTRACTION         Family History   Problem Relation Age of Onset   • Hypertension Mother    • Diabetes Mother    • Hyperlipidemia Mother    • Arthritis Mother    • Obesity Mother    • Osteoporosis Mother    • Thyroid disease Mother    • COPD Father    • Asthma Maternal Grandmother    • Stroke Paternal Grandmother    • Diabetes Paternal Grandmother        Social History     Socioeconomic History   •  "Marital status: Single   Tobacco Use   • Smoking status: Former Smoker     Packs/day: 2.00     Years: 7.00     Pack years: 14.00     Types: Cigarettes, Cigarettes     Quit date: 2017     Years since quittin.6   • Smokeless tobacco: Never Used   Vaping Use   • Vaping Use: Never used   Substance and Sexual Activity   • Alcohol use: No   • Drug use: No   • Sexual activity: Yes     Partners: Male     Birth control/protection: Condom           Objective   Physical Exam  /81   Pulse 75   Temp 98.1 °F (36.7 °C)   Resp 18   Ht 182.9 cm (72\")   Wt 83.9 kg (185 lb)   SpO2 99%   BMI 25.09 kg/m²     CONSTITUTIONAL: Well developed, nontoxic 47-year-old male,  in no acute distress.  VITAL SIGNS: per nursing, reviewed and noted  SKIN: exposed skin with no rashes, ulcerations or petechiae  EYES: Grossly EOMI, no icterus  ENT: Normal voice.  Patient maintained wearing a mask throughout patient encounter due to coronavirus pandemic  RESPIRATORY:  No increased work of breathing. No retractions.   CARDIOVASCULAR:  regular rate and rhythm, no murmurs.  Good Peripheral pulses. Good cap refill to extremities.   GI: Abdomen soft, mild epigastric tenderness palpation without rebound tenderness or guarding, normal bowel sounds. No hernia. No ascites.  MUSCULOSKELETAL:  No tenderness. Full ROM. Strength and tone grossly normal.  no spasms. no neck or back tenderness or spasm.   NEUROLOGIC: Alert, oriented x 3. No gross deficits. GCS 15.   PSYCH: appropriate affect.  : no bladder tenderness or distention, no CVA tenderness      Procedures     No attending physician procedures were performed on this patient.      ED Course  ED Course as of 22 0837   Thu Aug 11, 2022   0700 EKG interpreted by me reveals sinus rhythm rate of 89.  No ectopy no ischemic changes [PF]      ED Course User Index  [PF] Chip Tripp DO      Patient presents for evaluation of epigastric pain, patient is nontoxic afebrile with stable vitals. " [email or web address] or call [phone] to obtain an activation code.             Other Info from Your Visit           Your Appointments     Jul 05, 2017  1:00 PM CDT   Office Visit with Bro Lott PA-C   [facility] GASTROENTEROLOGY (--)    [address]  [address] [facility] [address] [phone]           Arrive 15 minutes prior to appointment.            Jul 19, 2017 10:00 AM CDT   Physical with Greta Stoddard MD   [facility] FAMILY MEDICINE (--)    [address]  [address] [facility] [address] [phone]           Arrive 15 minutes prior to appointment.              Allergies     Atropine      Sulfa Antibiotics  Itching      Reason for Visit     Follow-up           Vital Signs     Blood Pressure Pulse Oxygen Saturation Smoking Status          115/80 (BP Location: Left arm, Patient Position: Sitting, Cuff Size: Adult) 100 97% Never Smoker            Patient old records were reviewed, from last 2 ER visits.  Patient was treated with round of prednisone and Augmentin for his possible proctitis.  Differential includes gastritis/peptic ulcer disease/biliary colic/either.  Will add abdominal/cardiac labs, treat symptomatically, reassess.  Patient be signed out to oncoming physician for final disposition    Lab work is without significant or acute abnormality.  CT scan reveals findings consistent with moderate constipation and continued rectal thickening.  Given his lack of fever, rectal pain, discharge doubt proctitis at this time.  I think he needs evaluation by general surgery or GI for anoscopy.  Advised close outpatient follow-up.  He is happy with this plan.                                     [facility]    Final diagnoses:   Constipation, unspecified constipation type   Rectal abnormality          Jose G Vizcaino MD  08/11/22 [id number]

## 2022-08-13 ENCOUNTER — APPOINTMENT (OUTPATIENT)
Dept: GENERAL RADIOLOGY | Facility: HOSPITAL | Age: 47
End: 2022-08-13

## 2022-08-24 ENCOUNTER — PATIENT MESSAGE (OUTPATIENT)
Dept: INTERNAL MEDICINE | Facility: CLINIC | Age: 47
End: 2022-08-24

## 2022-08-24 DIAGNOSIS — M85.89 OSTEOPENIA OF MULTIPLE SITES: ICD-10-CM

## 2022-08-24 DIAGNOSIS — M54.2 CHRONIC NECK PAIN: ICD-10-CM

## 2022-08-24 DIAGNOSIS — G89.29 CHRONIC NECK PAIN: ICD-10-CM

## 2022-08-24 DIAGNOSIS — B00.1 FEVER BLISTER: Primary | ICD-10-CM

## 2022-08-24 DIAGNOSIS — M54.2 CERVICAL PAIN (NECK): ICD-10-CM

## 2022-08-24 RX ORDER — ACYCLOVIR 50 MG/G
1 CREAM TOPICAL EVERY 4 HOURS PRN
Qty: 30 G | Refills: 1 | Status: SHIPPED | OUTPATIENT
Start: 2022-08-24 | End: 2022-08-25

## 2022-08-24 NOTE — TELEPHONE ENCOUNTER
Amalia German LPN 8/24/2022 9:09 AM EDT    Please advise    ----- Message -----  From: Fei Ojeda  Sent: 8/24/2022 9:06 AM EDT  To: Nati Shaferid [facility] [place]  Subject: Cream     Jessica:    I am having fever blister around my mouth. Can you send some zovirax cream to my pharmacy [facility]. I only get them a few times a year and this cream really helps. Thank you.    Fei

## 2022-08-25 RX ORDER — ACYCLOVIR 50 MG/G
1 OINTMENT TOPICAL EVERY 4 HOURS PRN
Qty: 30 G | Refills: 1 | OUTPATIENT
Start: 2022-08-25 | End: 2022-09-17

## 2022-08-27 ENCOUNTER — HOSPITAL ENCOUNTER (EMERGENCY)
Facility: HOSPITAL | Age: 47
Discharge: HOME | End: 2022-08-27
Attending: EMERGENCY MEDICINE | Admitting: EMERGENCY MEDICINE

## 2022-08-27 VITALS
BODY MASS INDEX: 23.46 KG/M2 | TEMPERATURE: 98.5 F | WEIGHT: 173.2 LBS | HEART RATE: 96 BPM | HEIGHT: 72 IN | OXYGEN SATURATION: 98 % | DIASTOLIC BLOOD PRESSURE: 70 MMHG | RESPIRATION RATE: 18 BRPM | SYSTOLIC BLOOD PRESSURE: 113 MMHG

## 2022-08-27 DIAGNOSIS — H04.121 DRY EYE OF RIGHT SIDE: Primary | ICD-10-CM

## 2022-08-27 PROCEDURE — 99283 EMERGENCY DEPT VISIT LOW MDM: CPT

## 2022-08-27 RX ORDER — TETRACAINE HYDROCHLORIDE 5 MG/ML
2 SOLUTION OPHTHALMIC ONCE
Status: COMPLETED | OUTPATIENT
Start: 2022-08-27 | End: 2022-08-27

## 2022-08-27 RX ADMIN — FLUORESCEIN SODIUM 1 STRIP: 1 STRIP OPHTHALMIC at 18:21

## 2022-08-27 RX ADMIN — TETRACAINE HYDROCHLORIDE 2 DROP: 5 SOLUTION OPHTHALMIC at 18:21

## 2022-09-16 ENCOUNTER — HOSPITAL ENCOUNTER (OUTPATIENT)
Dept: GENERAL RADIOLOGY | Facility: HOSPITAL | Age: 47
Discharge: HOME | End: 2022-09-16
Admitting: NURSE PRACTITIONER

## 2022-09-16 PROCEDURE — 72050 X-RAY EXAM NECK SPINE 4/5VWS: CPT

## 2022-09-17 ENCOUNTER — APPOINTMENT (OUTPATIENT)
Dept: GENERAL RADIOLOGY | Facility: HOSPITAL | Age: 47
End: 2022-09-17

## 2022-09-26 ENCOUNTER — OFFICE VISIT (OUTPATIENT)
Dept: INTERNAL MEDICINE | Facility: CLINIC | Age: 47
End: 2022-09-26

## 2022-09-26 ENCOUNTER — LAB (OUTPATIENT)
Dept: LAB | Facility: HOSPITAL | Age: 47
End: 2022-09-26

## 2022-09-26 VITALS
DIASTOLIC BLOOD PRESSURE: 60 MMHG | SYSTOLIC BLOOD PRESSURE: 112 MMHG | HEART RATE: 69 BPM | HEIGHT: 72 IN | WEIGHT: 166 LBS | BODY MASS INDEX: 22.48 KG/M2 | OXYGEN SATURATION: 95 % | TEMPERATURE: 97.4 F

## 2022-09-26 DIAGNOSIS — E53.8 B12 DEFICIENCY: ICD-10-CM

## 2022-09-26 DIAGNOSIS — E55.9 VITAMIN D DEFICIENCY: ICD-10-CM

## 2022-09-26 DIAGNOSIS — R53.1 WEAKNESS: ICD-10-CM

## 2022-09-26 DIAGNOSIS — R20.2 PARESTHESIA: ICD-10-CM

## 2022-09-26 DIAGNOSIS — R51.9 ACUTE NONINTRACTABLE HEADACHE, UNSPECIFIED HEADACHE TYPE: Primary | ICD-10-CM

## 2022-09-26 LAB — HBA1C MFR BLD: 5.4 % (ref 4.8–5.6)

## 2022-09-26 PROCEDURE — 80053 COMPREHEN METABOLIC PANEL: CPT | Performed by: NURSE PRACTITIONER

## 2022-09-26 PROCEDURE — 83735 ASSAY OF MAGNESIUM: CPT | Performed by: NURSE PRACTITIONER

## 2022-09-26 PROCEDURE — 82607 VITAMIN B-12: CPT | Performed by: NURSE PRACTITIONER

## 2022-09-26 PROCEDURE — 85025 COMPLETE CBC W/AUTO DIFF WBC: CPT | Performed by: NURSE PRACTITIONER

## 2022-09-26 PROCEDURE — 36415 COLL VENOUS BLD VENIPUNCTURE: CPT | Performed by: NURSE PRACTITIONER

## 2022-09-26 PROCEDURE — 99214 OFFICE O/P EST MOD 30 MIN: CPT | Performed by: NURSE PRACTITIONER

## 2022-09-26 PROCEDURE — 82306 VITAMIN D 25 HYDROXY: CPT | Performed by: NURSE PRACTITIONER

## 2022-09-26 PROCEDURE — 83036 HEMOGLOBIN GLYCOSYLATED A1C: CPT | Performed by: NURSE PRACTITIONER

## 2022-09-26 NOTE — PROGRESS NOTES
"Chief Complaint   Patient presents with   • Numbness     Numbness/tingling in back of head, sometimes goes around head, down back of head and neck, accompanied by headaches, \"swimmy-headed feeling,\"  fullness in ears; requesting A1C     Subjective   Fei Ojeda is a 47 y.o. male.     History of Present Illness     Patient presents with \"funny feeling back of head, almost like bugs crawling up my scalp. It comes and goes, lasts about 15-20 seconds. Sometimes accompanies by headaches, sometimes the tingling will come before headache or right with it.\" History of migraines in high school. No rashes or pain today. He will sometimes feel lightheaded with this. Ears feel full. Requesting labs. Feels like blood sugar drops sometimes, \"I will feel lightheaded, start to sweat, and once I eat something it resolves, this happened once recently.\"     The following portions of the patient's history were reviewed and updated as appropriate: allergies, current medications, past family history, past medical history, past social history, past surgical history and problem list.    Review of Systems   Constitutional: Positive for fatigue. Negative for diaphoresis and fever.   Respiratory: Negative for shortness of breath.    Cardiovascular: Negative for chest pain and palpitations.   Gastrointestinal: Negative for abdominal pain, nausea and vomiting.   Musculoskeletal: Negative for back pain and neck pain.   Neurological: Positive for weakness, light-headedness and headaches. Negative for dizziness and syncope.   Psychiatric/Behavioral: Negative for confusion.       Objective   /60   Pulse 69   Temp 97.4 °F (36.3 °C) (Temporal)   Ht 182.9 cm (72\")   Wt 75.3 kg (166 lb)   SpO2 95%   BMI 22.51 kg/m²   Body mass index is 22.51 kg/m².  Physical Exam  Constitutional:       Appearance: Normal appearance.   HENT:      Head: Normocephalic and atraumatic.   Eyes:      Extraocular Movements: Extraocular movements intact.      " Conjunctiva/sclera: Conjunctivae normal.      Pupils: Pupils are equal, round, and reactive to light.      Funduscopic exam:     Right eye: Red reflex present.         Left eye: Red reflex present.  Cardiovascular:      Rate and Rhythm: Normal rate.   Pulmonary:      Effort: Pulmonary effort is normal.   Musculoskeletal:         General: Normal range of motion.      Cervical back: Normal range of motion.   Neurological:      General: No focal deficit present.      Mental Status: He is alert and oriented to person, place, and time.      Cranial Nerves: Cranial nerves are intact.      Sensory: Sensation is intact.      Motor: Motor function is intact.      Coordination: Coordination is intact.      Gait: Gait is intact.   Psychiatric:         Mood and Affect: Mood normal.         Behavior: Behavior normal.         Thought Content: Thought content normal.         Judgment: Judgment normal.         Assessment & Plan   Fei Ojeda is here today and the following problems have been addressed:      Diagnoses and all orders for this visit:    1. Acute nonintractable headache, unspecified headache type (Primary)  -     CBC w AUTO Differential  -     Comprehensive Metabolic Panel  -     Magnesium  -     Vitamin B12  -     Vitamin D 25 hydroxy    2. Paresthesia  -     CBC w AUTO Differential  -     Comprehensive Metabolic Panel  -     Magnesium  -     Vitamin B12  -     Vitamin D 25 hydroxy    3. Weakness  -     CBC w AUTO Differential  -     Comprehensive Metabolic Panel  -     Magnesium  -     Vitamin B12  -     Vitamin D 25 hydroxy  -     Hemoglobin A1c    4. B12 deficiency  -     CBC w AUTO Differential  -     Comprehensive Metabolic Panel  -     Magnesium    5. Vitamin D deficiency  -     Vitamin D 25 hydroxy    Differential diagnoses discussed. Lab panel ordered. Neuro exam without deficits.   Will notify lab results and update POC prn.  Keep f/u with neurology as scheduled on 10/10/22.    Follow Up   Return for Next  scheduled follow up.  Patient was given instructions and counseling regarding his condition or for health maintenance advice. Please see specific information pulled into the AVS if appropriate.     Lizzie MARADIAGA  [facility] - [place]

## 2022-09-27 LAB
25(OH)D3 SERPL-MCNC: 38.1 NG/ML (ref 30–100)
ALBUMIN SERPL-MCNC: 4.7 G/DL (ref 3.5–5.2)
ALBUMIN/GLOB SERPL: 2 G/DL
ALP SERPL-CCNC: 44 U/L (ref 39–117)
ALT SERPL W P-5'-P-CCNC: 13 U/L (ref 1–41)
ANION GAP SERPL CALCULATED.3IONS-SCNC: 10.9 MMOL/L (ref 5–15)
AST SERPL-CCNC: 18 U/L (ref 1–40)
BASOPHILS # BLD AUTO: 0.07 10*3/MM3 (ref 0–0.2)
BASOPHILS NFR BLD AUTO: 1 % (ref 0–1.5)
BILIRUB SERPL-MCNC: 0.3 MG/DL (ref 0–1.2)
BUN SERPL-MCNC: 16 MG/DL (ref 6–20)
BUN/CREAT SERPL: 16.5 (ref 7–25)
CALCIUM SPEC-SCNC: 9.7 MG/DL (ref 8.6–10.5)
CHLORIDE SERPL-SCNC: 101 MMOL/L (ref 98–107)
CO2 SERPL-SCNC: 27.1 MMOL/L (ref 22–29)
CREAT SERPL-MCNC: 0.97 MG/DL (ref 0.76–1.27)
DEPRECATED RDW RBC AUTO: 47.6 FL (ref 37–54)
EGFRCR SERPLBLD CKD-EPI 2021: 96.9 ML/MIN/1.73
EOSINOPHIL # BLD AUTO: 0.1 10*3/MM3 (ref 0–0.4)
EOSINOPHIL NFR BLD AUTO: 1.4 % (ref 0.3–6.2)
ERYTHROCYTE [DISTWIDTH] IN BLOOD BY AUTOMATED COUNT: 14.3 % (ref 12.3–15.4)
GLOBULIN UR ELPH-MCNC: 2.3 GM/DL
GLUCOSE SERPL-MCNC: 92 MG/DL (ref 65–99)
HCT VFR BLD AUTO: 41.1 % (ref 37.5–51)
HGB BLD-MCNC: 13.9 G/DL (ref 13–17.7)
IMM GRANULOCYTES # BLD AUTO: 0.01 10*3/MM3 (ref 0–0.05)
IMM GRANULOCYTES NFR BLD AUTO: 0.1 % (ref 0–0.5)
LYMPHOCYTES # BLD AUTO: 2.85 10*3/MM3 (ref 0.7–3.1)
LYMPHOCYTES NFR BLD AUTO: 40.4 % (ref 19.6–45.3)
MAGNESIUM SERPL-MCNC: 2.2 MG/DL (ref 1.6–2.6)
MCH RBC QN AUTO: 31 PG (ref 26.6–33)
MCHC RBC AUTO-ENTMCNC: 33.8 G/DL (ref 31.5–35.7)
MCV RBC AUTO: 91.7 FL (ref 79–97)
MONOCYTES # BLD AUTO: 0.85 10*3/MM3 (ref 0.1–0.9)
MONOCYTES NFR BLD AUTO: 12 % (ref 5–12)
NEUTROPHILS NFR BLD AUTO: 3.18 10*3/MM3 (ref 1.7–7)
NEUTROPHILS NFR BLD AUTO: 45.1 % (ref 42.7–76)
NRBC BLD AUTO-RTO: 0.1 /100 WBC (ref 0–0.2)
PLATELET # BLD AUTO: 283 10*3/MM3 (ref 140–450)
PMV BLD AUTO: 11.4 FL (ref 6–12)
POTASSIUM SERPL-SCNC: 4 MMOL/L (ref 3.5–5.2)
PROT SERPL-MCNC: 7 G/DL (ref 6–8.5)
RBC # BLD AUTO: 4.48 10*6/MM3 (ref 4.14–5.8)
SODIUM SERPL-SCNC: 139 MMOL/L (ref 136–145)
VIT B12 BLD-MCNC: 257 PG/ML (ref 211–946)
WBC NRBC COR # BLD: 7.06 10*3/MM3 (ref 3.4–10.8)

## 2022-09-27 NOTE — PROGRESS NOTES
Labs are normal - but B12 is the low end of normal. I would recommend an OTC Vitamin B complex supplement. Vitamin B12 being low can cause some paresthesias. Continue to follow up with neurology, I am going to wait on ordering any imaging because neurology may want a specific test/image.

## 2022-09-28 NOTE — PROGRESS NOTES
"[facility]  Cardiology Consultation Note     Fei Ojeda  1975  Requesting Provider: ASHWINI Shearer  PCP: Jessica Garcia APRN    ID:  Fei Ojeda is a 47 y.o. male who resides in [place], [place].    REASON FOR CONSULTATION:    • Palpitation         Dear Jessica Garcia:    Thank you for referring James Ojeda to my office today.  He is a 47-year-old gentleman who has intermittent palpitation symptoms.  He states that he cannot remember the last time he had palpitations but when they occur he will feel extra heartbeats in his throat.  These episodes will last approximately 30 seconds and resolve spontaneously.  There are no associated symptoms with these episodes.  No syncope or near syncope    The patient reports occasional shortness of breath.  He feels like he cannot \"take a deep breath\".  This is momentary.  He denies orthopnea PND or unexpected SHERMAN.    He is worried about his cardiovascular health because his father had palpitations and may have had a heart murmur.      Past Medical History, Past Surgical History, Family history, Social History, and Medications were all reviewed with the patient today and updated as necessary.       Current Outpatient Medications:   •  Bictegravir-Emtricitab-Tenofov (BIKTARVY) -25 MG per tablet, Take 1 tablet by mouth Daily., Disp: , Rfl:   •  calcium carbonate (OS-SHERRI) 600 MG tablet, Take 600 mg by mouth Daily., Disp: , Rfl:   •  DULoxetine (CYMBALTA) 60 MG capsule, Take 60 mg by mouth As Needed. Takes PRN with fibromyalgia flares, Disp: , Rfl:   •  glucose monitor monitoring kit, 1 each As Needed (bid). Test BID please provide what insurance covers, Disp: 1 each, Rfl: 1  •  Lancets (OneTouch Delica Plus [id number]) misc, , Disp: , Rfl:   •  mometasone (Nasonex) 50 MCG/ACT nasal spray, 2 sprays into the nostril(s) as directed by provider Daily., Disp: 17 g, Rfl: 0  •  OneTouch Ultra test strip, , Disp: , " "Rfl:     Allergies   Allergen Reactions   • Erythromycin GI Intolerance         Past Medical History:   Diagnosis Date   • Acid reflux    • Allergic     Most of my life   • Anemia    • Anxiety    • Depression    • Fibromyalgia, primary 2017    Newly diagnosed last year   • History of medical problems    • HIV disease (HCC) 2102   • Hyperlipidemia    • Osteopenia        Past Surgical History:   Procedure Laterality Date   • VASCULAR SURGERY Right 2021   • VASCULAR SURGERY Left    • WISDOM TOOTH EXTRACTION         Family History   Problem Relation Age of Onset   • Hypertension Mother    • Diabetes Mother    • Hyperlipidemia Mother    • Arthritis Mother    • Obesity Mother    • Osteoporosis Mother    • Thyroid disease Mother    • COPD Father    • Asthma Maternal Grandmother    • Stroke Paternal Grandmother    • Diabetes Paternal Grandmother        Social History     Tobacco Use   • Smoking status: Former Smoker     Packs/day: 2.00     Years: 7.00     Pack years: 14.00     Types: Cigarettes     Quit date: 2017     Years since quittin.7   • Smokeless tobacco: Never Used   Substance Use Topics   • Alcohol use: No       Review of Systems   Cardiovascular: Positive for chest pain and irregular heartbeat.   Respiratory: Negative.                /65 (BP Location: Right arm, Patient Position: Sitting, Cuff Size: Adult)   Pulse 69   Ht 182.9 cm (72\")   Wt 74.8 kg (165 lb)   SpO2 99%   BMI 22.38 kg/m²        Constitutional:       Appearance: Healthy appearance. Well-developed.   Eyes:      General: Lids are normal. No scleral icterus.     Conjunctiva/sclera: Conjunctivae normal.   HENT:      Head: Normocephalic and atraumatic.   Neck:      Thyroid: No thyromegaly.      Vascular: No carotid bruit or JVD.   Pulmonary:      Effort: Pulmonary effort is normal.      Breath sounds: Normal breath sounds.   Cardiovascular:      Normal rate. Regular rhythm.      Murmurs: There is no murmur.      No " gallop. No rub.   Pulses:     Intact distal pulses.   Abdominal:      General: There is no distension.      Palpations: Abdomen is soft. There is no abdominal mass.   Musculoskeletal:      Cervical back: Normal range of motion. Skin:     General: Skin is warm and dry.      Findings: No rash.   Neurological:      General: No focal deficit present.      Mental Status: Alert and oriented to person, place, and time.      Gait: Gait is intact.   Psychiatric:         Attention and Perception: Attention normal.         Mood and Affect: Mood normal.         Behavior: Behavior normal.             ECG 12 Lead    Date/Time: 9/30/2022 9:08 AM  Performed by: Girish Dumont IV, MD  Authorized by: Girish Dumont IV, MD   Comparison: not compared with previous ECG   Previous ECG: no previous ECG available  Rhythm: sinus rhythm  BPM: 69    Clinical impression: normal ECG            Lab Results   Component Value Date    CHOL 232 (H) 08/08/2022    HDL 71 (H) 08/08/2022    HDL 67 03/21/2022     (H) 08/08/2022    .2 (H) 03/21/2022    VLDL 27 08/08/2022     Lab Results   Component Value Date    GLUCOSE 92 09/26/2022    BUN 16 09/26/2022    CREATININE 0.97 09/26/2022    BCR 16.5 09/26/2022    K 4.0 09/26/2022    CO2 27.1 09/26/2022    CALCIUM 9.7 09/26/2022    ALBUMIN 4.70 09/26/2022    AST 18 09/26/2022    ALT 13 09/26/2022     Lab Results   Component Value Date    WBC 7.06 09/26/2022    HGB 13.9 09/26/2022    HCT 41.1 09/26/2022    MCV 91.7 09/26/2022     09/26/2022            Problem List Items Addressed This Visit        Cardiology Problems    Palpitations - Primary    Overview     · Intermittent short-lived palpitation symptoms  · Normal EKG 9/30/2022         Current Assessment & Plan     · Benign palpitation symptoms  · Obtain echocardiogram to ensure structurally normal heart  · If normal, do not recommend any medical treatment for these unless symptoms become more frequent or  bothersome  · Defer Holter monitoring unless heart not structurally normal by echo  · Discussed triggers for palpitations including stress/anxiety, caffeine and stimulant use, and dehydration         Relevant Orders    ECG 12 Lead    Adult Transthoracic Echo Complete W/ Cont if Necessary Per Protocol                   · Echo  · If normal, defer treatment or Holter monitoring unless symptoms become more frequent/bothersome  · Avoid caffeine/stimulants and maintain adequate hydration            DAVID Dumont MD, FAC, [facility]  Interventional Cardiology  09/30/22  09:20 EDT

## 2022-09-30 ENCOUNTER — OFFICE VISIT (OUTPATIENT)
Dept: CARDIOLOGY | Facility: CLINIC | Age: 47
End: 2022-09-30

## 2022-09-30 VITALS
OXYGEN SATURATION: 99 % | BODY MASS INDEX: 22.35 KG/M2 | HEART RATE: 69 BPM | DIASTOLIC BLOOD PRESSURE: 65 MMHG | WEIGHT: 165 LBS | SYSTOLIC BLOOD PRESSURE: 104 MMHG | HEIGHT: 72 IN

## 2022-09-30 DIAGNOSIS — R00.2 PALPITATIONS: Primary | ICD-10-CM

## 2022-09-30 PROBLEM — R07.89 OTHER CHEST PAIN: Status: RESOLVED | Noted: 2020-02-09 | Resolved: 2022-09-30

## 2022-09-30 PROCEDURE — 99203 OFFICE O/P NEW LOW 30 MIN: CPT | Performed by: INTERNAL MEDICINE

## 2022-09-30 PROCEDURE — 93000 ELECTROCARDIOGRAM COMPLETE: CPT | Performed by: INTERNAL MEDICINE

## 2022-09-30 RX ORDER — PHENOL 1.4 %
600 AEROSOL, SPRAY (ML) MUCOUS MEMBRANE DAILY
COMMUNITY

## 2022-09-30 NOTE — ASSESSMENT & PLAN NOTE
· Benign palpitation symptoms  · Obtain echocardiogram to ensure structurally normal heart  · If normal, do not recommend any medical treatment for these unless symptoms become more frequent or bothersome  · Defer Holter monitoring unless heart not structurally normal by echo  · Discussed triggers for palpitations including stress/anxiety, caffeine and stimulant use, and dehydration

## 2022-10-10 ENCOUNTER — OFFICE VISIT (OUTPATIENT)
Dept: NEUROLOGY | Facility: CLINIC | Age: 47
End: 2022-10-10

## 2022-10-10 VITALS
BODY MASS INDEX: 22.73 KG/M2 | TEMPERATURE: 97.5 F | SYSTOLIC BLOOD PRESSURE: 100 MMHG | DIASTOLIC BLOOD PRESSURE: 70 MMHG | WEIGHT: 167.8 LBS | HEIGHT: 72 IN

## 2022-10-10 DIAGNOSIS — R20.2 NUMBNESS AND TINGLING OF LEFT SIDE OF FACE: Primary | ICD-10-CM

## 2022-10-10 DIAGNOSIS — R20.0 NUMBNESS AND TINGLING OF BOTH UPPER EXTREMITIES: ICD-10-CM

## 2022-10-10 DIAGNOSIS — R20.0 NUMBNESS AND TINGLING OF LEFT SIDE OF FACE: Primary | ICD-10-CM

## 2022-10-10 DIAGNOSIS — R20.2 NUMBNESS AND TINGLING OF BOTH FEET: ICD-10-CM

## 2022-10-10 DIAGNOSIS — E78.49 OTHER HYPERLIPIDEMIA: ICD-10-CM

## 2022-10-10 DIAGNOSIS — R20.0 NUMBNESS AND TINGLING OF BOTH FEET: ICD-10-CM

## 2022-10-10 DIAGNOSIS — R20.2 NUMBNESS AND TINGLING OF BOTH UPPER EXTREMITIES: ICD-10-CM

## 2022-10-10 PROCEDURE — 99214 OFFICE O/P EST MOD 30 MIN: CPT | Performed by: NURSE PRACTITIONER

## 2022-10-10 NOTE — PROGRESS NOTES
"     New Patient Office Visit      Patient Name: Fei Ojeda  : 1975   MRN: [id number]     Referring Physician: Jessica Garcia, *    Chief Complaint:    Chief Complaint   Patient presents with   • Consult     NP, in office to establish care for numbness and tingling in the left side of his face. Patient states he feels like his head is \"full\".       History of Present Illness: Fei jOeda is a 47 y.o. male who is here today to establish care with Neurology.  He complains of numbness and tingling to the left side of his face (comes and goes) and a strange feeling inside his head- symptoms began a couple of months ago.  Feels like he initially woke up with the symptoms one morning and went to the ED because he thought he could be having a stroke.  When he lived in [place], and went to the ED and had a CT and was told he could have trigeminal neuralgia.  He describes the strange feeling in his head as feeling as if his head is floating and a \"swimmy feeling\"- there is no dizziness present.  He gets sharp pains up the back part of the head- lasting for a few seconds and then going away.  He has been treated with steroids in the past and they did help.  He does mention he saw ENT previously; Dr. Fuchs around  for similar symptoms.  He does occassionally have numbness and tingling in his fingers and toes.  He does say he has anxiety and has had a lot of social stressors recently- long time partner passed away suddenly and mother was just entered a nursing home.  He denies any history of chronic alcohol use.  Additional risk factors- HIV (on therapy since ), anxiety disorder, PTSD, varicose veins, fibromyalgia, hyperlipidemia.     Pertinent Medical History:  On 2022 Vitamin B12, CBC with differential, CMP and HgbA1c were all noncontributory.  CT head without contrast on 2022 was normal.      Subjective      Review of Systems:   Review of Systems   Constitutional: Negative for " fatigue, fever, unexpected weight gain and unexpected weight loss.   HENT: Negative for hearing loss, sore throat, swollen glands, tinnitus and trouble swallowing.    Eyes: Negative for blurred vision, double vision, photophobia and visual disturbance.   Respiratory: Negative for cough, chest tightness and shortness of breath.    Cardiovascular: Negative for chest pain, palpitations and leg swelling.   Gastrointestinal: Negative for constipation, diarrhea and nausea.   Endocrine: Negative for cold intolerance and heat intolerance.   Musculoskeletal: Negative for gait problem, neck pain and neck stiffness.   Skin: Negative for color change and rash.   Allergic/Immunologic: Negative for environmental allergies and food allergies.   Neurological: Positive for numbness. Negative for dizziness, syncope, facial asymmetry, speech difficulty, weakness, headache, memory problem and confusion.   Psychiatric/Behavioral: Negative for agitation, behavioral problems and depressed mood. The patient is not nervous/anxious.        Past Medical History:   Past Medical History:   Diagnosis Date   • Acid reflux    • Allergic     Most of my life   • Anemia    • Anxiety    • Depression    • Fibromyalgia, primary 01/01/2017    Newly diagnosed last year   • History of medical problems    • HIV disease ([facility]) 04/2102   • Hyperlipidemia    • Numbness and tingling    • Osteopenia    • Trigeminal neuralgia        Past Surgical History:   Past Surgical History:   Procedure Laterality Date   • VASCULAR SURGERY Right 02/20/2021   • VASCULAR SURGERY Left    • WISDOM TOOTH EXTRACTION         Family History:   Family History   Problem Relation Age of Onset   • Hypertension Mother    • Diabetes Mother    • Hyperlipidemia Mother    • Arthritis Mother    • Obesity Mother    • Osteoporosis Mother    • Thyroid disease Mother    • COPD Father    • Asthma Maternal Grandmother    • Stroke Paternal Grandmother    • Diabetes Paternal Grandmother        Social  "History:   Social History     Socioeconomic History   • Marital status: Single   Tobacco Use   • Smoking status: Former     Packs/day: 2.00     Years: 7.00     Pack years: 14.00     Types: Cigarettes     Quit date: 2017     Years since quittin.7   • Smokeless tobacco: Never   Vaping Use   • Vaping Use: Never used   Substance and Sexual Activity   • Alcohol use: No   • Drug use: No   • Sexual activity: Yes     Partners: Male     Birth control/protection: Condom       Medications:     Current Outpatient Medications:   •  Bictegravir-Emtricitab-Tenofov (BIKTARVY) -25 MG per tablet, Take 1 tablet by mouth Daily., Disp: , Rfl:   •  calcium carbonate (OS-SHERRI) 600 MG tablet, Take 600 mg by mouth Daily., Disp: , Rfl:   •  DULoxetine (CYMBALTA) 60 MG capsule, Take 60 mg by mouth As Needed. Takes PRN with fibromyalgia flares, Disp: , Rfl:   •  glucose monitor monitoring kit, 1 each As Needed (bid). Test BID please provide what insurance covers, Disp: 1 each, Rfl: 1  •  Lancets (OneTouch Delica Plus [id number]) misc, , Disp: , Rfl:   •  mometasone (Nasonex) 50 MCG/ACT nasal spray, 2 sprays into the nostril(s) as directed by provider Daily., Disp: 17 g, Rfl: 0  •  OneTouch Ultra test strip, , Disp: , Rfl:     Allergies:   Allergies   Allergen Reactions   • Erythromycin GI Intolerance       Objective     Physical Exam:  Vital Signs:   Vitals:    10/10/22 0856   BP: 100/70   BP Location: Left arm   Patient Position: Sitting   Cuff Size: Adult   Pulse: Comment: unable to obtain   Temp: 97.5 °F (36.4 °C)   Weight: 76.1 kg (167 lb 12.8 oz)   Height: 182.9 cm (72\")   PainSc:   6   PainLoc: Face     Body mass index is 22.76 kg/m².     Physical Exam  Vitals and nursing note reviewed.   Constitutional:       General: He is not in acute distress.     Appearance: Normal appearance. He is well-developed and normal weight. He is not diaphoretic.   HENT:      Head: Normocephalic and atraumatic.   Eyes:      Extraocular " Movements: Extraocular movements intact.      Conjunctiva/sclera: Conjunctivae normal.      Pupils: Pupils are equal, round, and reactive to light.   Cardiovascular:      Rate and Rhythm: Normal rate and regular rhythm.      Heart sounds: Normal heart sounds. No murmur heard.    No friction rub. No gallop.   Pulmonary:      Effort: Pulmonary effort is normal. No respiratory distress.      Breath sounds: Normal breath sounds. No wheezing or rales.   Musculoskeletal:         General: Normal range of motion.   Skin:     General: Skin is warm and dry.      Findings: No rash.   Neurological:      General: No focal deficit present.      Mental Status: He is alert and oriented to person, place, and time.      Cranial Nerves: Cranial nerves 2-12 are intact.      Coordination: Finger-Nose-Finger Test and Romberg Test normal.      Gait: Gait is intact. Tandem walk normal.   Psychiatric:         Mood and Affect: Mood normal.         Speech: Speech normal.         Behavior: Behavior normal.         Thought Content: Thought content normal.         Judgment: Judgment normal.         Neurologic Exam     Mental Status   Oriented to person, place, and time.   Attention: normal. Concentration: normal.   Speech: speech is normal   Level of consciousness: alert  Knowledge: good.     Cranial Nerves   Cranial nerves II through XII intact.     CN II   Visual fields full to confrontation.     CN III, IV, VI   Pupils are equal, round, and reactive to light.  Right pupil: Size: 3 mm. Shape: regular. Reactivity: brisk.   Left pupil: Size: 3 mm. Shape: regular. Reactivity: brisk.   CN III: no CN III palsy  CN VI: no CN VI palsy  Nystagmus: none     CN V   Facial sensation intact.     CN VII   Facial expression full, symmetric.     CN VIII   CN VIII normal.     CN IX, X   CN IX normal.   CN X normal.     CN XI   CN XI normal.     CN XII   CN XII normal.     Motor Exam   Muscle bulk: normal  Overall muscle tone: normal    Strength   Right  biceps: 5/5  Left biceps: 5/5  Right triceps: 5/5  Left triceps: 5/5  Right quadriceps: 5/5  Left quadriceps: 5/5  Right hamstrin/5  Left hamstrin/5    Sensory Exam   Light touch normal.   Proprioception normal.     Gait, Coordination, and Reflexes     Gait  Gait: normal    Coordination   Romberg: negative  Finger to nose coordination: normal  Tandem walking coordination: normal    Tremor   Resting tremor: absent  Intention tremor: absent  Action tremor: absent    Reflexes   Reflexes 2+ except as noted.       Assessment / Plan      Assessment/Plan:   Diagnoses and all orders for this visit:    1. Numbness and tingling of left side of face (Primary)  -     MRI Brain With & Without Contrast; Future    2. Other hyperlipidemia  -     MRI Brain With & Without Contrast; Future    3. Numbness and tingling of both feet  -     MRI Brain With & Without Contrast; Future    4. Numbness and tingling of both upper extremities  -     MRI Brain With & Without Contrast; Future    5. BMI 22.0-22.9, adult    *Patient education on Peripheral neuropathy,Trigeminal neuralgia, and Anxiety disorder provided today. His antiviral medications can cause peripheral neuropathy. May consider NCV/EMG at follow up appointment.     Follow Up:   Return in about 2 months (around 12/10/2022) for Follow Up.    ASHWINI Gutierrez, FNP-C  [facility]       Please note that portions of this note may have been completed with a voice recognition program. Efforts were made to edit the dictations, but occasionally words are mistranscribed.

## 2022-10-17 ENCOUNTER — PATIENT ROUNDING (BHMG ONLY) (OUTPATIENT)
Dept: NEUROLOGY | Facility: CLINIC | Age: 47
End: 2022-10-17

## 2022-10-26 ENCOUNTER — TELEPHONE (OUTPATIENT)
Dept: NEUROLOGY | Facility: CLINIC | Age: 47
End: 2022-10-26

## 2022-10-26 NOTE — TELEPHONE ENCOUNTER
PT CALLED TO [facility] ON MRI P.A CALLED OFFICE TOM SAID THEY WILL CALL PT BACK TO LET HIM KNOW IF THAT WAS DONE. HE WAS TOLD IT WAS NOT. PT HUNG UP WHILE ON HOLD

## 2022-10-26 NOTE — TELEPHONE ENCOUNTER
THIS IS SCHEDULED AT [facility]. OUR [place] DEPT WORKS ON THE AUTH CLOSER TO TIME FOR THE APPT. SO THEY WILL WORK ON THIS THE MIDDLE OF November SINCE HE IS NOT SCHEDULED UNTIL THE END OF November. CALLED PT AND LEFT MESSAGE OF ABOVE INFORMATION.

## 2022-10-28 ENCOUNTER — TELEPHONE (OUTPATIENT)
Dept: INTERNAL MEDICINE | Facility: CLINIC | Age: 47
End: 2022-10-28

## 2022-10-28 NOTE — TELEPHONE ENCOUNTER
"Attempted to reschedule patient's appointment due to Lizzie's absence.  Patient states that he has had ringing in his ears for over a week and he \"has to see someone this afternoon at 5:30!\" and that \"this is not the first time this has happened.\"   As I was searching the schedule, the patient stated that he was \"on a short break at work,\" and \"I don't mean to sparks you but we need to step it up!\"  I apologized and told him that I am working as fast as I can. There is no provider availability this afternoon.  Patient asked about an appointment tomorrow.  Upon investigation, there are no appointments available tomorrow either, so I suggested that he call at 8:00 am to try to get a same day appointment.   He states that he just started a new job and he has to work 8-4 and that's why he scheduled his appointment the way he did. Patient states that he can't take anymore time off work.  I apologized for the frustration that this has caused. Patient wants a supervisor to call him this afternoon between 12:30-1:00.  I offered to let him speak to a supervisor at the time of the call and he refused, stating that he is on a break at work and doesn't have time right now.  "

## 2022-10-28 NOTE — TELEPHONE ENCOUNTER
Attempted to call patient but was only able to leave a voicemail as the patient did not answer.   Left voicemail for patient to return my call.

## 2022-10-31 NOTE — TELEPHONE ENCOUNTER
Spoke to patient on Friday, 11/28/2022 and made him an appointment for 11/9/2022 per his choice. I was unaware he already has an appointment scheduled with Lauren Petersen on 11/04/2022. I also advised him that he could call at 8am in the morning to see if he can be scheduled in the one same day spot.   The patient was not cooperative in trying to find an available appointment before 4 pm which I explained makes it difficult in trying to get him into the office soon. He complained when he talked to Ina earlier he wasn't able to get an appointment, he wasn't offered any appointments in a timely manner. I reiterated to him that with his requirement to be seen in office after 4 pm she wasn't able to offer him an appointment that he would accept. I explained to him that I heard her on the phone and I witnessed her offering several appointments that he did not accept due to being earlier in the day.  I also advised him Urgent Care was also an option where he could be seen later in the day.

## 2022-10-31 NOTE — TELEPHONE ENCOUNTER
Called patient as he requested on Saturday when he wasn't able to get the Saturday same day. Left message on voicemail as patient didn't answer.

## 2022-11-04 ENCOUNTER — HOSPITAL ENCOUNTER (OUTPATIENT)
Dept: CARDIOLOGY | Facility: HOSPITAL | Age: 47
Discharge: HOME | End: 2022-11-04
Admitting: INTERNAL MEDICINE

## 2022-11-04 ENCOUNTER — TREATMENT (OUTPATIENT)
Dept: PHYSICAL THERAPY | Facility: CLINIC | Age: 47
End: 2022-11-04

## 2022-11-04 ENCOUNTER — OFFICE VISIT (OUTPATIENT)
Dept: INTERNAL MEDICINE | Facility: CLINIC | Age: 47
End: 2022-11-04

## 2022-11-04 ENCOUNTER — TELEPHONE (OUTPATIENT)
Dept: NEUROLOGY | Facility: CLINIC | Age: 47
End: 2022-11-04

## 2022-11-04 VITALS — HEIGHT: 72 IN | WEIGHT: 167.77 LBS | BODY MASS INDEX: 22.72 KG/M2

## 2022-11-04 VITALS
HEART RATE: 86 BPM | BODY MASS INDEX: 22.62 KG/M2 | TEMPERATURE: 97.7 F | OXYGEN SATURATION: 98 % | DIASTOLIC BLOOD PRESSURE: 74 MMHG | WEIGHT: 167 LBS | HEIGHT: 72 IN | SYSTOLIC BLOOD PRESSURE: 117 MMHG

## 2022-11-04 DIAGNOSIS — J30.9 CHRONIC ALLERGIC RHINITIS: ICD-10-CM

## 2022-11-04 DIAGNOSIS — H69.83 DYSFUNCTION OF BOTH EUSTACHIAN TUBES: Primary | ICD-10-CM

## 2022-11-04 DIAGNOSIS — H93.13 TINNITUS OF BOTH EARS: ICD-10-CM

## 2022-11-04 DIAGNOSIS — M54.2 PAIN, NECK: Primary | ICD-10-CM

## 2022-11-04 DIAGNOSIS — R00.2 PALPITATIONS: ICD-10-CM

## 2022-11-04 PROCEDURE — 93306 TTE W/DOPPLER COMPLETE: CPT | Performed by: INTERNAL MEDICINE

## 2022-11-04 PROCEDURE — 99213 OFFICE O/P EST LOW 20 MIN: CPT | Performed by: NURSE PRACTITIONER

## 2022-11-04 PROCEDURE — 93306 TTE W/DOPPLER COMPLETE: CPT

## 2022-11-04 PROCEDURE — 97161 PT EVAL LOW COMPLEX 20 MIN: CPT | Performed by: PHYSICAL THERAPIST

## 2022-11-04 RX ORDER — ACYCLOVIR 50 MG/G
OINTMENT TOPICAL
COMMUNITY

## 2022-11-04 NOTE — PROGRESS NOTES
Physical Therapy Initial Evaluation and Plan of Care      Patient: Fei Ojeda   : 1975  Diagnosis/ICD-10 Code:  Pain, neck [M54.2]  Referring practitioner: CHRISTA Shearer    Subjective Evaluation    History of Present Illness  Date of onset: 2021  Mechanism of injury: Pt reports he has developed neck pain over the last year. Pt states that nothing he can point to that started the neck pain. Pt reports pain is worse with laying poorly, driving, and working on his computer. Pt reports hearing grinding and having throbbing neck pain with movement. Pt reports that he has headaches but does not know if they are related. Pt reports HAs 1-2 times per week.       Patient Occupation: State worker desk work          Objective          Palpation   Left   No palpable tenderness to the upper trapezius.   Tenderness of the levator scapulae.     Right   No palpable tenderness to the upper trapezius. Tenderness of the levator scapulae.     Tenderness   Cervical Spine   Tenderness in the spinous process.   No tenderness in the left transverse process and right transverse process.     Additional Tenderness Details  C3-4 very tender     Neurological Testing     Reflexes   Left   Biceps (C5/C6): normal (2+)  Brachioradialis (C6): normal (2+)  Triceps (C7): normal (2+)    Right   Biceps (C5/C6): normal (2+)  Brachioradialis (C6): normal (2+)  Triceps (C7): normal (2+)    Active Range of Motion   Cervical/Thoracic Spine   Cervical    Flexion: 55 degrees   Extension: 40 degrees   Left lateral flexion: 30 degrees   Right lateral flexion: 30 degrees   Left rotation: 55 degrees   Right rotation: 65 degrees           Assessment & Plan     Assessment  Impairments: abnormal or restricted ROM, activity intolerance, lacks appropriate home exercise program and pain with function  Functional Limitations: uncomfortable because of pain  Assessment details: Patient is a 47 year old male who comes to physical therapy with  history of HIV due to chronic, insidious onset of neck pain. Signs and symptoms are consistent with neck pain with mobility deficits due to poor posture and work ergonomics resulting in pain, decreased ROM, decreased strength, and inability to perform all essential functional activities. Pt will benefit from skilled PT services to address the above issues.     Prognosis: good    Goals  Plan Goals: SHORT TERM GOALS:     2 weeks  1. Pt independent with HEP  2. Pt to demonstrate cervical PROM % of expected norms to allow for improved ability to perform ADL's  3. Pt to report not having any headaches related to neck pain for the past 3 days    LONG TERM GOALS:   6 weeks  1. Pt to demonstrate cervical AROM % of expected norms to allow for improved safety when driving  2. Pt to demonstrate ability to lift 10# OH with bilateral arm(s) without increase in pain in the neck   3. Pt to report being able to work full shift or work in the home without increase in pain in the neck      Plan  Therapy options: will be seen for skilled therapy services  Planned modality interventions: cryotherapy and dry needling  Planned therapy interventions: body mechanics training, flexibility, functional ROM exercises, home exercise program, joint mobilization, manual therapy, motor coordination training, neuromuscular re-education, postural training, spinal/joint mobilization, soft tissue mobilization, strengthening and therapeutic activities  Plan details: Pt is going to stay with some elderly family and will contact clinic to follow up. Pt may be gone 3-4 weeks before rescheduling. Pt provided with [facility]        Manual Therapy:         mins  79169;  Therapeutic Exercise:         mins  66046;     Neuromuscular Meliza:        mins  58242;    Therapeutic Activity:          mins  30348;     Gait Training:           mins  95871;     Ultrasound:          mins  41166;    Electrical Stimulation:         mins  42484 ( );  Dry Needling           mins self-pay    Timed Treatment:      mins   Total Treatment:     40   mins    PT SIGNATURE: Harlan Montes, PT   [place] License: [id number]  DATE TREATMENT INITIATED: 11/4/2022    Initial Certification  Certification Period: 2/1/2023  I certify that the therapy services are furnished while this patient is under my care.  The services outlined above are required by this patient, and will be reviewed every 90 days.     PHYSICIAN: Jessica Garcia, ASHWINI      DATE:     Please sign and return via fax to [phone].. Thank you, [facility].

## 2022-11-04 NOTE — PROGRESS NOTES
"  Office Visit      Patient Name: Fei Ojeda  : 1975   MRN: [id number]   Care Team: Patient Care Team:  Jessica Garcia APRN as PCP - General (Family Medicine)    Chief Complaint  Tinnitus (Ringing in both ears, treated with a round of antibiotics and steroids. Feel a fullness in both ears) and Headache (Cluster headaches)    Subjective     Subjective      Fei Ojeda presents to [facility] for tinnitus.   Symptoms started about 8 days ago.   He is having chronic issues with his ears and currently seeing ENT.   Endorses pressure in bilateral ears, tinnitus in bilateral ears, headaches.   Under more stress lately, just recently had her mother in a nursing home.  He is using nasonex and ENT called in [facility] today.  [facility] says his ears are full of fluid but no infection.  Denies loss of hearing, vision changes, or dizziness.  Also established with neurology, MRI of the brain has been ordered, not yet performed.  Objective     Objective   Vital Signs:   /74   Pulse 86   Temp 97.7 °F (36.5 °C)   Ht 182.9 cm (72\")   Wt 75.8 kg (167 lb)   SpO2 98%   BMI 22.65 kg/m²     Physical Exam  Vitals and nursing note reviewed.   Constitutional:       General: He is not in acute distress.     Appearance: Normal appearance. He is not ill-appearing or toxic-appearing.   HENT:      Right Ear: Ear canal normal. A middle ear effusion is present. Tympanic membrane is not bulging.      Left Ear: Ear canal normal. A middle ear effusion is present. Tympanic membrane is bulging.      Nose: Nose normal. No congestion or rhinorrhea.      Right Sinus: No maxillary sinus tenderness or frontal sinus tenderness.      Left Sinus: No maxillary sinus tenderness or frontal sinus tenderness.      Mouth/Throat:      Mouth: Mucous membranes are moist.      Pharynx: No posterior oropharyngeal erythema.   Eyes:      Pupils: Pupils are equal, round, and reactive to light.   Neck:      Vascular: " No carotid bruit.   Cardiovascular:      Rate and Rhythm: Normal rate and regular rhythm.      Heart sounds: Normal heart sounds. No murmur heard.  Pulmonary:      Effort: Pulmonary effort is normal. No respiratory distress.      Breath sounds: Normal breath sounds. No wheezing.   Abdominal:      General: Bowel sounds are normal. There is no distension.      Palpations: Abdomen is soft.      Tenderness: There is no abdominal tenderness.   Musculoskeletal:      Cervical back: Neck supple.   Lymphadenopathy:      Head:      Right side of head: No submental, submandibular or tonsillar adenopathy.      Left side of head: No submental, submandibular or tonsillar adenopathy.   Skin:     General: Skin is warm and dry.      Findings: No rash.   Neurological:      Mental Status: He is alert.   Psychiatric:         Mood and Affect: Mood normal.         Behavior: Behavior normal.          Assessment / Plan      Assessment & Plan   Problem List Items Addressed This Visit        Allergies and Adverse Reactions    Chronic allergic rhinitis       ENT    Dysfunction of both eustachian tubes - Primary    Overview             Other Visit Diagnoses     Tinnitus of both ears        Discussed etiology of tinnitus, does not appear to have a vascular cause.  Proceed with MRI of the brain.  Recommend using a sound machine or other type of external stimuli to decrease intensity.  Continue fluticasone and Astelin nasal spray.  Sudafed as needed.  Discussed he would not benefit from antibiotics for more steroids at this time.  Needs to follow-up with ENT.  Information given in his after visit summary regarding tinnitus and eustachian tube dysfunction, also recommended diver maneuvers.  Follow-up as needed.         Follow Up   Return if symptoms worsen or fail to improve.  Patient was given instructions and counseling regarding his condition or for health maintenance advice. Please see specific information pulled into the AVS if appropriate.      ASHWINI Mendieta  [facility]      Answers for HPI/ROS submitted by the patient on 10/29/2022  What is the primary reason for your visit?: Ear Pain  Affected ear: both  Chronicity: recurrent  Onset: in the past 7 days  Progression since onset: gradually worsening  Frequency: constantly  Fever: no fever  Pain - numeric: 9/10  headaches: Yes

## 2022-11-04 NOTE — TELEPHONE ENCOUNTER
Provider: PAOLA  Caller: PATIENT  Relationship to Patient: SELF    Reason for Call:PATIENT WOULD LIKE VISIT ON 12/12/22 CONVERTED TO TELEHEALTH. HE STATES DR GUEVARA APPROVED THIS REQUEST.     PLEASE CONVERT  THANK YOU

## 2022-11-07 LAB
BH CV ECHO MEAS - AO MAX PG: 4.1 MMHG
BH CV ECHO MEAS - AO MEAN PG: 2 MMHG
BH CV ECHO MEAS - AO ROOT DIAM: 3.1 CM
BH CV ECHO MEAS - AO V2 MAX: 101.1 CM/SEC
BH CV ECHO MEAS - AO V2 VTI: 20.3 CM
BH CV ECHO MEAS - AVA(I,D): 2.9 CM2
BH CV ECHO MEAS - EDV(CUBED): 52.7 ML
BH CV ECHO MEAS - EDV(MOD-SP2): 84 ML
BH CV ECHO MEAS - EDV(MOD-SP4): 110 ML
BH CV ECHO MEAS - EF(MOD-BP): 56 %
BH CV ECHO MEAS - EF(MOD-SP2): 59.5 %
BH CV ECHO MEAS - EF(MOD-SP4): 54.5 %
BH CV ECHO MEAS - ESV(CUBED): 14.3 ML
BH CV ECHO MEAS - ESV(MOD-SP2): 34 ML
BH CV ECHO MEAS - ESV(MOD-SP4): 50 ML
BH CV ECHO MEAS - FS: 35.2 %
BH CV ECHO MEAS - IVS/LVPW: 0.9 CM
BH CV ECHO MEAS - IVSD: 0.79 CM
BH CV ECHO MEAS - LA DIMENSION: 3.4 CM
BH CV ECHO MEAS - LAT PEAK E' VEL: 12.5 CM/SEC
BH CV ECHO MEAS - LV DIASTOLIC VOL/BSA (35-75): 55.7 CM2
BH CV ECHO MEAS - LV MASS(C)D: 88.2 GRAMS
BH CV ECHO MEAS - LV MAX PG: 3.1 MMHG
BH CV ECHO MEAS - LV MEAN PG: 2 MMHG
BH CV ECHO MEAS - LV SYSTOLIC VOL/BSA (12-30): 25.3 CM2
BH CV ECHO MEAS - LV V1 MAX: 88.1 CM/SEC
BH CV ECHO MEAS - LV V1 VTI: 18.9 CM
BH CV ECHO MEAS - LVIDD: 3.8 CM
BH CV ECHO MEAS - LVIDS: 2.43 CM
BH CV ECHO MEAS - LVOT AREA: 3.1 CM2
BH CV ECHO MEAS - LVOT DIAM: 2 CM
BH CV ECHO MEAS - LVPWD: 0.87 CM
BH CV ECHO MEAS - MED PEAK E' VEL: 9.4 CM/SEC
BH CV ECHO MEAS - MV A MAX VEL: 57.5 CM/SEC
BH CV ECHO MEAS - MV DEC SLOPE: 285 CM/SEC2
BH CV ECHO MEAS - MV DEC TIME: 0.22 MSEC
BH CV ECHO MEAS - MV E MAX VEL: 69.8 CM/SEC
BH CV ECHO MEAS - MV E/A: 1.21
BH CV ECHO MEAS - MV MAX PG: 2.7 MMHG
BH CV ECHO MEAS - MV MEAN PG: 1 MMHG
BH CV ECHO MEAS - MV P1/2T: 80.4 MSEC
BH CV ECHO MEAS - MV V2 VTI: 25.8 CM
BH CV ECHO MEAS - MVA(P1/2T): 2.7 CM2
BH CV ECHO MEAS - MVA(VTI): 2.3 CM2
BH CV ECHO MEAS - PA ACC SLOPE: 626 CM/SEC2
BH CV ECHO MEAS - PA ACC TIME: 0.12 SEC
BH CV ECHO MEAS - PA PR(ACCEL): 24.6 MMHG
BH CV ECHO MEAS - PA V2 MAX: 83.6 CM/SEC
BH CV ECHO MEAS - RAP SYSTOLE: 8 MMHG
BH CV ECHO MEAS - RVSP: 28 MMHG
BH CV ECHO MEAS - SI(MOD-SP2): 25.3 ML/M2
BH CV ECHO MEAS - SI(MOD-SP4): 30.4 ML/M2
BH CV ECHO MEAS - SV(LVOT): 59.4 ML
BH CV ECHO MEAS - SV(MOD-SP2): 50 ML
BH CV ECHO MEAS - SV(MOD-SP4): 60 ML
BH CV ECHO MEAS - TAPSE (>1.6): 2.2 CM
BH CV ECHO MEAS - TR MAX PG: 19.5 MMHG
BH CV ECHO MEAS - TR MAX VEL: 221 CM/SEC
BH CV ECHO MEASUREMENTS AVERAGE E/E' RATIO: 6.37
BH CV VAS BP LEFT ARM: NORMAL MMHG
BH CV XLRA - RV BASE: 3.8 CM
BH CV XLRA - RV LENGTH: 6.5 CM
BH CV XLRA - RV MID: 3 CM
BH CV XLRA - TDI S': 12.9 CM/SEC
LEFT ATRIUM VOLUME INDEX: 16.2 ML/M2
LV EF 2D ECHO EST: 60 %
MAXIMAL PREDICTED HEART RATE: 173 BPM
STRESS TARGET HR: 147 BPM

## 2022-11-07 NOTE — TELEPHONE ENCOUNTER
Attempted to contact patient no answer when calling.     Changed patients appointment to telephone visit.     Okay for hub staff to relay message.

## 2022-11-28 ENCOUNTER — HOSPITAL ENCOUNTER (OUTPATIENT)
Dept: MRI IMAGING | Facility: HOSPITAL | Age: 47
Discharge: HOME | End: 2022-11-28

## 2022-11-28 DIAGNOSIS — R20.0 NUMBNESS AND TINGLING OF BOTH FEET: ICD-10-CM

## 2022-11-28 DIAGNOSIS — R20.2 NUMBNESS AND TINGLING OF LEFT SIDE OF FACE: ICD-10-CM

## 2022-11-28 DIAGNOSIS — E78.49 OTHER HYPERLIPIDEMIA: ICD-10-CM

## 2022-11-28 DIAGNOSIS — R20.2 NUMBNESS AND TINGLING OF BOTH UPPER EXTREMITIES: ICD-10-CM

## 2022-11-28 DIAGNOSIS — R20.2 NUMBNESS AND TINGLING OF BOTH FEET: ICD-10-CM

## 2022-11-28 DIAGNOSIS — R20.0 NUMBNESS AND TINGLING OF LEFT SIDE OF FACE: ICD-10-CM

## 2022-11-28 DIAGNOSIS — R20.0 NUMBNESS AND TINGLING OF BOTH UPPER EXTREMITIES: ICD-10-CM

## 2022-11-29 ENCOUNTER — HOSPITAL ENCOUNTER (OUTPATIENT)
Dept: MRI IMAGING | Facility: HOSPITAL | Age: 47
Discharge: HOME | End: 2022-11-29
Admitting: NURSE PRACTITIONER

## 2022-11-29 PROCEDURE — A9577 INJ MULTIHANCE: HCPCS | Performed by: NURSE PRACTITIONER

## 2022-11-29 PROCEDURE — 0 GADOBENATE DIMEGLUMINE 529 MG/ML SOLUTION: Performed by: NURSE PRACTITIONER

## 2022-11-29 PROCEDURE — 70553 MRI BRAIN STEM W/O & W/DYE: CPT

## 2022-11-29 RX ADMIN — GADOBENATE DIMEGLUMINE 15 ML: 529 INJECTION, SOLUTION INTRAVENOUS at 19:46

## 2022-12-02 ENCOUNTER — TELEPHONE (OUTPATIENT)
Dept: NEUROLOGY | Facility: CLINIC | Age: 47
End: 2022-12-02

## 2022-12-02 NOTE — TELEPHONE ENCOUNTER
Caller: Fei Ojeda    Relationship: Self    Best call back number: [phone]      What test was performed: MRI    When was the test performed: 11/29/22    Where was the test performed: [facility]    Additional notes: PATIENT WOULD LIKE RESULTS FROM HIS MRI. MY CHART SHOWS HE HAS NEW TEST RESULTS BUT THEY ARE NOT ACTUALLY IN THERE.

## 2022-12-05 NOTE — TELEPHONE ENCOUNTER
His MRI brain shows chronic changes within the brain but no acute intracranial abnormality. So, that is good news.

## 2022-12-09 ENCOUNTER — OFFICE VISIT (OUTPATIENT)
Dept: INTERNAL MEDICINE | Facility: CLINIC | Age: 47
End: 2022-12-09

## 2022-12-09 VITALS
OXYGEN SATURATION: 99 % | TEMPERATURE: 97.1 F | WEIGHT: 177 LBS | HEIGHT: 72 IN | HEART RATE: 72 BPM | BODY MASS INDEX: 23.98 KG/M2 | SYSTOLIC BLOOD PRESSURE: 118 MMHG | DIASTOLIC BLOOD PRESSURE: 68 MMHG

## 2022-12-09 DIAGNOSIS — U09.9 POST-COVID SYNDROME: ICD-10-CM

## 2022-12-09 DIAGNOSIS — R05.3 CHRONIC COUGH: ICD-10-CM

## 2022-12-09 DIAGNOSIS — R53.1 WEAKNESS GENERALIZED: Primary | ICD-10-CM

## 2022-12-09 DIAGNOSIS — E55.9 VITAMIN D DEFICIENCY: ICD-10-CM

## 2022-12-09 DIAGNOSIS — R53.82 CHRONIC FATIGUE: ICD-10-CM

## 2022-12-09 PROCEDURE — 99213 OFFICE O/P EST LOW 20 MIN: CPT | Performed by: NURSE PRACTITIONER

## 2022-12-09 NOTE — PROGRESS NOTES
"  Office Visit      Patient Name: Fei Ojeda  : 1975   MRN: [id number]   Care Team: Patient Care Team:  Jessica Garcia APRN as PCP - General (Family Medicine)    Chief Complaint  Weakness - Generalized (Ringing in ears /Memory foggy)    Subjective     Subjective      Fei Ojeda presents to [facility] for generalized weakness and memory problem.   Saw ENT and was told he had hearing loss, not bad enough for tubes or hearing aids. Was told it was reassuring tinnitus comes and goes. Symptoms remain the same. He has had full work up by both neurology and ENT for the problem, including MRI.    He is complaining of weakness and memory problem since having COVID. He does have a history of fibromyalgia. He does not snore or stop breathing in his sleep. He did have a sleep study was difficult to sleep due to room temperature. He was not offered an at home test, interested in possibly repeating this in the future.   Endorse chronic joint pain, foggy memory, generalized weakness bilaterally, and swelling of his small joints at times.   Denies skin rashes, history of autoimmune disorder, recent falls, and difficulty with ambulation.   Previous PCP ordered a sputum sample about 2 years ago, this was not able to be processed, he is hoping to get this repeated due to his chronic cough. He does suffer from HIV which is managed by ID at  with biktarvy.     Objective     Objective   Vital Signs:   /68   Pulse 72   Temp 97.1 °F (36.2 °C)   Ht 182.9 cm (72\")   Wt 80.3 kg (177 lb)   SpO2 99%   BMI 24.01 kg/m²     Physical Exam  Vitals and nursing note reviewed.   Constitutional:       General: He is not in acute distress.     Appearance: Normal appearance. He is not ill-appearing or toxic-appearing.   Eyes:      Pupils: Pupils are equal, round, and reactive to light.   Neck:      Vascular: No carotid bruit.   Cardiovascular:      Rate and Rhythm: Normal rate and " regular rhythm.      Heart sounds: Normal heart sounds. No murmur heard.  Pulmonary:      Effort: Pulmonary effort is normal. No respiratory distress.      Breath sounds: Normal breath sounds. No wheezing, rhonchi or rales.   Abdominal:      General: Bowel sounds are normal. There is no distension.      Palpations: Abdomen is soft.      Tenderness: There is no abdominal tenderness.   Musculoskeletal:         General: Deformity present. Normal range of motion.      Cervical back: Normal range of motion and neck supple. No tenderness.      Comments: No swelling of joints or erythema noted at today's visit.      Skin:     General: Skin is warm and dry.      Findings: No rash.   Neurological:      General: No focal deficit present.      Mental Status: He is alert and oriented to person, place, and time.   Psychiatric:         Mood and Affect: Mood normal.         Behavior: Behavior normal.          Assessment / Plan      Assessment & Plan   Problem List Items Addressed This Visit    None  Visit Diagnoses     Weakness generalized    -  Primary    Relevant Orders    KWAN With / DsDNA, RNP, Sjogrens A / B, Rodríguez    TSH Rfx On Abnormal To Free T4    Vitamin B12    Vitamin D 25 hydroxy    Folate    Methylmalonic Acid, Serum    Testosterone (Free & Total), LC / MS    Chronic fatigue        Relevant Orders    KWAN With / DsDNA, RNP, Sjogrens A / B, Rodríguez    TSH Rfx On Abnormal To Free T4    Vitamin B12    Vitamin D 25 hydroxy    Folate    Methylmalonic Acid, Serum    Testosterone (Free & Total), LC / MS    Chronic cough        Relevant Orders    KWAN With / DsDNA, RNP, Sjogrens A / B, Rodríguez    TSH Rfx On Abnormal To Free T4    Vitamin B12    Vitamin D 25 hydroxy    Folate    Methylmalonic Acid, Serum    Testosterone (Free & Total), LC / MS    Post-COVID syndrome        Relevant Orders    KWAN With / DsDNA, RNP, Sjogrens A / B, Rodríguez    TSH Rfx On Abnormal To Free T4    Vitamin B12    Vitamin D 25 hydroxy    Folate    Methylmalonic  Acid, Serum    Testosterone (Free & Total), LC / MS    Vitamin D deficiency        Relevant Orders    Methylmalonic Acid, Serum    Testosterone (Free & Total), LC / MS    Vague non-specific mostly chronic complaints. Will obtain lab work up as above, could possibly be related to a post-COVID syndrome. Recommend sleep hygiene, daily sun exposure, healthy diet, exercise as tolerated, and will replace vitamins as indicated. Consider repeating sleep study with negative lab workup.  Follow-up in 6 weeks.          Follow Up   Return in about 6 weeks (around 1/20/2023) for Annual.  Patient was given instructions and counseling regarding his condition or for health maintenance advice. Please see specific information pulled into the AVS if appropriate.     ASHWINI Mendieta  [facility] - [place]

## 2022-12-12 ENCOUNTER — OFFICE VISIT (OUTPATIENT)
Dept: NEUROLOGY | Facility: CLINIC | Age: 47
End: 2022-12-12

## 2022-12-12 ENCOUNTER — LAB (OUTPATIENT)
Dept: LAB | Facility: HOSPITAL | Age: 47
End: 2022-12-12

## 2022-12-12 DIAGNOSIS — I67.89 CEREBRAL MICROVASCULAR DISEASE: ICD-10-CM

## 2022-12-12 DIAGNOSIS — H93.13 TINNITUS OF BOTH EARS: ICD-10-CM

## 2022-12-12 DIAGNOSIS — M79.7 FIBROMYALGIA: Primary | ICD-10-CM

## 2022-12-12 LAB
BACTERIA SPEC RESP CULT: NORMAL
GRAM STN SPEC: NORMAL
GRAM STN SPEC: NORMAL

## 2022-12-12 PROCEDURE — 86038 ANTINUCLEAR ANTIBODIES: CPT | Performed by: NURSE PRACTITIONER

## 2022-12-12 PROCEDURE — 82746 ASSAY OF FOLIC ACID SERUM: CPT | Performed by: NURSE PRACTITIONER

## 2022-12-12 PROCEDURE — 99422 OL DIG E/M SVC 11-20 MIN: CPT | Performed by: NURSE PRACTITIONER

## 2022-12-12 PROCEDURE — 84402 ASSAY OF FREE TESTOSTERONE: CPT | Performed by: NURSE PRACTITIONER

## 2022-12-12 PROCEDURE — 84403 ASSAY OF TOTAL TESTOSTERONE: CPT | Performed by: NURSE PRACTITIONER

## 2022-12-12 PROCEDURE — 82607 VITAMIN B-12: CPT | Performed by: NURSE PRACTITIONER

## 2022-12-12 PROCEDURE — 87205 SMEAR GRAM STAIN: CPT | Performed by: NURSE PRACTITIONER

## 2022-12-12 PROCEDURE — 83921 ORGANIC ACID SINGLE QUANT: CPT | Performed by: NURSE PRACTITIONER

## 2022-12-12 PROCEDURE — 36415 COLL VENOUS BLD VENIPUNCTURE: CPT | Performed by: NURSE PRACTITIONER

## 2022-12-12 PROCEDURE — 82306 VITAMIN D 25 HYDROXY: CPT | Performed by: NURSE PRACTITIONER

## 2022-12-12 PROCEDURE — 84443 ASSAY THYROID STIM HORMONE: CPT | Performed by: NURSE PRACTITIONER

## 2022-12-12 NOTE — PROGRESS NOTES
"     Follow Up Office Visit      Patient Name: Fei Ojeda  : 1975   MRN: [id number]     Chief Complaint:    Chief Complaint   Patient presents with   • Follow-up     PATIENT REQUESTED TELEPHONE.FOLLOWING UP ON NUMBNESS AND TINGLING.    • Results     FOLLOW UP ON MRI RESULTS.         History of Present Illness: Fei Ojeda is a 47 y.o. male who is here today to follow up and was last seen on 10/10/2022.  He has only had one other episode of numbness and tingling to the left side of his face.  He continues to have \"that strange feeling\" and that comes and goes.  He is having ringing in both ears and saw ENT recently and was told he has mild hearing loss bilaterally.  He has fibromyalgia and has had some \"flare ups\" of that.  He has widespread body pain that seems worse with prolonged sitting and standing.  He states, \"It's in all 11 of those pressure points\".  He continues to take Cymbalta but is now taking it PRN because it caused a strong metallic taste in his mouth.  When asked what his most bothersome symptom is right now he says pain and fatigue.  He states, \"I got COVID back in May and I haven't really been the same since, I have memory issues and I am not so sure I don't have long COVID\".  He says all of his symptoms began about 90 days after he had COVID.   *MRI brain with and without contrast    Following taken from previous visit note:  Fei Ojeda is a 47 y.o. male who is here today to establish care with Neurology.  He complains of numbness and tingling to the left side of his face (comes and goes) and a strange feeling inside his head- symptoms began a couple of months ago.  Feels like he initially woke up with the symptoms one morning and went to the ED because he thought he could be having a stroke.  When he lived in [place], and went to the ED and had a CT and was told he could have trigeminal neuralgia.  He describes the strange feeling in his head as feeling as if his head is " "floating and a \"swimmy feeling\"- there is no dizziness present.  He gets sharp pains up the back part of the head- lasting for a few seconds and then going away.  He has been treated with steroids in the past and they did help.  He does mention he saw ENT previously; Dr. Fuchs around 2021 for similar symptoms.  He does occassionally have numbness and tingling in his fingers and toes.  He does say he has anxiety and has had a lot of social stressors recently- long time partner passed away suddenly and mother was just entered a nursing home.  He denies any history of chronic alcohol use.  Additional risk factors- HIV (on therapy since 2012), anxiety disorder, PTSD, varicose veins, fibromyalgia, hyperlipidemia.      Pertinent Medical History:  On 9/26/2022 Vitamin B12, CBC with differential, CMP and HgbA1c were all noncontributory.  CT head without contrast on 7/27/2022 was normal.       Subjective      Review of Systems:   Review of Systems   Constitutional: Negative for chills, fatigue and fever.   HENT: Negative for facial swelling, hearing loss, sore throat, tinnitus and trouble swallowing.    Eyes: Negative for blurred vision, double vision, photophobia and visual disturbance.   Respiratory: Negative for cough, chest tightness and shortness of breath.    Cardiovascular: Negative for chest pain, palpitations and leg swelling.   Gastrointestinal: Negative for abdominal pain, nausea and vomiting.   Endocrine: Negative for cold intolerance and heat intolerance.   Musculoskeletal: Negative for gait problem, neck pain and neck stiffness.        Widespread body pain   Skin: Negative for color change and rash.   Allergic/Immunologic: Negative for environmental allergies and food allergies.   Neurological: Positive for numbness and headache. Negative for dizziness, syncope, speech difficulty, weakness, light-headedness and memory problem.   Psychiatric/Behavioral: Negative for behavioral problems, sleep disturbance and " depressed mood. The patient is not nervous/anxious.        I have reviewed and the following portions of the patient's history were updated as appropriate: past family history, past medical history, past social history, past surgical history and problem list.    Medications:     Current Outpatient Medications:   •  acyclovir (ZOVIRAX) 5 % ointment, acyclovir 5 % topical ointment, Disp: , Rfl:   •  Bictegravir-Emtricitab-Tenofov (BIKTARVY) -25 MG per tablet, Take 1 tablet by mouth Daily., Disp: , Rfl:   •  calcium carbonate (OS-SHERRI) 600 MG tablet, Take 600 mg by mouth Daily., Disp: , Rfl:   •  DULoxetine (CYMBALTA) 60 MG capsule, Take 60 mg by mouth As Needed. Takes PRN with fibromyalgia flares, Disp: , Rfl:   •  glucose monitor monitoring kit, 1 each As Needed (bid). Test BID please provide what insurance covers, Disp: 1 each, Rfl: 1  •  Lancets (OneTouch Delica Plus [id number]) misc, , Disp: , Rfl:   •  mometasone (Nasonex) 50 MCG/ACT nasal spray, 2 sprays into the nostril(s) as directed by provider Daily., Disp: 17 g, Rfl: 0  •  OneTouch Ultra test strip, , Disp: , Rfl:     Allergies:   Allergies   Allergen Reactions   • Erythromycin GI Intolerance       Objective     Physical Exam:  Vital Signs: There were no vitals filed for this visit.  There is no height or weight on file to calculate BMI.    Physical Exam  Constitutional:       Appearance: Normal appearance. He is normal weight.   HENT:      Head: Normocephalic and atraumatic.   Eyes:      Extraocular Movements: Extraocular movements intact.      Conjunctiva/sclera: Conjunctivae normal.   Pulmonary:      Effort: Pulmonary effort is normal. No respiratory distress.   Skin:     General: Skin is dry.   Neurological:      Mental Status: He is alert and oriented to person, place, and time.   Psychiatric:         Mood and Affect: Mood normal.         Behavior: Behavior normal.         Thought Content: Thought content normal.         Judgment: Judgment normal.          Neurologic Exam     Mental Status   Oriented to person, place, and time.        Assessment / Plan      Assessment/Plan:   Diagnoses and all orders for this visit:    1. Fibromyalgia (Primary)    2. Cerebral microvascular disease    3. Tinnitus of both ears    4. BMI 24.0-24.9, adult    *I have provided education on Fibromyalgia and Gabapentin today. He is going to consider a trial of Gabapentin. I have encouraged him to go to the [facility] and review [place] COVID symptoms.      *This was a video visit that lasted for 17 minutes (Pay[employer] application was utilized). The patient was at home and I was in my office at [facility] during the visit. Patient identity was verified x2.     Follow Up:   Return if symptoms worsen or fail to improve.    ASHWINI Gutierrez, FNP-C  [facility]       Please note that portions of this note may have been completed with a voice recognition program. Efforts were made to edit the dictations, but occasionally words are mistranscribed.

## 2022-12-13 LAB
25(OH)D3 SERPL-MCNC: 27.2 NG/ML (ref 30–100)
FOLATE SERPL-MCNC: 10 NG/ML (ref 4.78–24.2)
TSH SERPL DL<=0.05 MIU/L-ACNC: 3.46 UIU/ML (ref 0.27–4.2)
VIT B12 BLD-MCNC: 213 PG/ML (ref 211–946)

## 2022-12-14 LAB — ANA SER QL: NEGATIVE

## 2022-12-16 LAB — METHYLMALONATE SERPL-SCNC: 2703 NMOL/L (ref 0–378)

## 2022-12-17 LAB
TESTOST FREE SERPL-MCNC: 4.3 PG/ML (ref 6.8–21.5)
TESTOST SERPL-MCNC: 651.5 NG/DL (ref 264–916)

## 2022-12-20 DIAGNOSIS — E53.8 VITAMIN B12 DEFICIENCY: Primary | ICD-10-CM

## 2022-12-20 DIAGNOSIS — R79.89 DECREASED TESTOSTERONE LEVEL IN MALE: Primary | ICD-10-CM

## 2022-12-20 RX ORDER — CYANOCOBALAMIN 1000 UG/ML
1000 INJECTION, SOLUTION INTRAMUSCULAR; SUBCUTANEOUS WEEKLY
Status: SHIPPED | OUTPATIENT
Start: 2022-12-20 | End: 2023-01-17

## 2022-12-27 ENCOUNTER — LAB (OUTPATIENT)
Dept: LAB | Facility: HOSPITAL | Age: 47
End: 2022-12-27

## 2022-12-27 LAB
25(OH)D3 SERPL-MCNC: 22.8 NG/ML (ref 30–100)
CALCIUM SPEC-SCNC: 9.1 MG/DL (ref 8.6–10.5)
DEPRECATED RDW RBC AUTO: 42.3 FL (ref 37–54)
ERYTHROCYTE [DISTWIDTH] IN BLOOD BY AUTOMATED COUNT: 12.7 % (ref 12.3–15.4)
HCT VFR BLD AUTO: 40.5 % (ref 37.5–51)
HGB BLD-MCNC: 13.6 G/DL (ref 13–17.7)
MCH RBC QN AUTO: 31.1 PG (ref 26.6–33)
MCHC RBC AUTO-ENTMCNC: 33.6 G/DL (ref 31.5–35.7)
MCV RBC AUTO: 92.5 FL (ref 79–97)
PLATELET # BLD AUTO: 310 10*3/MM3 (ref 140–450)
PMV BLD AUTO: 10.7 FL (ref 6–12)
RBC # BLD AUTO: 4.38 10*6/MM3 (ref 4.14–5.8)
WBC NRBC COR # BLD: 7.71 10*3/MM3 (ref 3.4–10.8)

## 2022-12-27 PROCEDURE — 84403 ASSAY OF TOTAL TESTOSTERONE: CPT | Performed by: NURSE PRACTITIONER

## 2022-12-27 PROCEDURE — 85027 COMPLETE CBC AUTOMATED: CPT | Performed by: NURSE PRACTITIONER

## 2022-12-27 PROCEDURE — 82310 ASSAY OF CALCIUM: CPT | Performed by: NURSE PRACTITIONER

## 2022-12-27 PROCEDURE — 36415 COLL VENOUS BLD VENIPUNCTURE: CPT | Performed by: NURSE PRACTITIONER

## 2022-12-27 PROCEDURE — 82306 VITAMIN D 25 HYDROXY: CPT | Performed by: NURSE PRACTITIONER

## 2022-12-27 PROCEDURE — 84402 ASSAY OF FREE TESTOSTERONE: CPT | Performed by: NURSE PRACTITIONER

## 2022-12-28 DIAGNOSIS — E55.9 VITAMIN D DEFICIENCY: Primary | ICD-10-CM

## 2022-12-28 RX ORDER — ERGOCALCIFEROL 1.25 MG/1
50000 CAPSULE ORAL WEEKLY
Qty: 6 CAPSULE | Refills: 0 | Status: SHIPPED | OUTPATIENT
Start: 2022-12-28

## 2022-12-30 ENCOUNTER — CLINICAL SUPPORT (OUTPATIENT)
Dept: INTERNAL MEDICINE | Facility: CLINIC | Age: 47
End: 2022-12-30

## 2022-12-30 DIAGNOSIS — E53.8 VITAMIN B12 DEFICIENCY: ICD-10-CM

## 2022-12-30 PROCEDURE — 96372 THER/PROPH/DIAG INJ SC/IM: CPT | Performed by: NURSE PRACTITIONER

## 2022-12-30 RX ADMIN — CYANOCOBALAMIN 1000 MCG: 1000 INJECTION, SOLUTION INTRAMUSCULAR; SUBCUTANEOUS at 09:57

## 2023-01-03 DIAGNOSIS — E29.1 HYPOGONADISM IN MALE: Primary | ICD-10-CM

## 2023-01-06 ENCOUNTER — OFFICE VISIT (OUTPATIENT)
Dept: INTERNAL MEDICINE | Facility: CLINIC | Age: 48
End: 2023-01-06
Payer: COMMERCIAL

## 2023-01-06 VITALS
SYSTOLIC BLOOD PRESSURE: 112 MMHG | DIASTOLIC BLOOD PRESSURE: 64 MMHG | OXYGEN SATURATION: 98 % | TEMPERATURE: 97 F | HEART RATE: 71 BPM | BODY MASS INDEX: 23.88 KG/M2 | WEIGHT: 176.31 LBS | HEIGHT: 72 IN

## 2023-01-06 DIAGNOSIS — T14.8XXA TRAUMATIC HEMATOMA: ICD-10-CM
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
## 2023-01-06 PROCEDURE — 99213 OFFICE O/P EST LOW 20 MIN: CPT | Performed by: NURSE PRACTITIONER

## 2023-01-06 RX ORDER — RABIES VACCINE 2.5 UNIT
0.1 KIT INTRAMUSCULAR
COMMUNITY
Start: 2023-01-06 | End: 2023-01-06

## 2023-01-06 NOTE — PROGRESS NOTES
Chief Complaint   Patient presents with   • Follow-up     ER visit [facility] 1/1/23, large hematoma on back from fall, now spreading around to L side     Subjective   Fei Ojeda is a 47 y.o. male.     History of Present Illness     Patient presents for follow up fall / hematoma from 1/1/23. Fell and landed on steps.   Bruising left side, spread to middle of back. Hematoma was present on lumbar spine and was told \"it may need drained if no improvement\". He took NSAIDs and iced it the first few days.   No pain associated with it. No signs of infection. No chills, myalgia, fever. Unsure if worsening or better due to location.      The following portions of the patient's history were reviewed and updated as appropriate: allergies, current medications, past family history, past medical history, past social history, past surgical history and problem list.    Objective   /64   Pulse 71   Temp 97 °F (36.1 °C) (Temporal)   Ht 182.9 cm (72\")   Wt 80 kg (176 lb 5 oz)   SpO2 98%   BMI 23.91 kg/m²   Body mass index is 23.91 kg/m².  Physical Exam  Vitals and nursing note reviewed.   Constitutional:       Appearance: Normal appearance.   HENT:      Head: Normocephalic and atraumatic.      Right Ear: Tympanic membrane, ear canal and external ear normal.      Left Ear: Tympanic membrane, ear canal and external ear normal.      Nose: Nose normal.      Mouth/Throat:      Lips: Pink.      Mouth: Mucous membranes are moist.      Pharynx: Oropharynx is clear. Uvula midline. No posterior oropharyngeal erythema.   Eyes:      Extraocular Movements: Extraocular movements intact.   Cardiovascular:      Rate and Rhythm: Normal rate.   Pulmonary:      Effort: Pulmonary effort is normal.   Musculoskeletal:         General: Normal range of motion.      Cervical back: Normal range of motion.   Lymphadenopathy:      Cervical: No cervical adenopathy.   Skin:     Findings: Bruising and ecchymosis present.             Comments:  ecchymosis along left side and middle of lumbar spine, large hematoma noted, soft to touch, nontender, no signs of infection    Neurological:      General: No focal deficit present.      Mental Status: He is alert and oriented to person, place, and time.   Psychiatric:         Mood and Affect: Mood normal.         Behavior: Behavior normal.         Thought Content: Thought content normal.         Judgment: Judgment normal.         Assessment & Plan   Fei Ojeda is here today and the following problems have been addressed:      Diagnoses and all orders for this visit:    1. Fall, subsequent encounter (Primary)    2. Traumatic hematoma  -     Ambulatory Referral to General Surgery    Referral to general surgery for opinion/evaluate hematoma and decide if it needs drained due to the size and location. Continue icing area and taking NSAIDs prn. Ecchymosis will take weeks to heal. Patient is pain free. Discussed signs of when to be seen urgently.      Follow Up   Return for Next scheduled follow up.  Patient was given instructions and counseling regarding his condition or for health maintenance advice. Please see specific information pulled into the AVS if appropriate.     Lizzie MARADIAGA  [facility] - [place]

## 2023-01-09 ENCOUNTER — TELEPHONE (OUTPATIENT)
Dept: INTERNAL MEDICINE | Facility: CLINIC | Age: 48
End: 2023-01-09
Payer: COMMERCIAL
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
## 2023-01-09 DIAGNOSIS — T14.8XXA TRAUMATIC HEMATOMA: ICD-10-CM

## 2023-01-09 NOTE — TELEPHONE ENCOUNTER
Provider: XIANG     Caller: CORRINA NGUYEN     Phone Number: [phone]    Reason for Call: PATIENT CALLED SURGERY ABOUT HEMATOLOGY DRAINAGE SURGERY AND WAS TOLD THAT HE WOULD HAVE TO GO TO HEMATOLOGY TO GET HEMATOMA DRAINED.

## 2023-01-27 ENCOUNTER — OFFICE VISIT (OUTPATIENT)
Dept: INTERNAL MEDICINE | Facility: CLINIC | Age: 48
End: 2023-01-27
Payer: COMMERCIAL

## 2023-01-27 VITALS
OXYGEN SATURATION: 99 % | TEMPERATURE: 98.4 F | DIASTOLIC BLOOD PRESSURE: 78 MMHG | HEART RATE: 68 BPM | BODY MASS INDEX: 23.84 KG/M2 | HEIGHT: 72 IN | WEIGHT: 176 LBS | SYSTOLIC BLOOD PRESSURE: 110 MMHG

## 2023-01-27 DIAGNOSIS — R29.898 TRANSIENT LEFT LEG WEAKNESS: ICD-10-CM
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
## 2023-01-27 DIAGNOSIS — B20 HIV INFECTION, UNSPECIFIED SYMPTOM STATUS: ICD-10-CM

## 2023-01-27 DIAGNOSIS — M79.605 LEFT LEG PAIN: ICD-10-CM

## 2023-01-27 PROCEDURE — 99214 OFFICE O/P EST MOD 30 MIN: CPT | Performed by: NURSE PRACTITIONER

## 2023-01-27 RX ORDER — DESVENLAFAXINE SUCCINATE 50 MG/1
50 TABLET, EXTENDED RELEASE ORAL
COMMUNITY
Start: 2023-01-13

## 2023-03-21 ENCOUNTER — TELEPHONE (OUTPATIENT)
Dept: INTERNAL MEDICINE | Facility: CLINIC | Age: 48
End: 2023-03-21

## 2023-03-21 NOTE — TELEPHONE ENCOUNTER
"Caller: Fei Ojeda \"James\"    Relationship: Self    Best call back number: [phone]    What test was performed: NERVE CONDUCTION    Additional notes:     PATIENT WANTS TO KNOW WHAT THE HOLD UP IS.  HE NEEDS HIS RESULTS        "

## 2023-03-22 ENCOUNTER — TELEPHONE (OUTPATIENT)
Dept: INTERNAL MEDICINE | Facility: CLINIC | Age: 48
End: 2023-03-22
Payer: COMMERCIAL

## 2023-03-22 ENCOUNTER — TELEPHONE (OUTPATIENT)
Dept: NEUROLOGY | Facility: CLINIC | Age: 48
End: 2023-03-22
Payer: COMMERCIAL

## 2023-03-22 DIAGNOSIS — R29.898 TRANSIENT LEFT LEG WEAKNESS: Primary | ICD-10-CM

## 2023-03-22 DIAGNOSIS — G62.9 PERIPHERAL POLYNEUROPATHY: ICD-10-CM

## 2023-03-22 NOTE — TELEPHONE ENCOUNTER
Provider: DEEPTI GUEVARA  Caller: PATIENT  Relationship to Patient: SELF  Phone Number: [phone]  Reason for Call: PATIENT IS HAVING TROUBLE GETTING NCV TEST RESULTS FROM PCP AND WANTED TO SEE IF PROVIDER COULD POSSIBLY GET RESULTS FOR HIM. PLEASE REVIEW AND ADVISE, BEST TO CALL  BACK AFTER 12:30 PM.

## 2023-03-22 NOTE — TELEPHONE ENCOUNTER
PT CALLED WANTING RESULTS OF NERVE CONDUCTION STUDY. WAS SCANNED INTO [employer] ON 3.8.2023. PT HAS FALLEN AGAIN. PLEASE CALL AND LET PT KNOW RESULTS OR HE SAID MAKE AVAILABLE TO WHERE HE CAN READ THEM. CONFIRMED PHONE NUMBER

## 2023-03-22 NOTE — TELEPHONE ENCOUNTER
Spoke with Fei, advised of EMG results.  He will contact ID to see if this could be related to HIV or HIV treatment.  I will place a referral to neurology for second opinion.  Mailing a copy of EMG report to James.

## 2023-03-24 ENCOUNTER — PATIENT MESSAGE (OUTPATIENT)
Dept: INTERNAL MEDICINE | Facility: CLINIC | Age: 48
End: 2023-03-24
Payer: COMMERCIAL

## 2023-03-28 NOTE — TELEPHONE ENCOUNTER
Sent patient a mychart message letting him know that his PCP is the one that ordered that test and he would not to contact that office or the place he had his test completed at.

## 2023-04-03 NOTE — TELEPHONE ENCOUNTER
From: Fei Ojeda  To: Lauren Petersen  Sent: 3/24/2023 4:04 PM EDT  Subject: Labs    Lauren:    I was wondering if you could send lab order to re-check my MMA, vitamins b12 and D and thyroid and testosterone. I am in [facility] now. My mom is sick so I’m here helping my sister with her for a while. I will need things sent to [facility] in [place] from now on. I am looking for an neurologist here for you to refer me too as well. Do you think repeating my labs at this time is necessary? Thank you.     James   Patient did not express goals at this time

## 2023-04-12 ENCOUNTER — DOCUMENTATION (OUTPATIENT)
Dept: NEUROLOGY | Facility: CLINIC | Age: 48
End: 2023-04-12
Payer: COMMERCIAL

## 2023-04-12 ENCOUNTER — OFFICE VISIT (OUTPATIENT)
Dept: INTERNAL MEDICINE | Facility: CLINIC | Age: 48
End: 2023-04-12
Payer: COMMERCIAL

## 2023-04-12 VITALS
HEART RATE: 76 BPM | OXYGEN SATURATION: 97 % | BODY MASS INDEX: 24.26 KG/M2 | DIASTOLIC BLOOD PRESSURE: 70 MMHG | SYSTOLIC BLOOD PRESSURE: 110 MMHG | WEIGHT: 179.12 LBS | TEMPERATURE: 98.2 F | HEIGHT: 72 IN

## 2023-04-12 DIAGNOSIS — G62.9 PERIPHERAL POLYNEUROPATHY: ICD-10-CM

## 2023-04-12 DIAGNOSIS — E53.8 VITAMIN B12 DEFICIENCY: ICD-10-CM

## 2023-04-12 DIAGNOSIS — R79.89 LOW TESTOSTERONE IN MALE: ICD-10-CM

## 2023-04-12 DIAGNOSIS — H93.13 TINNITUS OF BOTH EARS: Primary | ICD-10-CM

## 2023-04-12 PROCEDURE — 99213 OFFICE O/P EST LOW 20 MIN: CPT | Performed by: NURSE PRACTITIONER

## 2023-04-12 RX ORDER — METHYLPREDNISOLONE 4 MG/1
TABLET ORAL
COMMUNITY
Start: 2023-03-25

## 2023-04-12 RX ORDER — HYDROXYZINE PAMOATE 25 MG/1
25 CAPSULE ORAL
COMMUNITY
Start: 2023-04-12

## 2023-04-12 NOTE — PROGRESS NOTES
"  Office Visit      Patient Name: Fei Ojeda  : 1975   MRN: [id number]   Care Team: Patient Care Team:  Jessica Garcia APRN as PCP - General (Family Medicine)    Chief Complaint  Follow-up (Referral on Urology FU/Referral to ENT/Discuss labs)    Subjective     Subjective      Fei Ojeda presents to [facility] for referrals and discuss NCS.   Recently had to move to [place] to take care of mother who is ill with chronic disease.  He has been established with  for both urology for low testosterone and ENT for chronic ear complaints. Wishing to get referrals today to providers closer to him.   He took a  first and had a large hematoma on his low back. He saw ortho for this problem who recommended symptomatic management. Symptoms have slowly improved. A NCS was ordered and performed due to the pain down the left leg. This test showed diffuse peripheral neuropathy. He does suffer from vitamin b12 deficency, did do injections but couldn't get to the clinic to continue . He does have chronic fatigue. He needs labs rechecked today. Per his report he believes he was diagnosed with pernicious anemia when he lived in [place] many years ago.     Objective     Objective   Vital Signs:   /70   Pulse 76   Temp 98.2 °F (36.8 °C) (Infrared)   Ht 182.9 cm (72.01\")   Wt 81.2 kg (179 lb 1.9 oz)   SpO2 97%   BMI 24.29 kg/m²     Physical Exam  Vitals and nursing note reviewed.   Constitutional:       General: He is not in acute distress.     Appearance: Normal appearance. He is not toxic-appearing.   Eyes:      Pupils: Pupils are equal, round, and reactive to light.   Neck:      Vascular: No carotid bruit.   Cardiovascular:      Rate and Rhythm: Normal rate and regular rhythm.      Heart sounds: Normal heart sounds. No murmur heard.  Pulmonary:      Effort: Pulmonary effort is normal. No respiratory distress.      Breath sounds: Normal breath sounds. No wheezing. "   Musculoskeletal:      Cervical back: Neck supple. No tenderness.   Skin:     General: Skin is warm and dry.      Findings: No rash.   Neurological:      General: No focal deficit present.      Mental Status: He is alert.   Psychiatric:         Mood and Affect: Mood normal.         Behavior: Behavior normal.          Assessment / Plan      Assessment & Plan   Problem List Items Addressed This Visit    None  Visit Diagnoses     Tinnitus of both ears    -  Primary    Relevant Orders    Ambulatory Referral to ENT (Otolaryngology) (Completed)    Referral to ENT provider to establish care closer to his current home.     Low testosterone in male        Relevant Orders    Ambulatory Referral to Urology (Completed)    Referral to urology provider to establish care closer to his current home.     Vitamin B12 deficiency        Relevant Orders    Vitamin B12 Deficiency Cascade (Completed)    Methylmalonic Acid, Serum (Completed)    Intrinsic Factor Ab    Check labs today.we discussed possibility of going to clinic closer to his current home for injections vs sublingual B12. If true pernicious anemia he understands injections will be needed for better absorption.     Peripheral polyneuropathy        Likely multifactiorial, discussed role of b12 in peripheral neuropathy. He is working with ID who offered gabapentin, he would like to hold on this for now.         Follow Up   Return for Keep scheduled follow-up with PCP.  Patient was given instructions and counseling regarding his condition or for health maintenance advice. Please see specific information pulled into the AVS if appropriate.     ASHWINI Mendieta  [facility] - [place]

## 2023-04-12 NOTE — PROGRESS NOTES
Received a call from RAMY Hernandez, at 's ID clinic, and she wants to know if it would be okay to try a trial of Gabapentin for some symptoms patient is currently experiencing. I have advised her I asked him to consider a trial of Gabapentin at his previous visit with me- so I am totally okay with her prescribing Gabapentin for him. She is appreciative of the return call.

## 2023-04-18 LAB
APCA+IF AB INTERPRETATION: ABNORMAL
IF BLOCK AB SER-ACNC: 1 AU/ML (ref 0–1.1)
IF BLOCK AB SER-ACNC: ABNORMAL AU/ML
METHYLMALONATE SERPL-SCNC: 1230 NMOL/L (ref 0–378)
PCA AB SER-ACNC: 99.3 UNITS (ref 0–20)
VIT B12 SERPL-MCNC: 156 PG/ML (ref 232–1245)

## 2023-04-19 DIAGNOSIS — D51.0 PERNICIOUS ANEMIA: Primary | ICD-10-CM

## 2023-04-19 RX ORDER — CYANOCOBALAMIN 1000 UG/ML
1000 INJECTION, SOLUTION INTRAMUSCULAR; SUBCUTANEOUS
Status: DISCONTINUED | OUTPATIENT
Start: 2023-04-19 | End: 2023-04-20

## 2023-04-19 NOTE — PROGRESS NOTES
Covering for Lauren while she's out. Please let pt know he has extreme vitamin B12 deficiency evidenced by low vitamin B12 and high methylmalonic acid. Also has a lab that indicates he absorbs vitamin B12 poorly from gut. Suggest vitamin B12 shots every 2 weeks x 3 months then monthly thereafter. I've ordered the vitamin B12 shots for our office for the first 6 doses; if he needs a prescription sent to do them at home instead let me know. Follow up with PCP as scheduled, may need repeat labs at that time to check methylmalonic acid to see if the shots have helped.

## 2023-04-20 RX ORDER — CYANOCOBALAMIN 1000 UG/ML
1000 INJECTION, SOLUTION INTRAMUSCULAR; SUBCUTANEOUS
Qty: 10 ML | Refills: 0 | Status: SHIPPED | OUTPATIENT
Start: 2023-04-20

## 2023-04-28 ENCOUNTER — TELEPHONE (OUTPATIENT)
Dept: NEUROLOGY | Facility: CLINIC | Age: 48
End: 2023-04-28

## 2023-04-28 NOTE — TELEPHONE ENCOUNTER
Provider: DEEPTI GUEVARA  Caller: PATIENT  Relationship to Patient: SELF  Phone Number: [phone]  Reason for Call: PATIENT CALLED TO INFORM PROVIDER THAT HE HAS HAD A FEW FALLS AND IS CONSIDERING A TRIAL GABAPENTIN. REQUESTING A CALL FROM PROVIDER OR MA TO DISCUSS. PLEASE ADVISE, THANK YOU.

## 2023-05-01 NOTE — TELEPHONE ENCOUNTER
Please let him know the RAMY from his infectious disease clinic called and asked about a trial of Gabapentin. I told her I had previously suggested this to the patient and I was okay with her prescribing that for him. I would encourage him to follow up with her regarding his symptoms.

## 2023-06-12 RX ORDER — CYANOCOBALAMIN 1000 UG/ML
INJECTION, SOLUTION INTRAMUSCULAR; SUBCUTANEOUS
Qty: 6 ML | Refills: 1 | Status: SHIPPED | OUTPATIENT
Start: 2023-06-12

## 2023-06-12 NOTE — TELEPHONE ENCOUNTER
Rx Refill Note  Requested Prescriptions     Pending Prescriptions Disp Refills    cyanocobalamin 1000 MCG/ML injection [Pharmacy Med Name: CYANOCOBALAMIN 1,000 MCG/ML VL] 6 mL 1     Sig: INJECT 1 ML INTO THE APPROPRIATE MUSCLE AS DIRECTED BY PRESCRIBER EVERY 14 DAYS FOR 3 MONTHS, THEN MONTHLY THEREAFTER      Last office visit with prescribing clinician: 04/12/2023  Last telemedicine visit with prescribing clinician: Visit date not found   Next office visit with prescribing clinician: 08/11/2023  Last lab 04/2023

## 2023-10-06 ENCOUNTER — TELEPHONE (OUTPATIENT)
Dept: NEUROLOGY | Facility: CLINIC | Age: 48
End: 2023-10-06
Payer: COMMERCIAL

## 2023-10-06 NOTE — TELEPHONE ENCOUNTER
PT CALLED TO GET APPT COULD NOT GET ANYTHING TO COME UP. HE WAS IN A HURRY AN SAID IF SHE IS OUT THAT FAR WILL FIND SOMEONE ELSE. INFORMED IT MAY JUST BE A GLITCH , GIVE ME A MIN TO TRY A FEW THINGS. SAID I NEED SOMEONE WITHIN A MONTH ILL JUST FIND SOMEONE ELSE AND HAD TO GO.

## 2023-12-14 ENCOUNTER — OFFICE VISIT (OUTPATIENT)
Dept: INTERNAL MEDICINE | Facility: CLINIC | Age: 48
End: 2023-12-14
Payer: COMMERCIAL

## 2023-12-14 VITALS
WEIGHT: 189.8 LBS | BODY MASS INDEX: 25.71 KG/M2 | HEART RATE: 69 BPM | HEIGHT: 72 IN | OXYGEN SATURATION: 100 % | SYSTOLIC BLOOD PRESSURE: 119 MMHG | DIASTOLIC BLOOD PRESSURE: 79 MMHG | TEMPERATURE: 97.8 F

## 2023-12-14 DIAGNOSIS — G62.9 PERIPHERAL POLYNEUROPATHY: ICD-10-CM

## 2023-12-14 DIAGNOSIS — E53.8 VITAMIN B12 DEFICIENCY: Primary | ICD-10-CM

## 2023-12-14 PROCEDURE — 99213 OFFICE O/P EST LOW 20 MIN: CPT | Performed by: NURSE PRACTITIONER

## 2023-12-14 RX ORDER — PREGABALIN 25 MG/1
25 CAPSULE ORAL 2 TIMES DAILY
COMMUNITY
Start: 2023-10-10

## 2023-12-14 RX ORDER — IBUPROFEN 800 MG/1
800 TABLET ORAL EVERY 6 HOURS PRN
COMMUNITY

## 2023-12-14 NOTE — PROGRESS NOTES
"  Office Visit      Patient Name: Fei Ojeda  : 1975   MRN: [id number]   Care Team: Patient Care Team:  Jessica Garcia APRN as PCP - General (Family Medicine)    Chief Complaint  other (Requesting lab work.)    Subjective     Subjective      Fei Ojeda presents to [facility] for peripheral neuropathy and work accomodation.   Moved to [place] to care for his mother and currently seeing neurologist there for his peripheral neuropathy.   He has been seeing PT , PT has ordered a quad cane. This is due to his balance issues.  He has swelling in both lower extremities daily that is painful. Recommended that he would benefit from elevation when in a sitting position for prolonged periods of time. 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 His labs were concerning for pernicious anemia last visit and he is on b12 injections, this has not seemed to help his neuropathy symptoms.   He is followed closely by  for HIV. He is stable, recent labs were obtained today but have not resulted.    He has no other complaints or concerns today.      Objective     Objective   Vital Signs:   /79 (BP Location: Left arm)   Pulse 69   Temp 97.8 °F (36.6 °C) (Tympanic)   Ht 182.9 cm (72\")   Wt 86.1 kg (189 lb 12.8 oz)   SpO2 100%   BMI 25.74 kg/m²     Physical Exam  Vitals and nursing note reviewed.   Constitutional:       General: He is not in acute distress.     Appearance: Normal appearance. He is not toxic-appearing.   Eyes:      Pupils: Pupils are equal, round, and reactive to light.   Neck:      Vascular: No carotid bruit.   Cardiovascular:      Rate and Rhythm: Normal rate and regular rhythm.      Heart sounds: Normal heart sounds. No murmur heard.     Comments: Trace edema bilaterally    Pulmonary:      Effort: Pulmonary effort is normal. No respiratory distress.      Breath sounds: Normal breath sounds. No wheezing.   Abdominal:    "   General: Bowel sounds are normal. There is no distension.      Palpations: Abdomen is soft.      Tenderness: There is no abdominal tenderness.   Musculoskeletal:         General: Deformity present.      Cervical back: Neck supple. No tenderness.   Skin:     General: Skin is warm and dry.      Findings: No rash.   Neurological:      General: No focal deficit present.      Mental Status: He is alert and oriented to person, place, and time.   Psychiatric:         Mood and Affect: Mood normal.         Behavior: Behavior normal.          Assessment / Plan      Assessment & Plan   Problem List Items Addressed This Visit    None  Visit Diagnoses       Vitamin B12 deficiency    -  Primary    Relevant Orders    Vitamin B12    Folate    Peripheral polyneuropathy        Check labs today. Elevation of the lower extremities for 30 minutes at a  time 3-4 times/day would be ideal for his swelling. BP stable, no indication for diuretic. Decrease sodium, vitamin b12 replacement lifelong- may need further boost increase if needed based on labs. Follow-up with neurology as scheduled.           BMI is >= 25 and <30. (Overweight) The following options were offered after discussion;: information on healthy weight added to patient's after visit summary       Follow Up   Return for Annual.  Patient was given instructions and counseling regarding his condition or for health maintenance advice. Please see specific information pulled into the AVS if appropriate.     ASHWINI Mendieta  [facility]

## 2023-12-15 LAB
FOLATE SERPL-MCNC: 10.8 NG/ML
VIT B12 SERPL-MCNC: 440 PG/ML (ref 232–1245)

## 2024-03-15 RX ORDER — CYANOCOBALAMIN 1000 UG/ML
INJECTION, SOLUTION INTRAMUSCULAR; SUBCUTANEOUS
Qty: 6 ML | Refills: 1 | Status: SHIPPED | OUTPATIENT
Start: 2024-03-15

## 2024-05-01 ENCOUNTER — OFFICE VISIT (OUTPATIENT)
Dept: INTERNAL MEDICINE | Facility: CLINIC | Age: 49
End: 2024-05-01
Payer: COMMERCIAL

## 2024-05-01 VITALS
OXYGEN SATURATION: 98 % | TEMPERATURE: 98.2 F | SYSTOLIC BLOOD PRESSURE: 102 MMHG | DIASTOLIC BLOOD PRESSURE: 64 MMHG | HEART RATE: 83 BPM | BODY MASS INDEX: 25.47 KG/M2 | HEIGHT: 72 IN | WEIGHT: 188 LBS

## 2024-05-01 DIAGNOSIS — F43.10 POSTTRAUMATIC STRESS DISORDER: ICD-10-CM

## 2024-05-01 DIAGNOSIS — F41.9 ANXIETY: ICD-10-CM

## 2024-05-01 DIAGNOSIS — B20 HIV INFECTION, UNSPECIFIED SYMPTOM STATUS: Primary | ICD-10-CM

## 2024-05-01 DIAGNOSIS — F43.21 GRIEF: ICD-10-CM

## 2024-05-01 DIAGNOSIS — G62.9 PERIPHERAL POLYNEUROPATHY: ICD-10-CM

## 2024-05-01 DIAGNOSIS — F33.0 MILD EPISODE OF RECURRENT MAJOR DEPRESSIVE DISORDER: ICD-10-CM

## 2024-05-01 PROCEDURE — 99214 OFFICE O/P EST MOD 30 MIN: CPT | Performed by: NURSE PRACTITIONER

## 2024-05-01 RX ORDER — GABAPENTIN 100 MG/1
100 CAPSULE ORAL
COMMUNITY
Start: 2024-04-11

## 2024-05-01 RX ORDER — DULOXETIN HYDROCHLORIDE 30 MG/1
30 CAPSULE, DELAYED RELEASE ORAL DAILY
COMMUNITY
Start: 2024-04-11

## 2024-05-01 NOTE — PROGRESS NOTES
Office Visit      Patient Name: Fei Ojeda  : 1975   MRN: [id number]     Chief Complaint:    Chief Complaint   Patient presents with    Peripheral Neuropathy       History of Present Illness: Fei Ojeda is a 48 y.o. male who is here today to discuss peripheral neuropathy.  Has fallen 10 times in the past year.  Using cane to ambulate.  Unable to navigate on unlevel ground safely.  Both legs are swelling.  Working with PT.  Unable to take lyrica, made him sleep around the clock.  Now prescribed gabapentin, prn, as prescribed by HIV clinic.  Continues to take duloxetine 30 mg daily; tasted metal like nickel with higher doses.   HIV. Taking Biktarvy.    Mom passed away at the beginning of the year. Having panic attacks and sleep disturbances.  Affecting daily life.  Unable to complete simple tasks, due to not being able to concentrate. Going to counseling.  Discussing lexapro/celexa and buspar with neurology.  Denies anhedonia, feelings of worthlessness, difficulty concentrating, impaired memory, SI, HI, panic attacks, weight change.    Subjective      I have reviewed and the following portions of the patient's history were updated as appropriate: past family history, past medical history, past social history, past surgical history and problem list.      Current Outpatient Medications:     acyclovir (ZOVIRAX) 5 % ointment, acyclovir 5 % topical ointment, Disp: , Rfl:     Bictegravir-Emtricitab-Tenofov (BIKTARVY) -25 MG per tablet, Take 1 tablet by mouth Daily., Disp: , Rfl:     calcium carbonate (OS-SHERRI) 600 MG tablet, Take 1 tablet by mouth Daily., Disp: , Rfl:     cyanocobalamin 1000 MCG/ML injection, INJECT 1 ML INTO THE APPROPRIATE MUSCLE AS DIRECTED BY PRESCRIBER EVERY 14 DAYS FOR 3 MONTHS, THEN MONTHLY THEREAFTER, Disp: 6 mL, Rfl: 1    DULoxetine (CYMBALTA) 30 MG capsule, Take 1 capsule by mouth Daily., Disp: , Rfl:     gabapentin (NEURONTIN) 100 MG capsule, Take 1 capsule by mouth.,  "Disp: , Rfl:     glucose monitor monitoring kit, 1 each As Needed (bid). Test BID please provide what insurance covers, Disp: 1 each, Rfl: 1    hydrOXYzine pamoate (VISTARIL) 25 MG capsule, Take 1 capsule by mouth., Disp: , Rfl:     ibuprofen (ADVIL,MOTRIN) 800 MG tablet, Take 1 tablet by mouth Every 6 (Six) Hours As Needed., Disp: , Rfl:     Lancets (OneTouch Delica Plus Merfuf00A) misc, , Disp: , Rfl:     OneTouch Ultra test strip, , Disp: , Rfl:     Allergies   Allergen Reactions    Azithromycin GI Intolerance    Erythromycin GI Intolerance and Diarrhea     erythromycin - Gastrointestinal upset       Objective     Physical Exam:  Vital Signs:   Vitals:    05/01/24 1417   BP: 102/64   Pulse: 83   Temp: 98.2 °F (36.8 °C)   SpO2: 98%   Weight: 85.3 kg (188 lb)   Height: 182.9 cm (72.01\")     Body mass index is 25.49 kg/m².         Physical Exam  Constitutional:       Appearance: He is not ill-appearing.   HENT:      Head: Normocephalic.      Right Ear: External ear normal.      Left Ear: External ear normal.   Eyes:      Conjunctiva/sclera: Conjunctivae normal.      Pupils: Pupils are equal, round, and reactive to light.   Cardiovascular:      Rate and Rhythm: Normal rate and regular rhythm.      Pulses:           Radial pulses are 2+ on the right side and 2+ on the left side.        Dorsalis pedis pulses are 2+ on the right side and 2+ on the left side.      Heart sounds: Normal heart sounds.   Pulmonary:      Effort: Pulmonary effort is normal.      Breath sounds: Normal breath sounds.   Musculoskeletal:      Cervical back: Normal range of motion and neck supple.   Skin:     General: Skin is warm.      Capillary Refill: Capillary refill takes less than 2 seconds.   Neurological:      Mental Status: He is alert and oriented to person, place, and time.      Coordination: Coordination normal.      Gait: Gait normal.   Psychiatric:         Attention and Perception: Attention normal.         Mood and Affect: Mood and " affect normal.         Speech: Speech normal.         Behavior: Behavior normal.             Assessment / Plan      Assessment/Plan:   Diagnoses and all orders for this visit:    1. HIV infection, unspecified symptom status (Primary)        - Continue Biktarvy as prescribed by ID at     2. Peripheral polyneuropathy        - Continue gabapentin as prescribed by Neurology     3. Mild episode of recurrent major depressive disorder  4. Posttraumatic stress disorder  5. Grief  6. Anxiety        - Encouraged to take part in daily physical exercise.          - Eat healthy, well balanced diet; avoid sugary foods or beverages        - Continue to abstain from alcohol and drugs        - Ensure good night's sleep by creating calm space in bedroom, avoiding screen time 1-2 hours before bed, no caffeine after 5 pm        - Talk to supportive family and friends, as needed        - Continue counseling as scheduled     Encouraged to find disability , go to Dr Tafoya in [facility] for further evaluation of possible disability claim.      Follow Up:   Return for Annual.    Patient was given instructions and counseling regarding his condition or for health maintenance advice. Please see specific information pulled into the AVS if appropriate.       Primary Care [address] [facility]     Please note that portions of this note may have been completed with a voice recognition program. Efforts were made to edit dictation, but occasionally words are mistranscribed.

## 2024-05-28 ENCOUNTER — PATIENT MESSAGE (OUTPATIENT)
Dept: INTERNAL MEDICINE | Facility: CLINIC | Age: 49
End: 2024-05-28
Payer: COMMERCIAL

## 2024-05-28 DIAGNOSIS — E55.9 VITAMIN D DEFICIENCY: ICD-10-CM

## 2024-05-28 DIAGNOSIS — R79.89 ABNORMAL THYROID BLOOD TEST: ICD-10-CM

## 2024-05-28 DIAGNOSIS — R79.89 ABNORMAL SERUM TESTOSTERONE LEVEL: ICD-10-CM

## 2024-05-28 DIAGNOSIS — R79.0 LOW FERRITIN: Primary | ICD-10-CM

## 2024-06-03 NOTE — TELEPHONE ENCOUNTER
From: Fei Ojeda  To: Jessica Garcia  Sent: 5/28/2024 8:39 PM EDT  Subject: Neurologist    Jessica:    My neurologist is wanting me to have an IV iron Infusion due to a ferritin level of 8. I am looking for a new neurologist and may need a referal. Is there anyway you can order some labs for me. Will you recheck my Ferritin level and my thyroid, vitamin b12, vitamin d, iron level, platelets, also I had labs concerning for pernicious anemia and will you check my vitamin b 12 intrisic factor. I am very weak. My MMA level was very high as well. Alsou testosterone T4 Free was off too. I will be in [place] area about June 4th if you will order those I will stop in [facility] and get those completed. Thank you.

## 2024-06-04 ENCOUNTER — LAB (OUTPATIENT)
Dept: LAB | Facility: HOSPITAL | Age: 49
End: 2024-06-04
Payer: COMMERCIAL

## 2024-06-04 DIAGNOSIS — R79.0 LOW FERRITIN: ICD-10-CM

## 2024-06-04 DIAGNOSIS — R79.89 ABNORMAL SERUM TESTOSTERONE LEVEL: ICD-10-CM

## 2024-06-04 DIAGNOSIS — R79.89 ABNORMAL THYROID BLOOD TEST: ICD-10-CM

## 2024-06-04 DIAGNOSIS — E55.9 VITAMIN D DEFICIENCY: ICD-10-CM

## 2024-06-04 PROCEDURE — 84402 ASSAY OF FREE TESTOSTERONE: CPT

## 2024-06-04 PROCEDURE — 82607 VITAMIN B-12: CPT

## 2024-06-04 PROCEDURE — 83921 ORGANIC ACID SINGLE QUANT: CPT

## 2024-06-04 PROCEDURE — 82746 ASSAY OF FOLIC ACID SERUM: CPT

## 2024-06-04 PROCEDURE — 84403 ASSAY OF TOTAL TESTOSTERONE: CPT

## 2024-06-04 PROCEDURE — 82306 VITAMIN D 25 HYDROXY: CPT

## 2024-06-04 PROCEDURE — 84481 FREE ASSAY (FT-3): CPT | Performed by: NURSE PRACTITIONER

## 2024-06-04 PROCEDURE — 36415 COLL VENOUS BLD VENIPUNCTURE: CPT

## 2024-06-04 PROCEDURE — 84443 ASSAY THYROID STIM HORMONE: CPT

## 2024-06-04 PROCEDURE — 83090 ASSAY OF HOMOCYSTEINE: CPT

## 2024-06-04 PROCEDURE — 86376 MICROSOMAL ANTIBODY EACH: CPT

## 2024-06-04 PROCEDURE — 86340 INTRINSIC FACTOR ANTIBODY: CPT

## 2024-06-04 PROCEDURE — 86800 THYROGLOBULIN ANTIBODY: CPT

## 2024-06-04 PROCEDURE — 84439 ASSAY OF FREE THYROXINE: CPT

## 2024-06-05 LAB
BASOPHILS # BLD AUTO: 0.12 10*3/MM3 (ref 0–0.2)
BASOPHILS NFR BLD AUTO: 1.7 % (ref 0–1.5)
DEPRECATED RDW RBC AUTO: 50.6 FL (ref 37–54)
EOSINOPHIL # BLD AUTO: 0.15 10*3/MM3 (ref 0–0.4)
EOSINOPHIL NFR BLD AUTO: 2.2 % (ref 0.3–6.2)
ERYTHROCYTE [DISTWIDTH] IN BLOOD BY AUTOMATED COUNT: 14.6 % (ref 12.3–15.4)
FERRITIN SERPL-MCNC: 9.03 NG/ML (ref 30–400)
HCT VFR BLD AUTO: 42.2 % (ref 37.5–51)
HGB BLD-MCNC: 12.6 G/DL (ref 13–17.7)
IMM GRANULOCYTES # BLD AUTO: 0.01 10*3/MM3 (ref 0–0.05)
IMM GRANULOCYTES NFR BLD AUTO: 0.1 % (ref 0–0.5)
IRON 24H UR-MRATE: 22 MCG/DL (ref 59–158)
IRON SATN MFR SERPL: 4 % (ref 20–50)
LYMPHOCYTES # BLD AUTO: 2.64 10*3/MM3 (ref 0.7–3.1)
LYMPHOCYTES NFR BLD AUTO: 38.2 % (ref 19.6–45.3)
MCH RBC QN AUTO: 28.1 PG (ref 26.6–33)
MCHC RBC AUTO-ENTMCNC: 29.9 G/DL (ref 31.5–35.7)
MCV RBC AUTO: 94 FL (ref 79–97)
MONOCYTES # BLD AUTO: 0.79 10*3/MM3 (ref 0.1–0.9)
MONOCYTES NFR BLD AUTO: 11.4 % (ref 5–12)
NEUTROPHILS NFR BLD AUTO: 3.21 10*3/MM3 (ref 1.7–7)
NEUTROPHILS NFR BLD AUTO: 46.4 % (ref 42.7–76)
NRBC BLD AUTO-RTO: 0 /100 WBC (ref 0–0.2)
PLATELET # BLD AUTO: 329 10*3/MM3 (ref 140–450)
PMV BLD AUTO: 10.6 FL (ref 6–12)
RBC # BLD AUTO: 4.49 10*6/MM3 (ref 4.14–5.8)
TIBC SERPL-MCNC: 502 MCG/DL (ref 298–536)
TRANSFERRIN SERPL-MCNC: 337 MG/DL (ref 200–360)
WBC NRBC COR # BLD AUTO: 6.92 10*3/MM3 (ref 3.4–10.8)

## 2024-06-05 PROCEDURE — 83540 ASSAY OF IRON: CPT

## 2024-06-05 PROCEDURE — 82728 ASSAY OF FERRITIN: CPT

## 2024-06-05 PROCEDURE — 85025 COMPLETE CBC W/AUTO DIFF WBC: CPT | Performed by: NURSE PRACTITIONER

## 2024-06-05 PROCEDURE — 84466 ASSAY OF TRANSFERRIN: CPT

## 2024-06-05 NOTE — PROGRESS NOTES
Ferritin and iron are low.  Recommend infusion, if patient agrees.  Will also refer to hematology.

## 2024-06-06 LAB
25(OH)D3+25(OH)D2 SERPL-MCNC: 10.2 NG/ML (ref 30–100)
FOLATE SERPL-MCNC: 7.8 NG/ML
HCYS SERPL-SCNC: 9.9 UMOL/L (ref 0–14.5)
T3 SERPL-MCNC: 78 NG/DL (ref 71–180)
T3FREE SERPL-MCNC: 2.5 PG/ML (ref 2–4.4)
T4 FREE SERPL-MCNC: 1.16 NG/DL (ref 0.82–1.77)
THYROGLOB AB SERPL-ACNC: 1.6 IU/ML (ref 0–0.9)
THYROPEROXIDASE AB SERPL-ACNC: 136 IU/ML (ref 0–34)
TSH SERPL DL<=0.005 MIU/L-ACNC: 2.78 UIU/ML (ref 0.45–4.5)
VIT B12 SERPL-MCNC: 524 PG/ML (ref 232–1245)

## 2024-06-07 ENCOUNTER — PATIENT MESSAGE (OUTPATIENT)
Dept: INTERNAL MEDICINE | Facility: CLINIC | Age: 49
End: 2024-06-07
Payer: COMMERCIAL

## 2024-06-07 DIAGNOSIS — R76.8 THYROID ANTIBODY POSITIVE: Primary | ICD-10-CM

## 2024-06-10 ENCOUNTER — PATIENT MESSAGE (OUTPATIENT)
Dept: INTERNAL MEDICINE | Facility: CLINIC | Age: 49
End: 2024-06-10
Payer: COMMERCIAL

## 2024-06-10 DIAGNOSIS — E61.1 IRON DEFICIENCY: Primary | ICD-10-CM

## 2024-06-10 DIAGNOSIS — R79.0 LOW FERRITIN: ICD-10-CM

## 2024-06-10 DIAGNOSIS — D51.0 PERNICIOUS ANEMIA: ICD-10-CM

## 2024-06-10 LAB — IF BLOCK AB SER-ACNC: 1 AU/ML (ref 0–1.1)

## 2024-06-10 RX ORDER — ERGOCALCIFEROL 1.25 MG/1
50000 CAPSULE ORAL WEEKLY
Qty: 12 CAPSULE | Refills: 3 | Status: SHIPPED | OUTPATIENT
Start: 2024-06-10

## 2024-06-10 NOTE — PROGRESS NOTES
Awaiting methylmalonic acid and testosterone results.  Will notify patient when results are available.  Vitamin D significantly deficient sending weekly supplement.  Take with calcium to enhance absorption.  Sit with face and forearms exposed to the sun for 15 minutes a day to enhance absorption of vitamin D through the skin.  Thyroid antibodies are elevated, indicating inflammation.  TSH and free T4 are normal, indicating normal function.  Will continue to monitor thyroid function with future labs.  No medication necessary at this time.  Will order thyroid ultrasound, if patient agrees.  Remainder of labs are normal.

## 2024-06-11 LAB
METHYLMALONATE SERPL-SCNC: 293 NMOL/L (ref 0–378)
TESTOST FREE SERPL-MCNC: 2.4 PG/ML (ref 6.8–21.5)
TESTOST SERPL-MCNC: 491.3 NG/DL (ref 264–916)

## 2024-06-11 NOTE — TELEPHONE ENCOUNTER
From: Fei Ojeda  To: Jesisca Garcia  Sent: 6/10/2024 9:45 AM EDT  Subject: Vitamin D    Mattie: I need my prescriptions sent to [facility] pharmacy in [place], [place]. Thank youv

## 2024-06-11 NOTE — TELEPHONE ENCOUNTER
From: Fei Ojeda  To: Jessica Garcia  Sent: 6/7/2024 5:12 PM EDT  Subject: Thyroid antibodies    Mattie:    With my thyroid antibodies do I have Hashimoto's disease/hypothyroidism?

## 2024-06-13 ENCOUNTER — TELEPHONE (OUTPATIENT)
Dept: INTERNAL MEDICINE | Facility: CLINIC | Age: 49
End: 2024-06-13
Payer: COMMERCIAL

## 2024-06-13 NOTE — TELEPHONE ENCOUNTER
----- Message from Della BYNUM sent at 6/13/2024  2:29 PM EDT -----  Regarding: Kevin Rodrigues has denied the Feraheme d/t not meeting medical guidelines.  They don't cover IV Iron for Pernicious Anemia.  I've scanned the letter to his chart if you wish to appeal.  Please let me know if you have any questions or concerns.    Thanks  Della

## 2024-06-14 ENCOUNTER — PATIENT MESSAGE (OUTPATIENT)
Dept: INTERNAL MEDICINE | Facility: CLINIC | Age: 49
End: 2024-06-14
Payer: COMMERCIAL

## 2024-06-14 ENCOUNTER — TELEPHONE (OUTPATIENT)
Dept: INTERNAL MEDICINE | Facility: CLINIC | Age: 49
End: 2024-06-14
Payer: COMMERCIAL

## 2024-06-14 DIAGNOSIS — E61.1 IRON DEFICIENCY: Primary | ICD-10-CM

## 2024-06-14 DIAGNOSIS — E55.9 VITAMIN D DEFICIENCY: ICD-10-CM

## 2024-06-14 RX ORDER — UREA 10 %
325 LOTION (ML) TOPICAL
Qty: 90 TABLET | Refills: 3 | Status: SHIPPED | OUTPATIENT
Start: 2024-06-14 | End: 2025-06-14

## 2024-06-14 RX ORDER — ERGOCALCIFEROL 1.25 MG/1
50000 CAPSULE ORAL WEEKLY
Qty: 12 CAPSULE | Refills: 3 | Status: SHIPPED | OUTPATIENT
Start: 2024-06-14

## 2024-06-14 NOTE — TELEPHONE ENCOUNTER
"  Caller: Fei Ojeda \"James\"    Relationship to patient: Self    Best call back number: [phone]     PATIENT STATES THAT MONICA SENT A PRESCRIPTION FOR VITAMIN D TO [facility], BUT HE WANTS THAT RESENT TO [facility].  "

## 2024-06-14 NOTE — TELEPHONE ENCOUNTER
From: Jessica Garcia  To: Fei Ojeda  Sent: 6/14/2024 7:46 AM EDT  Subject: iron deficiency.    Hematology is asking you to try oral iron with a recheck in 4-6 weeks before they schedule an appointment. The infusion has been denied by your insurance. We do not have iron listed on your medication list.  This medication can interfere with Biktarvy, so please talk to pharmacy or your ID provider before starting it.   I'm sending a prescription for 65 mg of elemental iron once a day with a meal. Take it with vitamin C to enhance absorption. Many patients take it with orange juice, which you may already know. It can cause constipation and stomach upset.   I will also place the order for repeat bloodwork. Would you rather have that drawn closer to home? If so, let me know where and we should be able to fax the orders there.

## 2024-06-14 NOTE — TELEPHONE ENCOUNTER
"    Caller: Fei Ojeda \"James\"    Relationship to patient: Self    Best call back number: [phone]     PATIENT WOULD LIKE HIS LAB ORDERS FAXED TO [facility] IN [place].  "

## 2024-06-14 NOTE — TELEPHONE ENCOUNTER
Called a different number for hospital. Spoke with rep. Was asked to fax orders to [phone]. Faxed.

## 2024-06-15 ENCOUNTER — PATIENT MESSAGE (OUTPATIENT)
Dept: INTERNAL MEDICINE | Facility: CLINIC | Age: 49
End: 2024-06-15
Payer: COMMERCIAL

## 2024-06-15 DIAGNOSIS — R79.89 LOW TESTOSTERONE IN MALE: ICD-10-CM

## 2024-06-15 DIAGNOSIS — R79.89 ABNORMAL SERUM TESTOSTERONE LEVEL: Primary | ICD-10-CM

## 2024-06-21 ENCOUNTER — TELEPHONE (OUTPATIENT)
Dept: INTERNAL MEDICINE | Facility: CLINIC | Age: 49
End: 2024-06-21
Payer: COMMERCIAL

## 2024-06-21 ENCOUNTER — HOSPITAL ENCOUNTER (OUTPATIENT)
Dept: ULTRASOUND IMAGING | Facility: HOSPITAL | Age: 49
Discharge: HOME | End: 2024-06-21
Admitting: NURSE PRACTITIONER
Payer: COMMERCIAL

## 2024-06-21 PROCEDURE — 76536 US EXAM OF HEAD AND NECK: CPT

## 2024-06-21 NOTE — TELEPHONE ENCOUNTER
Please advise patient I have spoken to the infusion center and the order for an infusion will be re-sent to his insurance.      Also tell him happy birthday! Thanks!

## 2024-06-21 NOTE — TELEPHONE ENCOUNTER
"  Caller: Fei Ojeda \"James\"    Relationship: Self    Best call back number: [phone]     What is the best time to reach you: ANY    Who are you requesting to speak with (clinical staff, provider,  specific staff member):CLINICAL     What was the call regarding:     IRON MEDICATIONS ARE MAKING THE PATIENT SICK AND IT IS MESSING STOMACH UP. PATIENT WOULD LIKE TO SEE IF INFUSIONS CAN BE TRIED AGAIN SINCE PATIENT HAS TRIED THIS ORAL MEDICATION!    PATIENT STATED THAT THIS IS TAKEN WITH A STOOL SOFTENER AND VIT C FOR ABSORPTION TO HELP AS RECOMMENDED .   "

## 2024-07-02 ENCOUNTER — TELEPHONE (OUTPATIENT)
Dept: INTERNAL MEDICINE | Facility: CLINIC | Age: 49
End: 2024-07-02
Payer: COMMERCIAL

## 2024-07-02 NOTE — TELEPHONE ENCOUNTER
"  Caller: Fei Ojeda \"James\"    Relationship: Self    Best call back number: [phone]    What was the call regarding: PATIENT CALLED TO CHECK THE STATUS OF THE PRIOR AUTHORIZATION FOR THE IRON  INFUSIONS.   "

## 2024-07-02 NOTE — TELEPHONE ENCOUNTER
I've contacted infusion center.  Request for iron transfusion denied by insurance, stated patient did not meet criteria.  Someone from that office will contact me with more information regarding denial, will notify patient when I have more information.

## 2024-07-05 NOTE — TELEPHONE ENCOUNTER
Caller: HAROON DODGE    Relationship to patient: Self    Best call back number: [phone]     ECHO IS CALLING FOR THE STATUS OF THE IRON INFUSIONS.

## 2024-07-05 NOTE — TELEPHONE ENCOUNTER
"Spoke with patient. He said that he just spoke with Joy at infusion and was told that this is the wrong dx code for this infusion. He stated that other meds that he takes have depleted his vitamins and oral iron \"tears up\" his stomach.   "

## 2024-07-05 NOTE — TELEPHONE ENCOUNTER
Please call the patient and let him know I have spoken with the infusion center again today.  They will call him to discuss iron infusions/declination from insurance.  I tried to call the phone number listed in this message for Jessee.  I believe I reached this person's voicemail, left a message asking them to call me back with any questions.  Did not leave any patient sensitive information on that voicemail as I was not sure that it is secured nor was I certain that I had reached the correct person because the automated information on the voicemail was not easily understood.

## 2024-07-09 NOTE — TELEPHONE ENCOUNTER
Called, left voicemail advising patient that I have been working on trying to get associated diagnosis changed for iron infusion.  Will reach out to infusion center tomorrow morning, first thing.

## 2024-07-09 NOTE — TELEPHONE ENCOUNTER
Patient called along with [employer] insurance on the line requesting an update for the dx code needed for PA. Patient is very upset and states he needs the iron infusions asap.

## 2024-07-10 NOTE — TELEPHONE ENCOUNTER
Spoke with Fei today.  Advised I have spoken to infusion center today, awaiting update regarding insurance coverage for infusion associated with updated diagnoses.

## 2024-07-11 ENCOUNTER — TELEPHONE (OUTPATIENT)
Dept: INTERNAL MEDICINE | Facility: CLINIC | Age: 49
End: 2024-07-11

## 2024-07-11 DIAGNOSIS — T45.4X5A ADVERSE EFFECT OF IRON, INITIAL ENCOUNTER: ICD-10-CM

## 2024-07-11 DIAGNOSIS — E61.1 IRON DEFICIENCY: Primary | ICD-10-CM

## 2024-07-12 PROBLEM — T45.4X5A: Status: ACTIVE | Noted: 2024-07-12

## 2024-07-12 PROBLEM — E61.1 IRON DEFICIENCY: Status: ACTIVE | Noted: 2024-07-12

## 2024-07-12 NOTE — TELEPHONE ENCOUNTER
Relayed to patient. He said that he spoke with an Kevin supervisor yest. and is supposed to follow back up with that person later. Needs actual provider to send another code, not the infusion center. May appeal. Has not tried a liquid iron before, but will try if necessary.

## 2024-07-15 ENCOUNTER — TELEPHONE (OUTPATIENT)
Dept: INTERNAL MEDICINE | Facility: CLINIC | Age: 49
End: 2024-07-15
Payer: COMMERCIAL

## 2024-07-15 NOTE — TELEPHONE ENCOUNTER
Yelena called from [place] to check on 2nd authorization for iron infusions since patient cannot tolerate oral Iron. Patient does not yet have an appointment with Hematology/Oncology. Patient was to be seen tomorrow but had to cancel due to a mandatory meeting @ work. Yelena advises that New note needs to be sent to for authorization including his intolerance to Oral Iron as well as any other information that has been relayed in his messages such as falls due to weakness. Please advise. Thank you.

## 2024-07-15 NOTE — TELEPHONE ENCOUNTER
"Relay     \" He can purchase OTC liquid iron supplement, not covered by insurance.  May be in the kids' section of pharmacy.  Yes, he will need an appointment. I just received word from infusion that they cannot file for another auth for iron infusions for 60 days after the initial order.  If his symptoms are severe, needs to go to ED. \"                  "

## 2024-07-16 NOTE — TELEPHONE ENCOUNTER
Patient called back. Informed him of your advise. He states this would not New authorization but an appeal to update Dx. Could you check on this?

## 2024-07-18 ENCOUNTER — TELEPHONE (OUTPATIENT)
Dept: INTERNAL MEDICINE | Facility: CLINIC | Age: 49
End: 2024-07-18
Payer: COMMERCIAL

## 2024-07-22 ENCOUNTER — TELEPHONE (OUTPATIENT)
Dept: INFUSION THERAPY | Facility: HOSPITAL | Age: 49
End: 2024-07-22
Payer: COMMERCIAL

## 2024-07-22 NOTE — TELEPHONE ENCOUNTER
Called Patient to set up [facility]. Patient stated he wants to get infusions done in [place] since he is 4 hours from us. Patient did not want to set up appointment at this time and is going to pursue getting infusions at [place]. Patient was notified that if he would like to set up an appointment with us in the future - if he can't get set up at [place]- he would have to give us a call to schedule appointment. Patient understood instructions and has our number to call back if needed.

## 2024-07-31 ENCOUNTER — TELEMEDICINE (OUTPATIENT)
Dept: INTERNAL MEDICINE | Facility: CLINIC | Age: 49
End: 2024-07-31
Payer: COMMERCIAL

## 2024-07-31 ENCOUNTER — INFUSION (OUTPATIENT)
Dept: ONCOLOGY | Facility: HOSPITAL | Age: 49
End: 2024-07-31
Payer: COMMERCIAL

## 2024-07-31 ENCOUNTER — TRANSCRIBE ORDERS (OUTPATIENT)
Dept: ADMINISTRATIVE | Facility: HOSPITAL | Age: 49
End: 2024-07-31
Payer: COMMERCIAL

## 2024-07-31 VITALS — BODY MASS INDEX: 26.85 KG/M2 | HEIGHT: 72 IN

## 2024-07-31 VITALS
WEIGHT: 198 LBS | RESPIRATION RATE: 18 BRPM | OXYGEN SATURATION: 99 % | SYSTOLIC BLOOD PRESSURE: 102 MMHG | DIASTOLIC BLOOD PRESSURE: 62 MMHG | BODY MASS INDEX: 26.85 KG/M2 | TEMPERATURE: 97.8 F | HEART RATE: 65 BPM

## 2024-07-31 DIAGNOSIS — G62.9 NEUROPATHY: ICD-10-CM

## 2024-07-31 DIAGNOSIS — E61.1 IRON DEFICIENCY: ICD-10-CM

## 2024-07-31 DIAGNOSIS — F33.0 MILD EPISODE OF RECURRENT MAJOR DEPRESSIVE DISORDER: ICD-10-CM

## 2024-07-31 DIAGNOSIS — D51.0 PERNICIOUS ANEMIA: Primary | ICD-10-CM

## 2024-07-31 DIAGNOSIS — F41.9 ANXIETY: ICD-10-CM

## 2024-07-31 DIAGNOSIS — F43.10 POSTTRAUMATIC STRESS DISORDER: ICD-10-CM

## 2024-07-31 DIAGNOSIS — Z21 ASYMPTOMATIC HIV INFECTION, WITH NO HISTORY OF HIV-RELATED ILLNESS: ICD-10-CM

## 2024-07-31 DIAGNOSIS — Z21 ASYMPTOMATIC HUMAN IMMUNODEFICIENCY VIRUS (HIV) INFECTION STATUS: Primary | ICD-10-CM

## 2024-07-31 DIAGNOSIS — G62.0 DRUG-INDUCED POLYNEUROPATHY: ICD-10-CM

## 2024-07-31 DIAGNOSIS — R29.6 FALLING EPISODES: ICD-10-CM

## 2024-07-31 DIAGNOSIS — T45.4X5A: ICD-10-CM

## 2024-07-31 PROCEDURE — 25810000003 SODIUM CHLORIDE 0.9 % SOLUTION: Performed by: NURSE PRACTITIONER

## 2024-07-31 PROCEDURE — 99214 OFFICE O/P EST MOD 30 MIN: CPT | Performed by: NURSE PRACTITIONER

## 2024-07-31 PROCEDURE — 96375 TX/PRO/DX INJ NEW DRUG ADDON: CPT

## 2024-07-31 PROCEDURE — 96365 THER/PROPH/DIAG IV INF INIT: CPT

## 2024-07-31 PROCEDURE — 25010000002 FERUMOXYTOL 510 MG/17ML SOLUTION: Performed by: NURSE PRACTITIONER

## 2024-07-31 PROCEDURE — 63710000001 DIPHENHYDRAMINE PER 50 MG: Performed by: NURSE PRACTITIONER

## 2024-07-31 PROCEDURE — 96374 THER/PROPH/DIAG INJ IV PUSH: CPT

## 2024-07-31 RX ORDER — FAMOTIDINE 10 MG/ML
20 INJECTION, SOLUTION INTRAVENOUS ONCE
OUTPATIENT
Start: 2024-08-07 | End: 2024-08-07

## 2024-07-31 RX ORDER — CETIRIZINE HYDROCHLORIDE 10 MG/1
10 TABLET ORAL ONCE
OUTPATIENT
Start: 2024-08-07 | End: 2024-08-07

## 2024-07-31 RX ORDER — DIPHENHYDRAMINE HYDROCHLORIDE 50 MG/ML
50 INJECTION INTRAMUSCULAR; INTRAVENOUS AS NEEDED
OUTPATIENT
Start: 2024-08-07

## 2024-07-31 RX ORDER — SODIUM CHLORIDE 9 MG/ML
20 INJECTION, SOLUTION INTRAVENOUS ONCE
Status: COMPLETED | OUTPATIENT
Start: 2024-07-31 | End: 2024-07-31

## 2024-07-31 RX ORDER — DIPHENHYDRAMINE HCL 25 MG
25 CAPSULE ORAL ONCE
OUTPATIENT
Start: 2024-08-07 | End: 2024-08-07

## 2024-07-31 RX ORDER — FAMOTIDINE 10 MG/ML
20 INJECTION, SOLUTION INTRAVENOUS ONCE
Status: COMPLETED | OUTPATIENT
Start: 2024-07-31 | End: 2024-07-31

## 2024-07-31 RX ORDER — ACETAMINOPHEN 325 MG/1
650 TABLET ORAL ONCE
Status: COMPLETED | OUTPATIENT
Start: 2024-07-31 | End: 2024-07-31

## 2024-07-31 RX ORDER — ACETAMINOPHEN 325 MG/1
650 TABLET ORAL ONCE
OUTPATIENT
Start: 2024-08-07 | End: 2024-08-07

## 2024-07-31 RX ORDER — DIPHENHYDRAMINE HCL 25 MG
25 CAPSULE ORAL ONCE
Status: COMPLETED | OUTPATIENT
Start: 2024-07-31 | End: 2024-07-31

## 2024-07-31 RX ORDER — SODIUM CHLORIDE 9 MG/ML
20 INJECTION, SOLUTION INTRAVENOUS ONCE
OUTPATIENT
Start: 2024-08-07

## 2024-07-31 RX ORDER — CETIRIZINE HYDROCHLORIDE 10 MG/1
10 TABLET ORAL ONCE
Status: COMPLETED | OUTPATIENT
Start: 2024-07-31 | End: 2024-07-31

## 2024-07-31 RX ORDER — FAMOTIDINE 10 MG/ML
20 INJECTION, SOLUTION INTRAVENOUS AS NEEDED
OUTPATIENT
Start: 2024-08-07

## 2024-07-31 RX ADMIN — FERUMOXYTOL 510 MG: 510 INJECTION INTRAVENOUS at 11:40

## 2024-07-31 RX ADMIN — ACETAMINOPHEN 650 MG: 325 TABLET ORAL at 11:02

## 2024-07-31 RX ADMIN — DIPHENHYDRAMINE HYDROCHLORIDE 25 MG: 25 CAPSULE ORAL at 11:01

## 2024-07-31 RX ADMIN — FAMOTIDINE 20 MG: 10 INJECTION INTRAVENOUS at 11:02

## 2024-07-31 RX ADMIN — SODIUM CHLORIDE 20 ML/HR: 9 INJECTION, SOLUTION INTRAVENOUS at 11:02

## 2024-07-31 RX ADMIN — CETIRIZINE HYDROCHLORIDE 10 MG: 10 TABLET, FILM COATED ORAL at 11:02

## 2024-07-31 NOTE — PROGRESS NOTES
Mode of Visit: Video  Location of patient: other: car, pulled over in a parking lot  You have chosen to receive care through a telehealth visit.  Does the patient consent to use a video/audio connection for your medical care today? Yes  The visit included audio and video interaction. No technical issues occurred during this visit.    Date: 2024  Name: Fei Ojeda  : 1975         Chief Complaint:   Chief Complaint   Patient presents with    Peripheral Neuropathy    discuss iron         HPI:  Fei Ojeda is a 49 y.o. male presents for video visit in regard to numbness that is progressing down in his hand and arms. Falling often, loses balance easily.  Catches himself most of the time, lands on his knees.  Concerned he may not be able to work for very much longer. Becoming more difficult staying on task.  Things he had memorized are hard for him to remember now.  Workplace accomodation has been made, allows him to work from home at this time.  Peripheral polyneuropathy diagnosed with EMG in .  Taking duloxetine, gabapentin as prescribed.  Higher doses of cymbalta cause him to have a metallic taste in his mouth. Works well when used in conjunction with gabapentin.  Occasionally unable to work the day after taking gabapentin due to sedation.  He is unable to take lyrica, slept for 3 days after taking one dose.   Symptoms didn't start until after he was diagnosed with HIV.  Have been progressively worsening, will likely continue to progress.  Takes biktarvy at night, 2 hours apart from other medications.  Vital load well managed with biktarvy.   Depression, anxiety. Going to counseling and follows medication management, as well. Currently prescribed duloxetine 30 mg daily (for neuropathy), hydroxyzine 25 mg.  They have been hesitant to start new medication for anxiety or depression, due to potential interaction with Biktarvy.    Iron infusion scheduled today in [place].  Unable to tolerate oral  iron.        History: The following portions of the patient's history were reviewed and updated as appropriate: allergies, current medications, past medical history, family history, surgical history, social history and problem list.      ROS:  Review of Systems      PE:  Physical Exam   Constitutional: He appears well-developed and well-nourished. No distress.   HENT:   Head: Normocephalic.   Right Ear: External ear normal.   Left Ear: External ear normal.   Eyes: Pupils are equal, round, and reactive to light. Conjunctivae are normal.   Neck: Neck normal appearance.  Pulmonary/Chest: Effort normal.   Neurological: He is alert.   Oriented x 3   Skin: No pallor.   Psychiatric: He has a normal mood and affect. His speech is normal and behavior is normal. Thought content normal. His affect is normal.          Assessment/Plan:  Diagnoses and all orders for this visit:    1. Pernicious anemia (Primary)  2. Iron deficiency        - Continue with planned iron infusion.  This may reduce fatigue.     3. Mild episode of recurrent major depressive disorder  4. Anxiety  5. Posttraumatic stress disorder        - Continue counseling, taking medication as prescribed.         - Encouraged to take part in daily physical exercise.          - Eat healthy, well balanced diet; avoid sugary foods or beverages        - Continue to abstain from alcohol and drugs        - Ensure good night's sleep by creating calm space in bedroom, avoiding screen time 1-2 hours before bed, no caffeine after 5 pm        - Talk to supportive family and friends, as needed          6. Asymptomatic HIV infection, with no history of HIV-related illness         - Keep scheduled appointments with ID, continue Biktarvy as prescribed    7. Falling episodes        - Make position changes slowly. Discussed assistive devices that can be used in the home, at work    8. Drug-induced polyneuropathy        - Continue duloxetine, gabapentin as prescribed         Return for  Annual.  Patient was given instructions and counseling regarding her condition or for health maintenance advice. Please see specific information pulled into the AVS if appropriate.

## 2024-08-05 ENCOUNTER — PATIENT MESSAGE (OUTPATIENT)
Dept: INTERNAL MEDICINE | Facility: CLINIC | Age: 49
End: 2024-08-05
Payer: COMMERCIAL

## 2024-08-05 DIAGNOSIS — R79.89 LOW TESTOSTERONE IN MALE: Primary | ICD-10-CM

## 2024-08-06 ENCOUNTER — PATIENT MESSAGE (OUTPATIENT)
Dept: INTERNAL MEDICINE | Facility: CLINIC | Age: 49
End: 2024-08-06
Payer: COMMERCIAL

## 2024-08-06 DIAGNOSIS — R53.1 WEAKNESS: ICD-10-CM

## 2024-08-06 DIAGNOSIS — R53.83 DECREASED STAMINA: ICD-10-CM
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
## 2024-08-06 DIAGNOSIS — D51.0 PERNICIOUS ANEMIA: ICD-10-CM

## 2024-08-07 ENCOUNTER — INFUSION (OUTPATIENT)
Dept: ONCOLOGY | Facility: HOSPITAL | Age: 49
End: 2024-08-07
Payer: COMMERCIAL

## 2024-08-07 VITALS
SYSTOLIC BLOOD PRESSURE: 117 MMHG | TEMPERATURE: 98.4 F | HEART RATE: 63 BPM | RESPIRATION RATE: 18 BRPM | BODY MASS INDEX: 26.75 KG/M2 | OXYGEN SATURATION: 98 % | DIASTOLIC BLOOD PRESSURE: 73 MMHG | WEIGHT: 197.3 LBS

## 2024-08-07 DIAGNOSIS — E61.1 IRON DEFICIENCY: ICD-10-CM

## 2024-08-07 DIAGNOSIS — T45.4X5A: ICD-10-CM

## 2024-08-07 DIAGNOSIS — D51.0 PERNICIOUS ANEMIA: Primary | ICD-10-CM

## 2024-08-07 PROCEDURE — 63710000001 DIPHENHYDRAMINE PER 50 MG: Performed by: NURSE PRACTITIONER

## 2024-08-07 PROCEDURE — 96374 THER/PROPH/DIAG INJ IV PUSH: CPT

## 2024-08-07 PROCEDURE — 96375 TX/PRO/DX INJ NEW DRUG ADDON: CPT

## 2024-08-07 PROCEDURE — 25010000002 FERUMOXYTOL 510 MG/17ML SOLUTION: Performed by: NURSE PRACTITIONER

## 2024-08-07 PROCEDURE — 25810000003 SODIUM CHLORIDE 0.9 % SOLUTION: Performed by: NURSE PRACTITIONER

## 2024-08-07 RX ORDER — FAMOTIDINE 10 MG/ML
20 INJECTION, SOLUTION INTRAVENOUS ONCE
Status: COMPLETED | OUTPATIENT
Start: 2024-08-07 | End: 2024-08-07

## 2024-08-07 RX ORDER — FAMOTIDINE 10 MG/ML
20 INJECTION, SOLUTION INTRAVENOUS AS NEEDED
OUTPATIENT
Start: 2024-08-07

## 2024-08-07 RX ORDER — DIPHENHYDRAMINE HCL 25 MG
25 CAPSULE ORAL ONCE
Status: CANCELLED | OUTPATIENT
Start: 2024-08-07 | End: 2024-08-07

## 2024-08-07 RX ORDER — CETIRIZINE HYDROCHLORIDE 10 MG/1
10 TABLET ORAL ONCE
Status: COMPLETED | OUTPATIENT
Start: 2024-08-07 | End: 2024-08-07

## 2024-08-07 RX ORDER — DIPHENHYDRAMINE HYDROCHLORIDE 50 MG/ML
50 INJECTION INTRAMUSCULAR; INTRAVENOUS AS NEEDED
OUTPATIENT
Start: 2024-08-07

## 2024-08-07 RX ORDER — SODIUM CHLORIDE 9 MG/ML
20 INJECTION, SOLUTION INTRAVENOUS ONCE
Status: COMPLETED | OUTPATIENT
Start: 2024-08-07 | End: 2024-08-07

## 2024-08-07 RX ORDER — ACETAMINOPHEN 325 MG/1
650 TABLET ORAL ONCE
OUTPATIENT
Start: 2024-08-07 | End: 2024-08-07

## 2024-08-07 RX ORDER — FAMOTIDINE 10 MG/ML
20 INJECTION, SOLUTION INTRAVENOUS ONCE
Status: CANCELLED | OUTPATIENT
Start: 2024-08-07 | End: 2024-08-07

## 2024-08-07 RX ORDER — DIPHENHYDRAMINE HCL 25 MG
25 CAPSULE ORAL ONCE
Status: COMPLETED | OUTPATIENT
Start: 2024-08-07 | End: 2024-08-07

## 2024-08-07 RX ORDER — SODIUM CHLORIDE 9 MG/ML
20 INJECTION, SOLUTION INTRAVENOUS ONCE
Status: CANCELLED | OUTPATIENT
Start: 2024-08-07

## 2024-08-07 RX ORDER — CETIRIZINE HYDROCHLORIDE 10 MG/1
10 TABLET ORAL ONCE
Status: CANCELLED | OUTPATIENT
Start: 2024-08-07 | End: 2024-08-07

## 2024-08-07 RX ADMIN — DIPHENHYDRAMINE HYDROCHLORIDE 25 MG: 25 CAPSULE ORAL at 14:46

## 2024-08-07 RX ADMIN — FERUMOXYTOL 510 MG: 510 INJECTION INTRAVENOUS at 15:01

## 2024-08-07 RX ADMIN — CETIRIZINE HYDROCHLORIDE 10 MG: 10 TABLET, FILM COATED ORAL at 14:45

## 2024-08-07 RX ADMIN — SODIUM CHLORIDE 20 ML/HR: 9 INJECTION, SOLUTION INTRAVENOUS at 15:01

## 2024-08-07 RX ADMIN — FAMOTIDINE 20 MG: 10 INJECTION INTRAVENOUS at 14:46

## 2024-08-07 NOTE — TELEPHONE ENCOUNTER
From: Jessica Garcia  To: Fei Ojeda  Sent: 8/5/2024 7:37 AM EDT  Subject: Urology referral.    Referrals has let me know there is not a Urologist in [place] who will accept my referral. They have also reminded me that your testosterone level will need to be redrawn, close to 8 am, if you would like to be seen by [facility] either here or in [place].   Let me know if you have a preferred provider in mind so I can send the referral to them and (possibly) send lab orders.

## 2024-08-13 NOTE — TELEPHONE ENCOUNTER
From: Fei Ojeda  To: Jessica Garcia  Sent: 8/6/2024 12:00 PM EDT  Subject: Order for shower chair    Mattie:    I fell again in the shower this morning. Will you order me a shower chair so that I can get one. If you will put the order in [employer] print it that would be helpful. Thank you.

## 2024-08-24 ENCOUNTER — HOSPITAL ENCOUNTER (OUTPATIENT)
Dept: MRI IMAGING | Facility: HOSPITAL | Age: 49
Discharge: HOME | End: 2024-08-24
Payer: COMMERCIAL

## 2024-08-24 ENCOUNTER — LAB (OUTPATIENT)
Dept: LAB | Facility: HOSPITAL | Age: 49
End: 2024-08-24
Payer: COMMERCIAL

## 2024-08-24 DIAGNOSIS — G62.9 NEUROPATHY: ICD-10-CM

## 2024-08-24 DIAGNOSIS — Z21 ASYMPTOMATIC HUMAN IMMUNODEFICIENCY VIRUS (HIV) INFECTION STATUS: ICD-10-CM

## 2024-08-24 PROCEDURE — 83540 ASSAY OF IRON: CPT | Performed by: NURSE PRACTITIONER

## 2024-08-24 PROCEDURE — 83550 IRON BINDING TEST: CPT | Performed by: NURSE PRACTITIONER

## 2024-08-24 PROCEDURE — 85025 COMPLETE CBC W/AUTO DIFF WBC: CPT | Performed by: NURSE PRACTITIONER

## 2024-08-24 PROCEDURE — A9577 INJ MULTIHANCE: HCPCS | Performed by: INTERNAL MEDICINE

## 2024-08-24 PROCEDURE — 72158 MRI LUMBAR SPINE W/O & W/DYE: CPT

## 2024-08-24 PROCEDURE — 0 GADOBENATE DIMEGLUMINE 529 MG/ML SOLUTION: Performed by: INTERNAL MEDICINE

## 2024-08-24 RX ADMIN — GADOBENATE DIMEGLUMINE 17 ML: 529 INJECTION, SOLUTION INTRAVENOUS at 11:00

## 2024-08-26 LAB
BASOPHILS # BLD AUTO: 0.1 X10E3/UL (ref 0–0.2)
BASOPHILS NFR BLD AUTO: 1 %
EOSINOPHIL # BLD AUTO: 0.1 X10E3/UL (ref 0–0.4)
EOSINOPHIL NFR BLD AUTO: 2 %
ERYTHROCYTE [DISTWIDTH] IN BLOOD BY AUTOMATED COUNT: 16.5 % (ref 11.6–15.4)
HCT VFR BLD AUTO: 45.8 % (ref 37.5–51)
HGB BLD-MCNC: 14.4 G/DL (ref 13–17.7)
IMM GRANULOCYTES # BLD AUTO: 0 X10E3/UL (ref 0–0.1)
IMM GRANULOCYTES NFR BLD AUTO: 1 %
IRON SATN MFR SERPL: 38 % (ref 15–55)
IRON SERPL-MCNC: 107 UG/DL (ref 38–169)
LYMPHOCYTES # BLD AUTO: 2.3 X10E3/UL (ref 0.7–3.1)
LYMPHOCYTES NFR BLD AUTO: 34 %
MCH RBC QN AUTO: 29.1 PG (ref 26.6–33)
MCHC RBC AUTO-ENTMCNC: 31.4 G/DL (ref 31.5–35.7)
MCV RBC AUTO: 93 FL (ref 79–97)
MONOCYTES # BLD AUTO: 0.8 X10E3/UL (ref 0.1–0.9)
MONOCYTES NFR BLD AUTO: 12 %
NEUTROPHILS # BLD AUTO: 3.4 X10E3/UL (ref 1.4–7)
NEUTROPHILS NFR BLD AUTO: 50 %
PLATELET # BLD AUTO: 343 X10E3/UL (ref 150–450)
RBC # BLD AUTO: 4.95 X10E6/UL (ref 4.14–5.8)
TIBC SERPL-MCNC: 284 UG/DL (ref 250–450)
UIBC SERPL-MCNC: 177 UG/DL (ref 111–343)
WBC # BLD AUTO: 6.6 X10E3/UL (ref 3.4–10.8)

## 2024-09-03 ENCOUNTER — OFFICE VISIT (OUTPATIENT)
Dept: INTERNAL MEDICINE | Facility: CLINIC | Age: 49
End: 2024-09-03
Payer: COMMERCIAL

## 2024-09-03 VITALS
DIASTOLIC BLOOD PRESSURE: 70 MMHG | WEIGHT: 200 LBS | TEMPERATURE: 98.2 F | BODY MASS INDEX: 27.09 KG/M2 | OXYGEN SATURATION: 98 % | HEIGHT: 72 IN | SYSTOLIC BLOOD PRESSURE: 110 MMHG | HEART RATE: 76 BPM

## 2024-09-03 DIAGNOSIS — Z21 ASYMPTOMATIC HIV INFECTION, WITH NO HISTORY OF HIV-RELATED ILLNESS: ICD-10-CM

## 2024-09-03 DIAGNOSIS — R76.8 THYROID ANTIBODY POSITIVE: ICD-10-CM

## 2024-09-03 DIAGNOSIS — H69.93 DYSFUNCTION OF BOTH EUSTACHIAN TUBES: ICD-10-CM

## 2024-09-03 DIAGNOSIS — J30.9 CHRONIC ALLERGIC RHINITIS: ICD-10-CM

## 2024-09-03 DIAGNOSIS — F43.10 POSTTRAUMATIC STRESS DISORDER: ICD-10-CM

## 2024-09-03 DIAGNOSIS — G62.0 DRUG-INDUCED POLYNEUROPATHY: ICD-10-CM

## 2024-09-03 DIAGNOSIS — Z13.0 SCREENING FOR ENDOCRINE, METABOLIC AND IMMUNITY DISORDER: ICD-10-CM

## 2024-09-03 DIAGNOSIS — Z13.29 SCREENING FOR ENDOCRINE, METABOLIC AND IMMUNITY DISORDER: ICD-10-CM

## 2024-09-03 DIAGNOSIS — E78.5 ELEVATED LIPIDS: ICD-10-CM

## 2024-09-03 DIAGNOSIS — D51.0 PERNICIOUS ANEMIA: ICD-10-CM
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
## 2024-09-03 DIAGNOSIS — F33.0 MILD EPISODE OF RECURRENT MAJOR DEPRESSIVE DISORDER: ICD-10-CM

## 2024-09-03 DIAGNOSIS — Z91.89 AT RISK FOR LOSS OF BONE DENSITY: ICD-10-CM

## 2024-09-03 DIAGNOSIS — Z13.228 SCREENING FOR ENDOCRINE, METABOLIC AND IMMUNITY DISORDER: ICD-10-CM

## 2024-09-03 DIAGNOSIS — E61.1 IRON DEFICIENCY: ICD-10-CM

## 2024-09-03 DIAGNOSIS — F41.9 ANXIETY: ICD-10-CM

## 2024-09-03 DIAGNOSIS — E55.9 VITAMIN D DEFICIENCY: ICD-10-CM

## 2024-09-03 DIAGNOSIS — R29.6 FALLING EPISODES: ICD-10-CM

## 2024-09-03 DIAGNOSIS — M79.7 FIBROMYALGIA: ICD-10-CM

## 2024-09-03 DIAGNOSIS — Z00.00 ANNUAL PHYSICAL EXAM: Primary | ICD-10-CM

## 2024-09-03 PROCEDURE — 99396 PREV VISIT EST AGE 40-64: CPT | Performed by: NURSE PRACTITIONER

## 2024-09-03 RX ORDER — DULOXETIN HYDROCHLORIDE 30 MG/1
30 CAPSULE, DELAYED RELEASE ORAL 2 TIMES DAILY
Start: 2024-09-03

## 2024-09-03 NOTE — PROGRESS NOTES
Chief Complaint   Patient presents with    Annual Exam     History of Present Illness  Fei Ojeda is a 49 y.o. male and is here for a comprehensive physical exam.     Weakness, particularly when standing up due to peripheral neuropathy due to Biktarvy.  He has been using a walker on flat surfaces and a four-pronged cane on gravel surfaces for about a year to prevent falls. Despite these measures, he has experienced approximately 10 falls in the past year due to his peripheral polyneuropathy. He often loses balance when transitioning from sitting to standing, especially after prolonged sitting which causes leg swelling. His home has been modified for easier navigation, but he fears falling and injuring himself in public without his assistive devices. He uses a shopping cart for stability during grocery shopping, with his sister often accompanying him. He requests a note for insurance coverage for a shower chair.  He has fallen in the shower before.  Home has a walk in shower with grab bars in place.      He experiences lower back pain and wonders if it could be related to his bulging disc. He finds that moving his legs can sometimes help, but prolonged sitting can only be relieved by lying down for 20 to 30 minutes. His pain is sometimes so severe that it distracts him from simple tasks. He is currently taking Cymbalta and gabapentin for pain management. He is sensitive to sedative medications and cannot function on the increased dose of gabapentin (300 mg three times a day) suggested by his neurologist. He takes gabapentin at night as daytime use, even at 100 mg, makes him drowsy. The HIV clinic recently increased his Cymbalta dosage to 30 mg twice daily. He has been on a steady dose of 30 mg of Cymbalta for over a year, but any increase results in a metallic taste in his mouth. He was unable to take amitriptyline due to its interaction with Biktarvy. He has tried Lyrica and Depakote for neuropathy, but they  caused excessive sleepiness and worsened his lower extremity swelling, respectively. He performs daily exercises recommended by his physical therapist, including sit-to-stands and using resistance band between legs. He plans to resume therapy in January 2025 when insurance coverage for PT resets. He recently had an MRI ordered by the HIV clinic and is curious about the results.    He feels better after his iron infusion, although he still has low energy. He never had vitamin or B12 deficiencies before his HIV diagnosis and is not diabetic. He also experiences drowsiness with Benadryl, which he takes for iron.    He occasionally feels overwhelmed and continues to experience panic attacks. He takes hydroxyzine as needed for anxiety, which causes mild sedation but helps calm him during panic attacks. He has tried BuSpar which was not effective.  Follows medication management doctor & counselor in [place]. He has discussed trying Celexa or Lexapro, but was advised against it due to potential interactions with Biktarvy. He is considering injectable medication. Denies anhedonia, feelings of worthlessness, difficulty concentrating, impaired memory, SI, HI, panic attacks.     Has gained weight without change in activity, diet.  Positive thyroid antibodies earlier this year, normal US thyroid and thyroid function (per labs).       History:  Sexually active occasionally, uses condoms.  STI testing regularly done at ID clinic  Erectile dysfunction:  no  Nocturia:  yes    Do you take any herbs or supplements that were not prescribed by a doctor? no    Health Habits:  Dental Exam. up to date, brushes teeth 2-3 x a day   Eye Exam. up to date, wears glasses.  Has astigmatism, occasionally wears contacts  Exercise: 0 times/week.  Current exercise activities include: none    Health Maintenance   Topic Date Due    DXA SCAN  08/09/2024    COVID-19 Vaccine (4 - 2023-24 season) 11/23/2024 (Originally 9/1/2024)    INFLUENZA VACCINE   2025 (Originally 2024)    Hepatitis B (2 of 3 - 19+ 3-dose series) 2025 (Originally 2/15/2000)    TDAP/TD VACCINES (2 - Tdap) 2025 (Originally 2004)    BMI FOLLOWUP  2024    LIPID PANEL  2025    ANNUAL PHYSICAL  2025    COLORECTAL CANCER SCREENING  2032    HEPATITIS C SCREENING  Completed    Orthopox/Monkeypox  Completed    Pneumococcal Vaccine 0-64  Completed       PMH, PSH, SocHx, FamHx, Allergies, and Medications: Reviewed and updated in the Visit Navigator.     Allergies   Allergen Reactions    Azithromycin GI Intolerance    Lyrica [Pregabalin] Other (See Comments)     Sedation, slept for 3 days after one dose    Erythromycin GI Intolerance and Diarrhea     erythromycin - Gastrointestinal upset     Past Medical History:   Diagnosis Date    Acid reflux     Allergic     Most of my life    Anemia     Anxiety     Depression     Fibromyalgia, primary 2017    Newly diagnosed last year    History of medical problems     HIV disease 2102    Hyperlipidemia     Intolerance, drug     Iron deficiency     Numbness and tingling     Osteopenia     Trigeminal neuralgia      Past Surgical History:   Procedure Laterality Date    VASCULAR SURGERY Right 2021    VASCULAR SURGERY Left     WISDOM TOOTH EXTRACTION       Social History     Socioeconomic History    Marital status: Single   Tobacco Use    Smoking status: Former     Current packs/day: 0.00     Average packs/day: 2.0 packs/day for 7.0 years (14.0 ttl pk-yrs)     Types: Cigarettes     Start date: 2010     Quit date: 2017     Years since quittin.6     Passive exposure: Past    Smokeless tobacco: Never   Vaping Use    Vaping status: Never Used   Substance and Sexual Activity    Alcohol use: No    Drug use: No    Sexual activity: Yes     Partners: Male     Birth control/protection: Condom     Family History   Problem Relation Age of Onset    Hypertension Mother     Diabetes Mother     Hyperlipidemia  "Mother     Arthritis Mother     Obesity Mother     Osteoporosis Mother     Thyroid disease Mother     COPD Father     Asthma Maternal Grandmother     Stroke Paternal Grandmother     Diabetes Paternal Grandmother        Review of Systems  Review of Systems   All other systems reviewed and are negative.      Objective   /70   Pulse 76   Temp 98.2 °F (36.8 °C)   Ht 182.9 cm (72.01\")   Wt 90.7 kg (200 lb)   SpO2 98%   BMI 27.12 kg/m²   Body mass index is 27.12 kg/m².         Physical Exam  Constitutional:       Appearance: He is well-developed. He is not ill-appearing.      Comments: Using walker for ambulation, uses counter and exam table for support when transitioning from chair to table   HENT:      Head: Normocephalic.      Right Ear: Tympanic membrane, ear canal and external ear normal.      Left Ear: Tympanic membrane, ear canal and external ear normal.      Nose: Nose normal.      Mouth/Throat:      Mouth: Mucous membranes are moist.      Pharynx: Oropharynx is clear. Uvula midline.   Eyes:      Extraocular Movements: Extraocular movements intact.      Conjunctiva/sclera: Conjunctivae normal.      Pupils: Pupils are equal, round, and reactive to light.   Neck:      Thyroid: No thyromegaly.   Cardiovascular:      Rate and Rhythm: Normal rate and regular rhythm.      Pulses:           Radial pulses are 2+ on the right side and 2+ on the left side.        Dorsalis pedis pulses are 2+ on the right side and 2+ on the left side.      Heart sounds: Normal heart sounds.   Pulmonary:      Effort: Pulmonary effort is normal.      Breath sounds: Normal breath sounds.   Abdominal:      General: Bowel sounds are normal.      Palpations: Abdomen is soft.      Tenderness: There is no abdominal tenderness.   Musculoskeletal:         General: No tenderness or deformity. Normal range of motion.      Cervical back: Full passive range of motion without pain, normal range of motion and neck supple.      Right lower leg: " No edema.      Left lower leg: No edema.   Lymphadenopathy:      Cervical: No cervical adenopathy.   Skin:     General: Skin is warm.      Capillary Refill: Capillary refill takes less than 2 seconds.   Neurological:      Mental Status: He is alert and oriented to person, place, and time.      Sensory: No sensory deficit.      Coordination: Coordination normal.      Gait: Gait normal.      Comments: CN II-XII normal   Psychiatric:         Attention and Perception: Attention normal.         Mood and Affect: Mood and affect normal.         Speech: Speech normal.         Behavior: Behavior normal.         Thought Content: Thought content normal.         The 10-year CVD risk score (Rebecca, et al., 2008) is: 5%    Values used to calculate the score:      Age: 49 years      Sex: Male      Diabetic: No      Tobacco smoker: No      Systolic Blood Pressure: 110 mmHg      Is BP treated: No      HDL Cholesterol: 71 mg/dL      Total Cholesterol: 232 mg/dL    Assessment & Plan   1. Healthy male exam.    2. Patient Counseling: Including but not Limited to the following, when appropriate:  --Nutrition: Stressed importance of moderation in sodium/caffeine intake, saturated fat and cholesterol, caloric balance, sufficient intake of fresh fruits, vegetables, fiber  --Exercise: Stressed the importance of regular exercise.   --Substance Abuse: Discussed cessation/primary prevention of tobacco, alcohol, or other drug use; driving or other dangerous activities under the influence; availability of treatment for abuse, as indicated based on social history.    --Sexuality: Discussed sexually transmitted diseases, partner selection, use of condoms and contraceptive alternatives.   --Injury prevention: Discussed safety belts, safety helmets, smoke detector, smoking near bedding or upholstery.   --Dental health: Discussed importance of regular tooth brushing, flossing, and dental visits.  --Immunizations reviewed.  --Discussed benefits of  colon cancer screening.  3. Discussed the patient's BMI with him.  The BMI is above average; BMI management plan is completed  4. Return in about 1 year (around 9/3/2025) for Annual.  5. Age-appropriate Screening Scheduled    Assessment & Plan     Diagnoses and all orders for this visit:    1. Annual physical exam (Primary)    2. Fall in shower  -     Miscellaneous DME - shower chair     3. Falling episodes  -     Miscellaneous DME.  Continue using walker and cane.  Use shower chair when bathing.     4. At risk for loss of bone density  -     DEXA Assess Fracture Vertebral    5. Dysfunction of both eustachian tubes         - Diver's maneuver several times a day     6. Chronic allergic rhinitis        - Consider daily antihistamine during peak allergy season(s)    7. Anxiety  8. Posttraumatic stress disorder  9. Mild episode of recurrent major depressive disorder        - Encouraged to take part in daily physical exercise.          - Eat healthy, well balanced diet; avoid sugary foods or beverages        - Continue to abstain from alcohol and drugs         - Ensure good night's sleep by creating calm space in bedroom, avoiding screen time 1-2 hours before bed, no caffeine after 5 pm        - Talk to supportive family and friends, as needed    10. Fibromyalgia    11. Elevated lipids    12. Iron deficiency  13. Pernicious anemia    14. Asymptomatic HIV infection    15. Drug-induced polyneuropathy         - Continue gabapentin as prescribed          -    DULoxetine (CYMBALTA) 30 MG capsule; Take 1 capsule by mouth 2 (Two) Times a Day.    16. Vitamin D deficiency  -     Vitamin D,25-Hydroxy    17. Thyroid antibody positive  -     T4, Free  -     TSH    18. Screening for endocrine, metabolic and immunity disorder  -     Comprehensive Metabolic Panel

## 2024-09-04 LAB
25(OH)D3+25(OH)D2 SERPL-MCNC: 39.6 NG/ML (ref 30–100)
ALBUMIN SERPL-MCNC: 4.7 G/DL (ref 3.5–5.2)
ALBUMIN/GLOB SERPL: 1.8 G/DL
ALP SERPL-CCNC: 61 U/L (ref 39–117)
ALT SERPL-CCNC: 33 U/L (ref 1–41)
AST SERPL-CCNC: 30 U/L (ref 1–40)
BILIRUB SERPL-MCNC: <0.2 MG/DL (ref 0–1.2)
BUN SERPL-MCNC: 16 MG/DL (ref 6–20)
BUN/CREAT SERPL: 15.1 (ref 7–25)
CALCIUM SERPL-MCNC: 9.6 MG/DL (ref 8.6–10.5)
CHLORIDE SERPL-SCNC: 99 MMOL/L (ref 98–107)
CO2 SERPL-SCNC: 28.9 MMOL/L (ref 22–29)
CREAT SERPL-MCNC: 1.06 MG/DL (ref 0.76–1.27)
EGFRCR SERPLBLD CKD-EPI 2021: 86 ML/MIN/1.73
GLOBULIN SER CALC-MCNC: 2.6 GM/DL
GLUCOSE SERPL-MCNC: 93 MG/DL (ref 65–99)
POTASSIUM SERPL-SCNC: 4.5 MMOL/L (ref 3.5–5.2)
PROT SERPL-MCNC: 7.3 G/DL (ref 6–8.5)
SODIUM SERPL-SCNC: 139 MMOL/L (ref 136–145)
T4 FREE SERPL-MCNC: 1.18 NG/DL (ref 0.92–1.68)
TSH SERPL DL<=0.005 MIU/L-ACNC: 2.73 UIU/ML (ref 0.27–4.2)

## 2024-09-04 NOTE — PROGRESS NOTES
Vitamin D is in normal range, but on the low side.  Recommend he continue taking weekly supplement.  Will send refills when needed.   Otherwise, labs are normal.

## 2024-10-07 ENCOUNTER — HOSPITAL ENCOUNTER (OUTPATIENT)
Dept: BONE DENSITY | Facility: HOSPITAL | Age: 49
Discharge: HOME | End: 2024-10-07
Admitting: NURSE PRACTITIONER
Payer: COMMERCIAL

## 2024-10-07 PROCEDURE — 77086 VRT FRACTURE ASSMT VIA DXA: CPT

## 2024-10-16 ENCOUNTER — PATIENT MESSAGE (OUTPATIENT)
Dept: INTERNAL MEDICINE | Facility: CLINIC | Age: 49
End: 2024-10-16
Payer: COMMERCIAL

## 2024-10-18 RX ORDER — ACYCLOVIR 50 MG/G
OINTMENT TOPICAL EVERY 4 HOURS PRN
Qty: 30 G | Refills: 1 | Status: SHIPPED | OUTPATIENT
Start: 2024-10-18

## 2024-10-18 RX ORDER — CYANOCOBALAMIN 1000 UG/ML
1000 INJECTION, SOLUTION INTRAMUSCULAR; SUBCUTANEOUS
Qty: 6 ML | Refills: 1 | Status: SHIPPED | OUTPATIENT
Start: 2024-10-18

## 2024-11-12 ENCOUNTER — OFFICE VISIT (OUTPATIENT)
Dept: INTERNAL MEDICINE | Facility: CLINIC | Age: 49
End: 2024-11-12
Payer: COMMERCIAL

## 2024-11-12 VITALS
SYSTOLIC BLOOD PRESSURE: 118 MMHG | DIASTOLIC BLOOD PRESSURE: 70 MMHG | OXYGEN SATURATION: 96 % | HEIGHT: 72 IN | BODY MASS INDEX: 27.77 KG/M2 | HEART RATE: 65 BPM | RESPIRATION RATE: 18 BRPM | WEIGHT: 205 LBS | TEMPERATURE: 97.6 F

## 2024-11-12 DIAGNOSIS — F32.1 CURRENT MODERATE EPISODE OF MAJOR DEPRESSIVE DISORDER, UNSPECIFIED WHETHER RECURRENT: ICD-10-CM

## 2024-11-12 DIAGNOSIS — K13.70 ORAL LESION: Primary | ICD-10-CM

## 2024-11-12 DIAGNOSIS — D51.0 PERNICIOUS ANEMIA: ICD-10-CM

## 2024-11-12 DIAGNOSIS — M54.50 MIDLINE LOW BACK PAIN WITHOUT SCIATICA, UNSPECIFIED CHRONICITY: ICD-10-CM

## 2024-11-12 DIAGNOSIS — F41.9 ANXIETY: ICD-10-CM

## 2024-11-12 DIAGNOSIS — F43.10 POSTTRAUMATIC STRESS DISORDER: ICD-10-CM

## 2024-11-12 DIAGNOSIS — Z21 ASYMPTOMATIC HIV INFECTION, WITH NO HISTORY OF HIV-RELATED ILLNESS: ICD-10-CM

## 2024-11-12 PROCEDURE — 99214 OFFICE O/P EST MOD 30 MIN: CPT | Performed by: NURSE PRACTITIONER

## 2024-11-12 RX ORDER — IBUPROFEN 800 MG/1
800 TABLET, FILM COATED ORAL EVERY 6 HOURS PRN
Qty: 120 TABLET | Refills: 1 | Status: SHIPPED | OUTPATIENT
Start: 2024-11-12

## 2024-11-12 RX ORDER — SIMETHICONE 40MG/0.6ML
1 SUSPENSION, DROPS(FINAL DOSAGE FORM)(ML) ORAL EVERY 6 HOURS
Qty: 355 ML | Refills: 1 | Status: SHIPPED | OUTPATIENT
Start: 2024-11-12

## 2024-11-12 RX ORDER — NYSTATIN 100000 [USP'U]/ML
500000 SUSPENSION ORAL 4 TIMES DAILY
Qty: 473 ML | Refills: 1 | Status: SHIPPED | OUTPATIENT
Start: 2024-11-12

## 2024-11-12 NOTE — PROGRESS NOTES
Office Visit      Patient Name: Fei Ojeda  : 1975   MRN: [id number]     Chief Complaint:    Chief Complaint   Patient presents with    Back Pain     Lower back pain f/u       History of Present Illness: Fei Ojeda is a 49 y.o. male who is here today with reports of lower back pain that persists.  He has gone to PT in the past, feels it was helpful.  Continues to fall frequently despite using walker in the home, when he is out.  Using walker today. Weakness, neuropathy are progressing. Believed to be due to HIV medication, Biktarvy.  Taking gabapentin for neuropathy, causes sedation when he takes more than 100 mg HS.  Also taking duloxetine 30 mg BID, unable to tolerate increase to 60 mg BID.  Taking ibuprofen when needed for back pain.    He has asked to go to part time at work, suffers from chronic fatigue.  Request was denied.  Works from home. He has modified his work station as much as he can within his budget to reduce back pain.  Has not made a difference.    Pernicious anemia.  Received iron infusion, helped with fatigue for a short time.    Has developed oral irritation.  Not cold sores or fever blisters.    PTSD, anxiety and depression.  Taking cymbalta as prescribed. Hydroxyzine 25 mg TID prn anxiety, insomnia, itching.  Goes to counseling biweekly. No reported anhedonia, difficulty concentrating, impaired memory, SI, HI, panic attacks, weight change.      Subjective      I have reviewed and the following portions of the patient's history were updated as appropriate: past family history, past medical history, past social history, past surgical history and problem list.      Current Outpatient Medications:     acyclovir (ZOVIRAX) 5 % ointment, Apply  topically to the appropriate area as directed Every 4 (Four) Hours As Needed (fever blisters)., Disp: 30 g, Rfl: 1    Bictegravir-Emtricitab-Tenofov (BIKTARVY) -25 MG per tablet, Take 1 tablet by mouth Daily., Disp: , Rfl:      "calcium carbonate (OS-ADELSO) 600 MG tablet, Take 1 tablet by mouth Daily., Disp: , Rfl:     cyanocobalamin 1000 MCG/ML injection, Inject 1 mL into the appropriate muscle as directed by prescriber Every 30 (Thirty) Days., Disp: 6 mL, Rfl: 1    DULoxetine (CYMBALTA) 30 MG capsule, Take 1 capsule by mouth 2 (Two) Times a Day., Disp: , Rfl:     gabapentin (NEURONTIN) 100 MG capsule, Take 1 capsule by mouth., Disp: , Rfl:     glucose monitor monitoring kit, 1 each As Needed (bid). Test BID please provide what insurance covers, Disp: 1 each, Rfl: 1    hydrOXYzine pamoate (VISTARIL) 25 MG capsule, Take 1 capsule by mouth., Disp: , Rfl:     ibuprofen (ADVIL,MOTRIN) 800 MG tablet, Take 1 tablet by mouth Every 6 (Six) Hours As Needed for Mild Pain., Disp: 120 tablet, Rfl: 1    Lancets (OneTouch Delica Plus Cichow24B) misc, , Disp: , Rfl:     OneTouch Ultra test strip, , Disp: , Rfl:     vitamin D (ERGOCALCIFEROL) 1.25 MG (52144 UT) capsule capsule, Take 1 capsule by mouth 1 (One) Time Per Week., Disp: 12 capsule, Rfl: 3    Adelso Carb-Mag Hydrox-Simeth (Mylanta Coat & Cool) 1200-270-80 MG/10ML suspension, Take 1 Application by mouth Every 6 (Six) Hours., Disp: 355 mL, Rfl: 1    nystatin (MYCOSTATIN) 100,000 unit/mL suspension, Swish and swallow 5 mL 4 (Four) Times a Day., Disp: 473 mL, Rfl: 1    Allergies   Allergen Reactions    Azithromycin GI Intolerance    Lyrica [Pregabalin] Other (See Comments)     Sedation, slept for 3 days after one dose    Erythromycin GI Intolerance and Diarrhea     erythromycin - Gastrointestinal upset       Objective     Physical Exam:  Vital Signs:   Vitals:    11/12/24 1300   BP: 118/70   Pulse: 65   Resp: 18   Temp: 97.6 °F (36.4 °C)   TempSrc: Temporal   SpO2: 96%   Weight: 93 kg (205 lb)   Height: 182.9 cm (72.01\")     Body mass index is 27.8 kg/m².         Physical Exam  Constitutional:       Appearance: He is ill-appearing.   HENT:      Head: Normocephalic.      Right Ear: External ear normal.    "   Left Ear: External ear normal.      Mouth/Throat:      Mouth: Mucous membranes are moist.      Tongue: No lesions.      Pharynx: Posterior oropharyngeal erythema present.   Eyes:      Conjunctiva/sclera: Conjunctivae normal.      Pupils: Pupils are equal, round, and reactive to light.   Cardiovascular:      Rate and Rhythm: Normal rate and regular rhythm.      Pulses:           Radial pulses are 2+ on the right side and 2+ on the left side.        Dorsalis pedis pulses are 2+ on the right side and 2+ on the left side.      Heart sounds: Normal heart sounds.   Pulmonary:      Effort: Pulmonary effort is normal.      Breath sounds: Normal breath sounds.   Musculoskeletal:      Cervical back: Normal range of motion and neck supple.   Skin:     General: Skin is warm.      Capillary Refill: Capillary refill takes less than 2 seconds.   Neurological:      Mental Status: He is alert and oriented to person, place, and time.      Coordination: Coordination normal.      Gait: Gait normal.   Psychiatric:         Attention and Perception: Attention normal.         Mood and Affect: Mood and affect normal.         Speech: Speech normal.         Behavior: Behavior normal.             Assessment / Plan      Assessment/Plan:   Diagnoses and all orders for this visit:    1. Oral lesion (Primary)  -     Adelso Carb-Mag Hydrox-Simeth (Mylanta Coat & Cool) 1200-270-80 MG/10ML suspension; Take 1 Application by mouth Every 6 (Six) Hours.  Dispense: 355 mL; Refill: 1  -     nystatin (MYCOSTATIN) 100,000 unit/mL suspension; Swish and swallow 5 mL 4 (Four) Times a Day.  Dispense: 473 mL; Refill: 1    2. Midline low back pain without sciatica, unspecified chronicity  -     ibuprofen (ADVIL,MOTRIN) 800 MG tablet; Take 1 tablet by mouth Every 6 (Six) Hours As Needed for Mild Pain.  Dispense: 120 tablet; Refill: 1    3. Pernicious anemia       - Iron rich diet     4. Asymptomatic HIV infection, with no history of HIV-related illness         - Continue Biktarvy as prescribed by ID     5. Posttraumatic stress disorder  6. Current moderate episode of major depressive disorder, unspecified whether recurrent  7. Anxiety        - Encouraged to take part in daily physical exercise.          - Eat healthy, well balanced diet; avoid sugary foods or beverages        - Continue to abstain from alcohol and drugs         - Ensure good night's sleep by creating calm space in bedroom, avoiding screen time 1-2 hours before bed, no caffeine after 5 pm        - Talk to supportive family and friends, as needed        - Consider journaling, other creative way to express feelings, if needed             Follow Up:   Return if symptoms worsen or fail to improve.    Patient was given instructions and counseling regarding his condition or for health maintenance advice. Please see specific information pulled into the AVS if appropriate.       [facility] [address] [facility]     Please note that portions of this note may have been completed with a voice recognition program. Efforts were made to edit dictation, but occasionally words are mistranscribed.

## 2024-11-19 NOTE — TELEPHONE ENCOUNTER
"  Caller: Fei Ojeda \"James\"    Relationship: Self    Best call back number: [phone]     What was the call regarding: PATIENT STATES THE IRON INFUSION IS BEING DENIED BECAUSE PATIENT DOES NOT HAVE IRON DEFICIENCY ANEMIA PER INSURANCE AND PATIENT WOULD LIKE TO KNOW WHAT OPTIONS HE HAS. PATIENT STATES HE CANNOT TAKE ORAL IRON SUPPLEMENTS      "
Please advise.  
Yelena from [place] called along with Mr. Ojeda and states that the order for the iron infusion needs to be resubmitted with an iron deficiency diagnosis.  She states that the note needs to give as much supporting documentation as possible.  Yelena states that she will call back tomorrow morning to make sure this has been done.  Please advise. Phone number verified for Fei.  
Verbal/written post procedure instructions were given to patient/caregiver./Instructed patient/caregiver to follow-up with primary care physician./Instructed patient/caregiver regarding signs and symptoms of infection./Keep the cast/splint/dressing clean and dry.

## 2024-11-27 NOTE — PROGRESS NOTES
Subjective     Date: 12/9/2024    Referring Provider  ASHWINI Recinos    Chief Complaint  Pernicious anemia     Subjective          Fei Ojeda is a 49 y.o. male who presents today to [facility] for initial evaluation .    HPI:   49 y.o.male with past medical history of HIV (controlled/undetectable on Biktarvy), peripheral neuropathy, hashimoto's, anxiety, depression, PTSD, allergic rhinitis, chronic fatigue, fibromyalgia, DJD of lumbar spine, history of iron deficiency anemia, and pernicious anemia who presents to establish care regarding anemia.    He reports history of anemia after being initiated on anti retroviral therapy for HIV starting 2012. Has required iron infusion over the last summer, Feraheme 7/31/2024. Currently on B12 injection for the past year. Was noted to have anti parietal antibody positive.     He denies evidence of bleeding. Last colonoscopy 9/29/2022 by Dr. Tavares Barajas, noted to have diverticulum distal ileum, normal from cecum to rectum, normal rectal retroflexion. Next one due in 10 years.    Endorses increased fatigue. Also notes intermittent swelling of the R calf, associated with erythema and pain. Denies smoking, alcohol use, drug use. Currently works for the state, remains immobile during the day, works from home. Family history significant for father with sudden cardiac death and potential leukemia at age 75, mother with dementia and anemia.      The following portions of the patient's history were reviewed and updated as appropriate: allergies, current medications, past family history, past medical history, past social history, past surgical history and problem list.    Objective     Objective     Allergy:   Allergies   Allergen Reactions    Azithromycin GI Intolerance    Lyrica [Pregabalin] Other (See Comments)     Sedation, slept for 3 days after one dose    Erythromycin GI Intolerance and Diarrhea     erythromycin -  Gastrointestinal upset        Current Medications:   Current Outpatient Medications   Medication Sig Dispense Refill    acyclovir (ZOVIRAX) 5 % ointment Apply  topically to the appropriate area as directed Every 4 (Four) Hours As Needed (fever blisters). 30 g 1    Bictegravir-Emtricitab-Tenofov (BIKTARVY) -25 MG per tablet Take 1 tablet by mouth Daily.      calcium carbonate (OS-SHERRI) 600 MG tablet Take 1 tablet by mouth Daily.      cyanocobalamin 1000 MCG/ML injection Inject 1 mL into the appropriate muscle as directed by prescriber Every 30 (Thirty) Days. 6 mL 1    DULoxetine (CYMBALTA) 30 MG capsule Take 1 capsule by mouth 2 (Two) Times a Day.      gabapentin (NEURONTIN) 100 MG capsule Take 1 capsule by mouth.      hydrOXYzine pamoate (VISTARIL) 25 MG capsule Take 1 capsule by mouth.      ibuprofen (ADVIL,MOTRIN) 800 MG tablet Take 1 tablet by mouth Every 6 (Six) Hours As Needed for Mild Pain. 120 tablet 1    Linzess 145 MCG capsule capsule Take 1 capsule by mouth Every Morning Before Breakfast.      Spiriva Respimat 1.25 MCG/ACT aerosol solution inhaler Inhale 2 puffs Daily.      tiZANidine (ZANAFLEX) 4 MG tablet Take 1 tablet by mouth Every 6 (Six) Hours As Needed.      vitamin D (ERGOCALCIFEROL) 1.25 MG (48400 UT) capsule capsule Take 1 capsule by mouth 1 (One) Time Per Week. 12 capsule 3     No current facility-administered medications for this visit.       Past Medical History:  Past Medical History:   Diagnosis Date    Acid reflux     Allergic     Most of my life    Anemia     Anxiety     Asthma     Shortness of breath    Depression     Fibromyalgia, primary 01/01/2017    Newly diagnosed last year    History of medical problems     HIV disease 04/2102    Hyperlipidemia     Hypothyroidism 06/13/24    Hashimotos thyrooditis    Intolerance, drug     Iron deficiency     Irritable bowel syndrome     I have having adominal issues    Numbness and tingling     Osteopenia     Trigeminal neuralgia        Past  "Surgical History:  Past Surgical History:   Procedure Laterality Date    VASCULAR SURGERY Right 2021    VASCULAR SURGERY Left     WISDOM TOOTH EXTRACTION         Social History:  Social History     Socioeconomic History    Marital status: Single   Tobacco Use    Smoking status: Former     Current packs/day: 0.00     Average packs/day: 2.0 packs/day for 7.0 years (14.0 ttl pk-yrs)     Types: Cigarettes     Start date: 2010     Quit date: 2017     Years since quittin.9     Passive exposure: Past    Smokeless tobacco: Never   Vaping Use    Vaping status: Never Used   Substance and Sexual Activity    Alcohol use: No    Drug use: No    Sexual activity: Yes     Partners: Male     Birth control/protection: Condom         Family History:  Family History   Problem Relation Age of Onset    Hypertension Mother     Diabetes Mother     Hyperlipidemia Mother     Arthritis Mother     Obesity Mother     Osteoporosis Mother     Thyroid disease Mother     COPD Father     Asthma Maternal Grandmother     Stroke Paternal Grandmother     Diabetes Paternal Grandmother        Review of Systems:  Review of Systems   Constitutional:  Positive for fatigue.   Cardiovascular:  Positive for leg swelling.       Vital Signs:   /84   Pulse 73   Temp 98 °F (36.7 °C) (Temporal)   Resp 20   Ht 182.9 cm (72\")   Wt 91.4 kg (201 lb 6.4 oz)   SpO2 92%   BMI 27.31 kg/m²      Physical Exam:  Physical Exam  Vitals reviewed.   Constitutional:       General: He is not in acute distress.     Appearance: Normal appearance. He is not ill-appearing.      Comments: +ambulates with a walker   HENT:      Head: Normocephalic and atraumatic.      Mouth/Throat:      Mouth: Mucous membranes are moist.      Pharynx: Oropharynx is clear.   Eyes:      Conjunctiva/sclera: Conjunctivae normal.      Pupils: Pupils are equal, round, and reactive to light.   Cardiovascular:      Rate and Rhythm: Normal rate and regular rhythm.      Heart sounds: No " "murmur heard.  Pulmonary:      Effort: Pulmonary effort is normal. No respiratory distress.      Breath sounds: Normal breath sounds. No wheezing.   Abdominal:      General: Abdomen is flat. Bowel sounds are normal. There is no distension.      Palpations: Abdomen is soft. There is no mass.      Tenderness: There is no abdominal tenderness. There is no guarding.   Musculoskeletal:         General: No swelling. Normal range of motion.      Cervical back: Normal range of motion.   Lymphadenopathy:      Cervical: No cervical adenopathy.   Skin:     General: Skin is warm and dry.   Neurological:      General: No focal deficit present.      Mental Status: He is alert and oriented to person, place, and time. Mental status is at baseline.   Psychiatric:         Mood and Affect: Mood normal.         PHQ-9 Score  PHQ-9 Total Score:       Pain Score  Vitals:    12/09/24 1447   BP: 126/84   Pulse: 73   Resp: 20   Temp: 98 °F (36.7 °C)   TempSrc: Temporal   SpO2: 92%   Weight: 91.4 kg (201 lb 6.4 oz)   Height: 182.9 cm (72\")   PainSc:   8   PainLoc: Leg  Comment: patient complains of lower back pain, legs and feet       ECOG score: 0           PAINSCOREQUALITYMETRIC: Fei Ojeda reports a pain score of 8.  Given his pain assessment as noted, treatment options were discussed and the following options were decided upon as a follow-up plan to address the patient's pain: continuation of current treatment plan for pain.     Lab Review  Lab Results   Component Value Date    WBC 7.84 12/09/2024    HGB 16.1 12/09/2024    HCT 48.6 12/09/2024    MCV 97.8 (H) 12/09/2024    RDW 12.5 12/09/2024     12/09/2024    NEUTRORELPCT 46.1 12/09/2024    LYMPHORELPCT 42.2 12/09/2024    MONORELPCT 8.5 12/09/2024    EOSRELPCT 1.9 12/09/2024    BASORELPCT 1.0 12/09/2024    NEUTROABS 3.61 12/09/2024    LYMPHSABS 3.31 (H) 12/09/2024       Lab Results   Component Value Date     12/09/2024    K 4.2 12/09/2024    CO2 28.5 12/09/2024     " 12/09/2024    BUN 15 12/09/2024    CREATININE 1.12 12/09/2024    EGFRIFNONA >60 03/21/2022    EGFRIFAFRI >60 03/21/2022    GLUCOSE 108 (H) 12/09/2024    CALCIUM 9.7 12/09/2024    ALKPHOS 62 12/09/2024    AST 20 12/09/2024    ALT 17 12/09/2024    BILITOT 0.4 12/09/2024    ALBUMIN 4.6 12/09/2024    PROTEINTOT 8.2 12/09/2024    MG 2.2 09/26/2022     Lab Results   Component Value Date    FERRITIN 9.03 (L) 06/05/2024    IRON 83 12/09/2024    TIBC 381 12/09/2024    LABIRON 22 12/09/2024    VPUIZPXJ01 524 06/04/2024    FOLATE 7.8 06/04/2024     10/21/2024          Radiology Results  DEXA Assess Fracture Vertebral (10/07/2024 14:11)   FINDINGS:   Bone mineral densities were calculated and T-scores were  generated. The lowest T score is left femur -1.2 in the   left femur.  This is compatible with  osteopenia.     IMPRESSION:  According to the World Health Organization definitions of  osteoporosis based on bone density, this patient's bone mineral density  is compatible with  osteopenia and the fracture risk is moderate.  Osteopenia in the left femur    MRI Lumbar Spine With & Without Contrast (08/24/2024 11:12)   FINDINGS:   Bone marrow signal is heterogeneous, however there is no  discrete lesion or abnormal enhancement. The lumbar vertebral bodies  maintain a normal height and alignment. The posterior elements are  intact throughout.     At the L4/5 level there is a broad-based disc bulge and facet  arthropathy, however there is no significant spinal stenosis or neural  foraminal narrowing.     At the L5/S1 level there is a broad-based disc bulge and facet  arthropathy without significant spinal stenosis or neural foraminal  narrowing.     The spinal cord terminates at the L1 level. No conus lesions are  present. There is no pathologic enhancement. The paraspinal soft tissues  are unremarkable.     IMPRESSION:  1. Degenerative change in the lower lumbar spine without significant  spinal stenosis or neural foraminal  narrowing.  2. Heterogeneous bone marrow signal without a discrete lesion or  pathologic enhancement which can be seen in the setting of anemia,  smoking history or lymphoproliferative disorder.    Pathology:     APCA+IF AB Reflex (04/12/2023 15:36)         Endoscopy:     COLONOSCOPY - SCAN - COLONOSCOPY,CLARENCE LEE ELOY,[id number] (09/29/2022)           Assessment / Plan         Assessment and Plan   Fei Ojeda is a 49 y.o. presents for     Anemia   Iron deficiency anemia  Pernicious anemia  HIV, controlled on anti retroviral therapy   -Patient with progressive history of anemia, thought to be due to anti retroviral therapy which began in 2012. Work up has revealed parietal antibody positive (99.3), elevated MMA (1230) April 2023, and low iron with ferritin (9 ng/mL) June 2024.   -Denies bleeding, last colonoscopy 2022 as above, no evidence of malignancy. Next one due in 10 years.  -No previous blood transfusion or bone marrow biopsy  -Required Feraheme July 2024 with improvement in symptoms. Previously attempted oral iron supplements with significant constipation for >10 days.   -Has been on cyanocobalamin injection 1000 mcg monthly  -We discussed that the differential for anemia is pretty broad.  It could be related to iron deficiency, chronic inflammation or due to chronic disease processes, vitamin deficiencies, hemolysis, or other underlying bone marrow disorder such as myeloproliferative neoplasm or myelodysplastic syndromes or plasma cell dyscrasias.  -Check CBC with differential, peripheral smear, iron profile, ferritin, folate level, vitamin B12, methylmalonic acid, LDH, reticulocytosis, haptoglobin, TSH, serum protein electrophoresis, immunofixation, immunoglobulin free light chains    2. R leg swelling and pain  -Venous duplex R leg ordered         Discussed possible differential diagnoses, testing, treatment, recommended non-pharmacological interventions, risks, warning signs to monitor for that would  indicate need for follow-up in clinic or ER. If no improvement with these regimens or you have new or worsening symptoms follow-up. Patient verbalizes understanding and agreement with plan of care. Denies further needs or concerns.     Patient was given instructions and counseling regarding her condition and for health maintenance advised.       All questions were answered to his satisfaction.        ACO / SAFIA/Other  Quality measures  -  Fei Ojeda did not receive 2024 flu vaccine.  This was recommended.  -  Fei Ojeda reports a pain score of 8.  Given his pain assessment as noted, treatment options were discussed and the following options were decided upon as a follow-up plan to address the patient's pain: continuation of current treatment plan for pain.  -  Current outpatient and discharge medications have been reconciled for the patient.  Reviewed by: Juliette Jackson MD          Meds ordered during this visit  No orders of the defined types were placed in this encounter.      Visit Diagnoses    ICD-10-CM ICD-9-CM   1. Iron deficiency  E61.1 280.9   2. Pernicious anemia  D51.0 281.0   3. Anemia, unspecified type  D64.9 285.9   4. Leg swelling  M79.89 729.81   5. Asymptomatic HIV infection, with no history of HIV-related illness  Z21 V08       Follow Up   In 3 weeks to review the above work up  Check: endomysial antibody IgA, tissue transglutaminase IgA, parietal Ab, intrinsic antibody Ab, CBC        This document has been electronically signed by Juliette Jackson MD   December 9, 2024 16:17 EST    Dictated Utilizing Dragon Dictation: Part of this note may be an electronic transcription/translation of spoken language to printed text using the Dragon Dictation System.

## 2024-12-09 ENCOUNTER — LAB (OUTPATIENT)
Dept: ONCOLOGY | Facility: CLINIC | Age: 49
End: 2024-12-09
Payer: COMMERCIAL

## 2024-12-09 ENCOUNTER — CONSULT (OUTPATIENT)
Dept: ONCOLOGY | Facility: CLINIC | Age: 49
End: 2024-12-09
Payer: COMMERCIAL

## 2024-12-09 VITALS
RESPIRATION RATE: 20 BRPM | SYSTOLIC BLOOD PRESSURE: 126 MMHG | BODY MASS INDEX: 27.28 KG/M2 | HEART RATE: 73 BPM | TEMPERATURE: 98 F | DIASTOLIC BLOOD PRESSURE: 84 MMHG | WEIGHT: 201.4 LBS | OXYGEN SATURATION: 92 % | HEIGHT: 72 IN

## 2024-12-09 DIAGNOSIS — Z21 ASYMPTOMATIC HIV INFECTION, WITH NO HISTORY OF HIV-RELATED ILLNESS: ICD-10-CM

## 2024-12-09 DIAGNOSIS — E61.1 IRON DEFICIENCY: Primary | ICD-10-CM

## 2024-12-09 DIAGNOSIS — D51.0 PERNICIOUS ANEMIA: ICD-10-CM

## 2024-12-09 DIAGNOSIS — D64.9 ANEMIA, UNSPECIFIED TYPE: ICD-10-CM

## 2024-12-09 DIAGNOSIS — E61.1 IRON DEFICIENCY: ICD-10-CM

## 2024-12-09 DIAGNOSIS — M79.89 LEG SWELLING: ICD-10-CM

## 2024-12-09 LAB
ALBUMIN SERPL-MCNC: 4.6 G/DL (ref 3.5–5.2)
ALBUMIN/GLOB SERPL: 1.3 G/DL
ALP SERPL-CCNC: 62 U/L (ref 39–117)
ALT SERPL W P-5'-P-CCNC: 17 U/L (ref 1–41)
ANION GAP SERPL CALCULATED.3IONS-SCNC: 10.5 MMOL/L (ref 5–15)
AST SERPL-CCNC: 20 U/L (ref 1–40)
BASOPHILS # BLD AUTO: 0.08 10*3/MM3 (ref 0–0.2)
BASOPHILS NFR BLD AUTO: 1 % (ref 0–1.5)
BILIRUB SERPL-MCNC: 0.4 MG/DL (ref 0–1.2)
BUN SERPL-MCNC: 15 MG/DL (ref 6–20)
BUN/CREAT SERPL: 13.4 (ref 7–25)
CALCIUM SPEC-SCNC: 9.7 MG/DL (ref 8.6–10.5)
CHLORIDE SERPL-SCNC: 100 MMOL/L (ref 98–107)
CO2 SERPL-SCNC: 28.5 MMOL/L (ref 22–29)
CREAT SERPL-MCNC: 1.12 MG/DL (ref 0.76–1.27)
DEPRECATED RDW RBC AUTO: 44.7 FL (ref 37–54)
EGFRCR SERPLBLD CKD-EPI 2021: 80.5 ML/MIN/1.73
EOSINOPHIL # BLD AUTO: 0.15 10*3/MM3 (ref 0–0.4)
EOSINOPHIL NFR BLD AUTO: 1.9 % (ref 0.3–6.2)
ERYTHROCYTE [DISTWIDTH] IN BLOOD BY AUTOMATED COUNT: 12.5 % (ref 12.3–15.4)
FERRITIN SERPL-MCNC: 108.1 NG/ML (ref 30–400)
GLOBULIN UR ELPH-MCNC: 3.6 GM/DL
GLUCOSE SERPL-MCNC: 108 MG/DL (ref 65–99)
HCT VFR BLD AUTO: 48.6 % (ref 37.5–51)
HGB BLD-MCNC: 16.1 G/DL (ref 13–17.7)
IMM GRANULOCYTES # BLD AUTO: 0.02 10*3/MM3 (ref 0–0.05)
IMM GRANULOCYTES NFR BLD AUTO: 0.3 % (ref 0–0.5)
IRON 24H UR-MRATE: 83 MCG/DL (ref 59–158)
IRON SATN MFR SERPL: 22 % (ref 20–50)
LDH SERPL-CCNC: 178 U/L (ref 135–225)
LYMPHOCYTES # BLD AUTO: 3.31 10*3/MM3 (ref 0.7–3.1)
LYMPHOCYTES NFR BLD AUTO: 42.2 % (ref 19.6–45.3)
MCH RBC QN AUTO: 32.4 PG (ref 26.6–33)
MCHC RBC AUTO-ENTMCNC: 33.1 G/DL (ref 31.5–35.7)
MCV RBC AUTO: 97.8 FL (ref 79–97)
MONOCYTES # BLD AUTO: 0.67 10*3/MM3 (ref 0.1–0.9)
MONOCYTES NFR BLD AUTO: 8.5 % (ref 5–12)
NEUTROPHILS NFR BLD AUTO: 3.61 10*3/MM3 (ref 1.7–7)
NEUTROPHILS NFR BLD AUTO: 46.1 % (ref 42.7–76)
NRBC BLD AUTO-RTO: 0 /100 WBC (ref 0–0.2)
PLATELET # BLD AUTO: 297 10*3/MM3 (ref 140–450)
PMV BLD AUTO: 9.7 FL (ref 6–12)
POTASSIUM SERPL-SCNC: 4.2 MMOL/L (ref 3.5–5.2)
PROT SERPL-MCNC: 8.2 G/DL (ref 6–8.5)
RBC # BLD AUTO: 4.97 10*6/MM3 (ref 4.14–5.8)
RETICS # AUTO: 0.07 10*6/MM3 (ref 0.02–0.13)
RETICS/RBC NFR AUTO: 1.45 % (ref 0.7–1.9)
SODIUM SERPL-SCNC: 139 MMOL/L (ref 136–145)
TIBC SERPL-MCNC: 381 MCG/DL (ref 298–536)
TRANSFERRIN SERPL-MCNC: 256 MG/DL (ref 200–360)
WBC NRBC COR # BLD AUTO: 7.84 10*3/MM3 (ref 3.4–10.8)

## 2024-12-09 PROCEDURE — 85025 COMPLETE CBC W/AUTO DIFF WBC: CPT | Performed by: INTERNAL MEDICINE

## 2024-12-09 PROCEDURE — 86334 IMMUNOFIX E-PHORESIS SERUM: CPT | Performed by: INTERNAL MEDICINE

## 2024-12-09 PROCEDURE — 83921 ORGANIC ACID SINGLE QUANT: CPT | Performed by: INTERNAL MEDICINE

## 2024-12-09 PROCEDURE — 82668 ASSAY OF ERYTHROPOIETIN: CPT | Performed by: INTERNAL MEDICINE

## 2024-12-09 PROCEDURE — 80053 COMPREHEN METABOLIC PANEL: CPT | Performed by: INTERNAL MEDICINE

## 2024-12-09 PROCEDURE — 82607 VITAMIN B-12: CPT | Performed by: INTERNAL MEDICINE

## 2024-12-09 PROCEDURE — 82784 ASSAY IGA/IGD/IGG/IGM EACH: CPT | Performed by: INTERNAL MEDICINE

## 2024-12-09 PROCEDURE — 84238 ASSAY NONENDOCRINE RECEPTOR: CPT | Performed by: INTERNAL MEDICINE

## 2024-12-09 PROCEDURE — 83615 LACTATE (LD) (LDH) ENZYME: CPT | Performed by: INTERNAL MEDICINE

## 2024-12-09 PROCEDURE — 82728 ASSAY OF FERRITIN: CPT | Performed by: INTERNAL MEDICINE

## 2024-12-09 PROCEDURE — 84466 ASSAY OF TRANSFERRIN: CPT | Performed by: INTERNAL MEDICINE

## 2024-12-09 PROCEDURE — 84165 PROTEIN E-PHORESIS SERUM: CPT | Performed by: INTERNAL MEDICINE

## 2024-12-09 PROCEDURE — 85045 AUTOMATED RETICULOCYTE COUNT: CPT | Performed by: INTERNAL MEDICINE

## 2024-12-09 PROCEDURE — 82746 ASSAY OF FOLIC ACID SERUM: CPT | Performed by: INTERNAL MEDICINE

## 2024-12-09 PROCEDURE — 83540 ASSAY OF IRON: CPT | Performed by: INTERNAL MEDICINE

## 2024-12-09 PROCEDURE — 83521 IG LIGHT CHAINS FREE EACH: CPT | Performed by: INTERNAL MEDICINE

## 2024-12-09 PROCEDURE — 99204 OFFICE O/P NEW MOD 45 MIN: CPT | Performed by: INTERNAL MEDICINE

## 2024-12-09 PROCEDURE — 83010 ASSAY OF HAPTOGLOBIN QUANT: CPT | Performed by: INTERNAL MEDICINE

## 2024-12-09 RX ORDER — LINACLOTIDE 145 UG/1
145 CAPSULE, GELATIN COATED ORAL
COMMUNITY
Start: 2024-12-01

## 2024-12-09 RX ORDER — TIOTROPIUM BROMIDE INHALATION SPRAY 1.56 UG/1
2 SPRAY, METERED RESPIRATORY (INHALATION)
COMMUNITY
Start: 2024-12-01

## 2024-12-09 NOTE — PROGRESS NOTES
Venipuncture Blood Specimen Collection  Venipuncture performed in right arm by Joaquina Chaves MA with good hemostasis. Patient tolerated the procedure well without complications.   12/09/24   Joaquina Chaves MA

## 2024-12-10 LAB
FOLATE SERPL-MCNC: 10.1 NG/ML (ref 4.78–24.2)
HAPTOGLOB SERPL-MCNC: 137 MG/DL (ref 30–200)
VIT B12 BLD-MCNC: 604 PG/ML (ref 211–946)

## 2024-12-11 LAB
EPO SERPL-ACNC: 5.4 MIU/ML (ref 2.6–18.5)
KAPPA LC FREE SER-MCNC: 23.6 MG/L (ref 3.3–19.4)
KAPPA LC FREE/LAMBDA FREE SER: 1.86 {RATIO} (ref 0.26–1.65)
LAMBDA LC FREE SERPL-MCNC: 12.7 MG/L (ref 5.7–26.3)

## 2024-12-12 LAB
ALBUMIN SERPL ELPH-MCNC: 4.2 G/DL (ref 2.9–4.4)
ALBUMIN/GLOB SERPL: 1.3 {RATIO} (ref 0.7–1.7)
ALPHA1 GLOB SERPL ELPH-MCNC: 0.2 G/DL (ref 0–0.4)
ALPHA2 GLOB SERPL ELPH-MCNC: 0.8 G/DL (ref 0.4–1)
B-GLOBULIN SERPL ELPH-MCNC: 1.2 G/DL (ref 0.7–1.3)
GAMMA GLOB SERPL ELPH-MCNC: 1.1 G/DL (ref 0.4–1.8)
GLOBULIN SER-MCNC: 3.4 G/DL (ref 2.2–3.9)
IGA SERPL-MCNC: 363 MG/DL (ref 90–386)
IGG SERPL-MCNC: 1127 MG/DL (ref 603–1613)
IGM SERPL-MCNC: 137 MG/DL (ref 20–172)
INTERPRETATION SERPL IEP-IMP: NORMAL
LABORATORY COMMENT REPORT: NORMAL
M PROTEIN SERPL ELPH-MCNC: NORMAL G/DL
PROT SERPL-MCNC: 7.6 G/DL (ref 6–8.5)

## 2024-12-13 LAB — STFR SERPL-SCNC: 14.3 NMOL/L (ref 12.2–27.3)

## 2024-12-16 ENCOUNTER — PATIENT ROUNDING (BHMG ONLY) (OUTPATIENT)
Dept: ONCOLOGY | Facility: CLINIC | Age: 49
End: 2024-12-16
Payer: COMMERCIAL

## 2024-12-16 LAB — METHYLMALONATE SERPL-SCNC: 175 NMOL/L (ref 0–378)

## 2024-12-23 ENCOUNTER — HOSPITAL ENCOUNTER (OUTPATIENT)
Facility: HOSPITAL | Age: 49
Discharge: HOME | End: 2024-12-23
Admitting: INTERNAL MEDICINE
Payer: COMMERCIAL

## 2024-12-23 DIAGNOSIS — M79.89 LEG SWELLING: ICD-10-CM

## 2024-12-23 PROCEDURE — 93971 EXTREMITY STUDY: CPT | Performed by: RADIOLOGY

## 2024-12-23 PROCEDURE — 93971 EXTREMITY STUDY: CPT

## 2025-01-02 ENCOUNTER — PATIENT MESSAGE (OUTPATIENT)
Dept: INTERNAL MEDICINE | Facility: CLINIC | Age: 50
End: 2025-01-02
Payer: COMMERCIAL

## 2025-01-15 ENCOUNTER — TRANSCRIBE ORDERS (OUTPATIENT)
Dept: ADMINISTRATIVE | Facility: HOSPITAL | Age: 50
End: 2025-01-15
Payer: COMMERCIAL

## 2025-01-15 ENCOUNTER — LAB (OUTPATIENT)
Dept: LAB | Facility: HOSPITAL | Age: 50
End: 2025-01-15
Payer: COMMERCIAL

## 2025-01-15 DIAGNOSIS — B20 HUMAN IMMUNODEFICIENCY VIRUS (HIV) DISEASE: ICD-10-CM

## 2025-01-15 DIAGNOSIS — B20 HUMAN IMMUNODEFICIENCY VIRUS (HIV) DISEASE: Primary | ICD-10-CM

## 2025-01-15 LAB
ALBUMIN SERPL-MCNC: 4.1 G/DL (ref 3.5–5.2)
ALBUMIN/GLOB SERPL: 1.3 G/DL
ALP SERPL-CCNC: 53 U/L (ref 39–117)
ALT SERPL W P-5'-P-CCNC: 14 U/L (ref 1–41)
ANION GAP SERPL CALCULATED.3IONS-SCNC: 7.3 MMOL/L (ref 5–15)
AST SERPL-CCNC: 16 U/L (ref 1–40)
BASOPHILS # BLD AUTO: 0.06 10*3/MM3 (ref 0–0.2)
BASOPHILS NFR BLD AUTO: 0.6 % (ref 0–1.5)
BILIRUB SERPL-MCNC: 0.3 MG/DL (ref 0–1.2)
BUN SERPL-MCNC: 18 MG/DL (ref 6–20)
BUN/CREAT SERPL: 15.8 (ref 7–25)
CALCIUM SPEC-SCNC: 9.3 MG/DL (ref 8.6–10.5)
CHLORIDE SERPL-SCNC: 102 MMOL/L (ref 98–107)
CO2 SERPL-SCNC: 28.7 MMOL/L (ref 22–29)
CREAT SERPL-MCNC: 1.14 MG/DL (ref 0.76–1.27)
DEPRECATED RDW RBC AUTO: 43.8 FL (ref 37–54)
EGFRCR SERPLBLD CKD-EPI 2021: 78.8 ML/MIN/1.73
EOSINOPHIL # BLD AUTO: 0.2 10*3/MM3 (ref 0–0.4)
EOSINOPHIL NFR BLD AUTO: 2.1 % (ref 0.3–6.2)
ERYTHROCYTE [DISTWIDTH] IN BLOOD BY AUTOMATED COUNT: 12.4 % (ref 12.3–15.4)
GLOBULIN UR ELPH-MCNC: 3.1 GM/DL
GLUCOSE SERPL-MCNC: 92 MG/DL (ref 65–99)
HCT VFR BLD AUTO: 43 % (ref 37.5–51)
HGB BLD-MCNC: 14.8 G/DL (ref 13–17.7)
IMM GRANULOCYTES # BLD AUTO: 0.02 10*3/MM3 (ref 0–0.05)
IMM GRANULOCYTES NFR BLD AUTO: 0.2 % (ref 0–0.5)
LYMPHOCYTES # BLD AUTO: 3.6 10*3/MM3 (ref 0.7–3.1)
LYMPHOCYTES NFR BLD AUTO: 38.3 % (ref 19.6–45.3)
MCH RBC QN AUTO: 33.6 PG (ref 26.6–33)
MCHC RBC AUTO-ENTMCNC: 34.4 G/DL (ref 31.5–35.7)
MCV RBC AUTO: 97.5 FL (ref 79–97)
MONOCYTES # BLD AUTO: 0.87 10*3/MM3 (ref 0.1–0.9)
MONOCYTES NFR BLD AUTO: 9.3 % (ref 5–12)
NEUTROPHILS NFR BLD AUTO: 4.64 10*3/MM3 (ref 1.7–7)
NEUTROPHILS NFR BLD AUTO: 49.5 % (ref 42.7–76)
NRBC BLD AUTO-RTO: 0 /100 WBC (ref 0–0.2)
PLATELET # BLD AUTO: 299 10*3/MM3 (ref 140–450)
PMV BLD AUTO: 10.4 FL (ref 6–12)
POTASSIUM SERPL-SCNC: 4.5 MMOL/L (ref 3.5–5.2)
PROT SERPL-MCNC: 7.2 G/DL (ref 6–8.5)
RBC # BLD AUTO: 4.41 10*6/MM3 (ref 4.14–5.8)
SODIUM SERPL-SCNC: 138 MMOL/L (ref 136–145)
WBC NRBC COR # BLD AUTO: 9.39 10*3/MM3 (ref 3.4–10.8)

## 2025-01-15 PROCEDURE — 86356 MONONUCLEAR CELL ANTIGEN: CPT

## 2025-01-15 PROCEDURE — 86355 B CELLS TOTAL COUNT: CPT

## 2025-01-15 PROCEDURE — 80053 COMPREHEN METABOLIC PANEL: CPT

## 2025-01-15 PROCEDURE — 86359 T CELLS TOTAL COUNT: CPT

## 2025-01-15 PROCEDURE — 86360 T CELL ABSOLUTE COUNT/RATIO: CPT

## 2025-01-15 PROCEDURE — 85025 COMPLETE CBC W/AUTO DIFF WBC: CPT

## 2025-01-15 PROCEDURE — 87536 HIV-1 QUANT&REVRSE TRNSCRPJ: CPT

## 2025-01-15 PROCEDURE — 86357 NK CELLS TOTAL COUNT: CPT

## 2025-01-15 PROCEDURE — 36415 COLL VENOUS BLD VENIPUNCTURE: CPT

## 2025-01-17 LAB
HIV1 RNA # SERPL NAA+PROBE: <20 COPIES/ML
HIV1 RNA SERPL NAA+PROBE-LOG#: NORMAL LOG10COPY/ML

## 2025-01-18 LAB
CD19 CELLS NFR SPEC: 7 % (ref 6–23)
CD3 CELLS # BLD: 3027 CELLS/UL (ref 570–2400)
CD3 CELLS NFR SPEC: 80 % (ref 62–87)
CD3+CD4+ CELLS # BLD: 1186 CELLS/UL (ref 430–1800)
CD3+CD4+ CELLS NFR BLD: 31 % (ref 32–64)
CD3+CD4+ CELLS/CD3+CD8+ CLL BLD: 0.66 RATIO (ref 0.8–3.9)
CD3+CD8+ CELLS # BLD: 1768 CELLS/UL (ref 210–1200)
CD3+CD8+ CELLS NFR SPEC: 47 % (ref 15–46)
CD3-CD16+CD56+ CELLS # SPEC: 475 CELLS/UL (ref 78–470)
CD3-CD16+CD56+ CELLS NFR SPEC: 13 % (ref 4–26)
CD4+CD45+ CELLS NFR BLD: 70 % (ref 28–72)
CD4+CD45RA+ CELLS # BLD: 382 CELLS/UL (ref 150–870)
CD4+CD45RO+ CELLS # BLD: 913 CELLS/UL (ref 190–1050)
CD45RA CELLS NFR BLD: 30 % (ref 28–71)
CELLS.CD3-CD19+ [#/VOLUME] IN BLOOD: 261 CELLS/UL (ref 91–610)
CONV COMMENT: ABNORMAL

## 2025-02-05 ENCOUNTER — PATIENT MESSAGE (OUTPATIENT)
Dept: INTERNAL MEDICINE | Facility: CLINIC | Age: 50
End: 2025-02-05
Payer: COMMERCIAL

## 2025-02-05 DIAGNOSIS — R20.2 NUMBNESS AND TINGLING IN LEFT ARM: ICD-10-CM

## 2025-02-05 DIAGNOSIS — R20.2 NUMBNESS AND TINGLING OF RIGHT ARM: Primary | ICD-10-CM

## 2025-02-05 DIAGNOSIS — R20.0 NUMBNESS AND TINGLING IN LEFT ARM: ICD-10-CM

## 2025-02-05 DIAGNOSIS — R20.0 NUMBNESS AND TINGLING OF RIGHT ARM: Primary | ICD-10-CM

## 2025-02-18 ENCOUNTER — OFFICE VISIT (OUTPATIENT)
Dept: INTERNAL MEDICINE | Facility: CLINIC | Age: 50
End: 2025-02-18
Payer: COMMERCIAL

## 2025-02-18 VITALS
WEIGHT: 208 LBS | BODY MASS INDEX: 28.17 KG/M2 | TEMPERATURE: 98 F | HEART RATE: 70 BPM | OXYGEN SATURATION: 98 % | HEIGHT: 72 IN | SYSTOLIC BLOOD PRESSURE: 120 MMHG | DIASTOLIC BLOOD PRESSURE: 76 MMHG

## 2025-02-18 DIAGNOSIS — M54.50 MIDLINE LOW BACK PAIN WITHOUT SCIATICA, UNSPECIFIED CHRONICITY: ICD-10-CM

## 2025-02-18 DIAGNOSIS — G57.93 NEUROPATHY INVOLVING BOTH LOWER EXTREMITIES: ICD-10-CM

## 2025-02-18 DIAGNOSIS — R20.2 NUMBNESS AND TINGLING OF RIGHT ARM: ICD-10-CM

## 2025-02-18 DIAGNOSIS — R20.0 NUMBNESS AND TINGLING OF RIGHT ARM: ICD-10-CM

## 2025-02-18 DIAGNOSIS — R20.0 NUMBNESS AND TINGLING IN LEFT ARM: ICD-10-CM

## 2025-02-18 DIAGNOSIS — R20.2 NUMBNESS AND TINGLING IN LEFT ARM: ICD-10-CM

## 2025-02-18 DIAGNOSIS — R29.6 FALLING EPISODES: ICD-10-CM

## 2025-02-18 DIAGNOSIS — M19.90 INFLAMMATORY ARTHRITIS: Primary | ICD-10-CM

## 2025-02-18 DIAGNOSIS — Z21 HIV INFECTION, UNSPECIFIED SYMPTOM STATUS: ICD-10-CM

## 2025-02-18 DIAGNOSIS — F41.0 PANIC ATTACKS: ICD-10-CM

## 2025-02-18 NOTE — LETTER
February 18, 2025     Patient: Fei Ojeda   YOB: 1975   Date of Visit: 2/18/2025       To Whom It May Concern:    Fei Ojeda has been under my care for primary care services since August, 2022.  He is currently diagnosed with and being treated for HIV-associated peripheral neuropathy, HIV disease, fibromyalgia, inflammatory polyarthropathy, multiple vitamin deficiencies, severe iron deficiency.  He is also diagnosed and dealing with major depressive disorder, generalized anxiety disorder, panic attacks, PTSD and panic disorder.    [place] neuropathy causes symptoms of severe numbness, burning, tingling, pins and needle sensation of both feet, legs and bilateral swelling of both lower extremities.  Due to this numbness, he is at risk for falls and has fallen 0-20 falls in the past year.  Experiences severe lower back pain, has multiple tender trigger points scattered from neck to feet.  Has begun to experience memory issues, restlessness, fatigue, difficulty concentrating, irritability and sleep disturbances.  Feels he has a marked limitation in understanding, remembering or applying information, concentrating, maintaining a persistent pace with work which make adapting to changing work environs and time management very difficult.      Fei works from home at a desk.  His medical conditions limit his ability to work full-time and complete his assigned tasks.  He is only able to tolerate sitting for 5-60 minutes at a time before needing to stand up or take a break.  Physical therapy recommended that he would benefit from at least 1 standing break for longer than a minute or more every 30 minutes during the workday as well as 15 minutes every 2 hours during which he should elevate his legs to reduce swelling.  Thus far, this recommendation has not been accommodated.  Currently able to tolerate sitting 3-4 hours total in an 8-hour workday.  Fei can stand and walk using assist for balance  and safety for about 15 minutes at a time; uses walker and he can stand and walk for 1-2 hours total in an 8-hour workday he is able to walk about 100 feet with assistive devices.  Fei's ability to bend, crawl, kneel, crouch or reach overhead is significantly limited due to needing both hands to stabilize him with assistive devices.  He has an understandably extreme fear of falling.    Due to these limitations/conditions, Fei will be off task 20-25% of the workday.  He needs extra work breaks and rest periods during a medical episode.  He often needs 2-3 extra breaks per day lasting for 15-20 minutes at a time, 3-4 times a week.  He may need to lay down during these episodes for 20-25 minutes at a time to elevate his feet and legs above heart level, as recommended by myself and physical therapy.  He attends 3-4 or more medical appointments each month for which he will miss work.  Due to these medical conditions and limitations he is no longer able to work full-time.  He can only tolerate working part-time 1-2 days a week up to 15-20 hours at this time.    Fei's ability to continue working full-time is severely limited due to his progressive medical conditions.  Would need several workplace accommodations to perform competitive work in any field or setting.    Feel free to contact me with any questions or concerns.       Sincerely,        ASHWINI Shearer    CC: No Recipients

## 2025-02-18 NOTE — PROGRESS NOTES
Office Visit      Patient Name: Fei Ojeda  : 1975   MRN: [id number]     Chief Complaint:    Chief Complaint   Patient presents with    Fall     Neuropathy worse, has fallen 6 times     History of Present Illness  Fei Ojeda is a 49 y.o. male presents for evaluation of neuropathy, depression, panic attacks, and inflammatory arthritis.    He cannot maintain a seated position for an 8-hour workday, necessitating a change in his work schedule. Despite submitting [facility] documentation, his employer approved only intermittent leave, rejecting the request for leg elevation. He provided additional information on his diagnoses, limitations, and restrictions, but his employer requires further details on the severity and intensity of his condition. He experiences leg swelling after prolonged sitting, limiting him to 3-4 hours of sitting or walking within an 8-hour period. His mobility is restricted to approximately 100 feet with an assistive device. He reports frequent falls due to leg swelling and burning sensations, often landing on his knees or grabbing his walker for support. He is concerned about potential loss of insurance if he reduces his work hours and is uncertain about Medicaid eligibility. He plans to consult a disability . He has been advised to contact the state division to determine his disability status, which would allow access to his detention funds, but he does not meet the 5-year employment requirement. He contacted a clinic in [place] that assists patients seeking disability but was informed they do not accept Medicaid and have a long wait time. He has scheduled numerous medical appointments and wishes to avoid disrupting his insurance coverage until these are completed. He is considering reducing his work hours to 15 per week. He is unsure if he can continue working due to his physical limitations. He has tried various medications, including Lyrica, amitriptyline,  nortriptyline, Depakote, and Remeron, without success. He has been advised against driving due to sedation from Benadryl. He expresses a strong desire to continue working and finds his situation distressing and depressing. He requires assistance from his sister for transportation and needs to take hourly breaks during long drives to rest and reposition. He finds even sedentary work challenging due to his inability to stand or elevate his legs as needed. He sets alarms to remind him to take his medications and plans to return to physical therapy due to frequent falls. He has been advised to get up every 30 minutes during PT sessions. He is requesting a diagnosis of inflammatory arthritis. He is under the care of a counselor and a med management doctor at the  HIV division, who prescribes gabapentin for calming. He is concerned about potential memory issues related to gabapentin use.    He takes hydroxyzine for panic attacks. Follows  for mood.            Subjective      I have reviewed and the following portions of the patient's history were updated as appropriate: past family history, past medical history, past social history, past surgical history and problem list.      Current Outpatient Medications:     acyclovir (ZOVIRAX) 5 % ointment, Apply  topically to the appropriate area as directed Every 4 (Four) Hours As Needed (fever blisters)., Disp: 30 g, Rfl: 1    Bictegravir-Emtricitab-Tenofov (BIKTARVY) -25 MG per tablet, Take 1 tablet by mouth Daily., Disp: , Rfl:     calcium carbonate (OS-SHERRI) 600 MG tablet, Take 1 tablet by mouth Daily., Disp: , Rfl:     cyanocobalamin 1000 MCG/ML injection, Inject 1 mL into the appropriate muscle as directed by prescriber Every 30 (Thirty) Days., Disp: 6 mL, Rfl: 1    DULoxetine (CYMBALTA) 30 MG capsule, Take 1 capsule by mouth 2 (Two) Times a Day., Disp: , Rfl:     gabapentin (NEURONTIN) 100 MG capsule, Take 1 capsule by mouth., Disp: , Rfl:     hydrOXYzine pamoate  "(VISTARIL) 25 MG capsule, Take 1 capsule by mouth., Disp: , Rfl:     ibuprofen (ADVIL,MOTRIN) 800 MG tablet, Take 1 tablet by mouth Every 6 (Six) Hours As Needed for Mild Pain., Disp: 120 tablet, Rfl: 1    Linzess 145 MCG capsule capsule, Take 1 capsule by mouth Every Morning Before Breakfast., Disp: , Rfl:     Spiriva Respimat 1.25 MCG/ACT aerosol solution inhaler, Inhale 2 puffs Daily., Disp: , Rfl:     tiZANidine (ZANAFLEX) 4 MG tablet, Take 1 tablet by mouth Every 6 (Six) Hours As Needed., Disp: , Rfl:     vitamin D (ERGOCALCIFEROL) 1.25 MG (77352 UT) capsule capsule, Take 1 capsule by mouth 1 (One) Time Per Week., Disp: 12 capsule, Rfl: 3    Allergies   Allergen Reactions    Azithromycin GI Intolerance    Lyrica [Pregabalin] Other (See Comments)     Sedation, slept for 3 days after one dose    Erythromycin GI Intolerance and Diarrhea     erythromycin - Gastrointestinal upset       Objective     Physical Exam:  Vital Signs:   Vitals:    02/18/25 0948   BP: 120/76   Pulse: 70   Temp: 98 °F (36.7 °C)   SpO2: 98%   Weight: 94.3 kg (208 lb)   Height: 182.9 cm (72.01\")     Body mass index is 28.2 kg/m².         Physical Exam  Constitutional:       Appearance: He is not ill-appearing.      Comments: Using walker as he normally does    HENT:      Head: Normocephalic.      Right Ear: External ear normal.      Left Ear: External ear normal.   Eyes:      Conjunctiva/sclera: Conjunctivae normal.      Pupils: Pupils are equal, round, and reactive to light.   Cardiovascular:      Rate and Rhythm: Normal rate and regular rhythm.      Pulses:           Radial pulses are 2+ on the right side and 2+ on the left side.        Dorsalis pedis pulses are 2+ on the right side and 2+ on the left side.      Heart sounds: Normal heart sounds.   Pulmonary:      Effort: Pulmonary effort is normal.      Breath sounds: Normal breath sounds.   Musculoskeletal:      Cervical back: Normal range of motion and neck supple.   Skin:     General: " Skin is warm.      Capillary Refill: Capillary refill takes less than 2 seconds.   Neurological:      Mental Status: He is alert and oriented to person, place, and time.      Coordination: Coordination normal.      Gait: Gait abnormal.   Psychiatric:         Attention and Perception: Attention normal.         Mood and Affect: Mood and affect normal.         Speech: Speech normal.         Behavior: Behavior normal.             Assessment / Plan      Assessment/Plan:   Diagnoses and all orders for this visit:    1. Inflammatory arthritis (Primary)    2. Numbness and tingling of right arm    3. Numbness and tingling in left arm    4. Midline low back pain without sciatica, unspecified chronicity    5. Falling episodes    6. Neuropathy involving both lower extremities    7. HIV infection, unspecified symptom status    8. Panic attacks             Follow Up:   Return for Annual.    Patient was given instructions and counseling regarding his condition or for health maintenance advice. Please see specific information pulled into the AVS if appropriate.       Primary Care [address] [facility]     Please note that portions of this note may have been completed with a voice recognition program. Efforts were made to edit dictation, but occasionally words are mistranscribed.

## 2025-03-11 ENCOUNTER — OFFICE VISIT (OUTPATIENT)
Dept: ONCOLOGY | Facility: CLINIC | Age: 50
End: 2025-03-11
Payer: COMMERCIAL

## 2025-03-11 ENCOUNTER — LAB (OUTPATIENT)
Dept: ONCOLOGY | Facility: CLINIC | Age: 50
End: 2025-03-11
Payer: COMMERCIAL

## 2025-03-11 VITALS
HEART RATE: 74 BPM | DIASTOLIC BLOOD PRESSURE: 71 MMHG | BODY MASS INDEX: 28.53 KG/M2 | HEIGHT: 72 IN | TEMPERATURE: 98 F | SYSTOLIC BLOOD PRESSURE: 112 MMHG | RESPIRATION RATE: 20 BRPM | OXYGEN SATURATION: 98 % | WEIGHT: 210.6 LBS

## 2025-03-11 DIAGNOSIS — D51.0 PERNICIOUS ANEMIA: ICD-10-CM

## 2025-03-11 DIAGNOSIS — D64.9 ANEMIA, UNSPECIFIED TYPE: ICD-10-CM

## 2025-03-11 DIAGNOSIS — E61.1 IRON DEFICIENCY: Primary | ICD-10-CM

## 2025-03-11 DIAGNOSIS — E61.1 IRON DEFICIENCY: ICD-10-CM

## 2025-03-11 DIAGNOSIS — E53.8 FOLATE DEFICIENCY: ICD-10-CM

## 2025-03-11 LAB
BASOPHILS # BLD AUTO: 0.06 10*3/MM3 (ref 0–0.2)
BASOPHILS NFR BLD AUTO: 0.7 % (ref 0–1.5)
DEPRECATED RDW RBC AUTO: 45.3 FL (ref 37–54)
EOSINOPHIL # BLD AUTO: 0.21 10*3/MM3 (ref 0–0.4)
EOSINOPHIL NFR BLD AUTO: 2.6 % (ref 0.3–6.2)
ERYTHROCYTE [DISTWIDTH] IN BLOOD BY AUTOMATED COUNT: 12 % (ref 12.3–15.4)
HCT VFR BLD AUTO: 44.8 % (ref 37.5–51)
HGB BLD-MCNC: 14.5 G/DL (ref 13–17.7)
IMM GRANULOCYTES # BLD AUTO: 0.01 10*3/MM3 (ref 0–0.05)
IMM GRANULOCYTES NFR BLD AUTO: 0.1 % (ref 0–0.5)
IRON 24H UR-MRATE: 33 MCG/DL (ref 59–158)
IRON SATN MFR SERPL: 10 % (ref 20–50)
LYMPHOCYTES # BLD AUTO: 2.2 10*3/MM3 (ref 0.7–3.1)
LYMPHOCYTES NFR BLD AUTO: 26.8 % (ref 19.6–45.3)
MCH RBC QN AUTO: 32.5 PG (ref 26.6–33)
MCHC RBC AUTO-ENTMCNC: 32.4 G/DL (ref 31.5–35.7)
MCV RBC AUTO: 100.4 FL (ref 79–97)
MONOCYTES # BLD AUTO: 1.02 10*3/MM3 (ref 0.1–0.9)
MONOCYTES NFR BLD AUTO: 12.4 % (ref 5–12)
NEUTROPHILS NFR BLD AUTO: 4.7 10*3/MM3 (ref 1.7–7)
NEUTROPHILS NFR BLD AUTO: 57.4 % (ref 42.7–76)
NRBC BLD AUTO-RTO: 0 /100 WBC (ref 0–0.2)
PLATELET # BLD AUTO: 282 10*3/MM3 (ref 140–450)
PMV BLD AUTO: 9.8 FL (ref 6–12)
RBC # BLD AUTO: 4.46 10*6/MM3 (ref 4.14–5.8)
TIBC SERPL-MCNC: 343 MCG/DL (ref 298–536)
TRANSFERRIN SERPL-MCNC: 230 MG/DL (ref 200–360)
WBC NRBC COR # BLD AUTO: 8.2 10*3/MM3 (ref 3.4–10.8)

## 2025-03-11 PROCEDURE — 85025 COMPLETE CBC W/AUTO DIFF WBC: CPT | Performed by: INTERNAL MEDICINE

## 2025-03-11 PROCEDURE — 82728 ASSAY OF FERRITIN: CPT | Performed by: INTERNAL MEDICINE

## 2025-03-11 PROCEDURE — 86340 INTRINSIC FACTOR ANTIBODY: CPT | Performed by: INTERNAL MEDICINE

## 2025-03-11 PROCEDURE — 84466 ASSAY OF TRANSFERRIN: CPT | Performed by: INTERNAL MEDICINE

## 2025-03-11 PROCEDURE — 83540 ASSAY OF IRON: CPT | Performed by: INTERNAL MEDICINE

## 2025-03-11 PROCEDURE — 86231 EMA EACH IG CLASS: CPT | Performed by: NURSE PRACTITIONER

## 2025-03-11 PROCEDURE — 84481 FREE ASSAY (FT-3): CPT | Performed by: NURSE PRACTITIONER

## 2025-03-11 PROCEDURE — 80053 COMPREHEN METABOLIC PANEL: CPT | Performed by: NURSE PRACTITIONER

## 2025-03-11 PROCEDURE — 82607 VITAMIN B-12: CPT | Performed by: INTERNAL MEDICINE

## 2025-03-11 PROCEDURE — 82746 ASSAY OF FOLIC ACID SERUM: CPT | Performed by: INTERNAL MEDICINE

## 2025-03-11 PROCEDURE — 83516 IMMUNOASSAY NONANTIBODY: CPT | Performed by: NURSE PRACTITIONER

## 2025-03-11 PROCEDURE — 84443 ASSAY THYROID STIM HORMONE: CPT | Performed by: NURSE PRACTITIONER

## 2025-03-11 PROCEDURE — 84439 ASSAY OF FREE THYROXINE: CPT | Performed by: NURSE PRACTITIONER

## 2025-03-11 PROCEDURE — 80061 LIPID PANEL: CPT | Performed by: NURSE PRACTITIONER

## 2025-03-11 PROCEDURE — 86364 TISS TRNSGLTMNASE EA IG CLAS: CPT | Performed by: NURSE PRACTITIONER

## 2025-03-11 PROCEDURE — 82306 VITAMIN D 25 HYDROXY: CPT | Performed by: NURSE PRACTITIONER

## 2025-03-11 RX ORDER — TOPIRAMATE 25 MG/1
25 TABLET, FILM COATED ORAL DAILY
COMMUNITY
Start: 2025-03-09

## 2025-03-11 NOTE — PROGRESS NOTES
Venipuncture Blood Specimen Collection  Venipuncture performed in right arm by Joaquina Chaves MA with good hemostasis. Patient tolerated the procedure well without complications.   03/11/25   Joaquina Chaves MA

## 2025-03-11 NOTE — PROGRESS NOTES
Subjective     Date: 3/12/2025    Referring Provider  No ref. provider found    Chief Complaint  Pernicious anemia   Iron deficiency anemia     Subjective          Fei Ojeda is a 49 y.o. male who presents today to [facility] for follow up.    HPI:   49 y.o.male with past medical history of HIV (controlled/undetectable on Biktarvy), peripheral neuropathy, hashimoto's, anxiety, depression, PTSD, allergic rhinitis, chronic fatigue, fibromyalgia, DJD of lumbar spine, history of iron deficiency anemia, and pernicious anemia who presents to establish care regarding anemia.    He reports history of anemia after being initiated on anti retroviral therapy for HIV starting 2012. Has required iron infusion over the last summer, Feraheme 7/31/2024. Currently on B12 injection for the past year. Was noted to have anti parietal antibody positive.     He denies evidence of bleeding. Last colonoscopy 9/29/2022 by Dr. Tavares Barajas, noted to have diverticulum distal ileum, normal from cecum to rectum, normal rectal retroflexion. Next one due in 10 years.    Endorses increased fatigue. Also notes intermittent swelling of the R calf, associated with erythema and pain. Denies smoking, alcohol use, drug use. Currently works for the [employer], remains immobile during the day, works from home. Family history significant for father with sudden cardiac death and potential leukemia at age 75, mother with dementia and anemia.    Interval History 03/11/2025   Mr. Ojeda presents for follow up. He reports persistent neuropathy, for which he is receiving gabapentin, duloxetine, and topomax without relief. Continues to see neurologist. He is worried about having hashimoto's. Continues to take B12 injections.   CBC: 8/14.5/44.8/282. Folate 6.39. B12 520       Objective     Objective     Allergy:   Allergies   Allergen Reactions    Azithromycin GI Intolerance    Lyrica [Pregabalin] Other (See Comments)      Sedation, slept for 3 days after one dose    Erythromycin GI Intolerance and Diarrhea     erythromycin - Gastrointestinal upset        Current Medications:   Current Outpatient Medications   Medication Sig Dispense Refill    acyclovir (ZOVIRAX) 5 % ointment Apply  topically to the appropriate area as directed Every 4 (Four) Hours As Needed (fever blisters). 30 g 1    Bictegravir-Emtricitab-Tenofov (BIKTARVY) -25 MG per tablet Take 1 tablet by mouth Daily.      calcium carbonate (OS-SHERRI) 600 MG tablet Take 1 tablet by mouth Daily.      cyanocobalamin 1000 MCG/ML injection Inject 1 mL into the appropriate muscle as directed by prescriber Every 30 (Thirty) Days. 6 mL 1    DULoxetine (CYMBALTA) 30 MG capsule Take 1 capsule by mouth 2 (Two) Times a Day.      gabapentin (NEURONTIN) 100 MG capsule Take 1 capsule by mouth.      hydrOXYzine pamoate (VISTARIL) 25 MG capsule Take 1 capsule by mouth.      ibuprofen (ADVIL,MOTRIN) 800 MG tablet Take 1 tablet by mouth Every 6 (Six) Hours As Needed for Mild Pain. 120 tablet 1    Linzess 145 MCG capsule capsule Take 1 capsule by mouth Every Morning Before Breakfast.      Spiriva Respimat 1.25 MCG/ACT aerosol solution inhaler Inhale 2 puffs Daily.      tiZANidine (ZANAFLEX) 4 MG tablet Take 1 tablet by mouth Every 6 (Six) Hours As Needed.      topiramate (TOPAMAX) 25 MG tablet Take 1 tablet by mouth Daily.      vitamin D (ERGOCALCIFEROL) 1.25 MG (78756 UT) capsule capsule Take 1 capsule by mouth 1 (One) Time Per Week. 12 capsule 3     No current facility-administered medications for this visit.       Past Medical History:  Past Medical History:   Diagnosis Date    Acid reflux     Allergic     Most of my life    Anemia     Anxiety     Asthma     Shortness of breath    Depression     Fibromyalgia, primary 01/01/2017    Newly diagnosed last year    History of medical problems     HIV disease 04/2102    Hyperlipidemia     Hypothyroidism 06/13/24    Hashimotos thyrooditis     "Intolerance, drug     Iron deficiency     Irritable bowel syndrome     I have having adominal issues    Numbness and tingling     Osteopenia     Trigeminal neuralgia        Past Surgical History:  Past Surgical History:   Procedure Laterality Date    VASCULAR SURGERY Right 2021    VASCULAR SURGERY Left     WISDOM TOOTH EXTRACTION         Social History:  Social History     Socioeconomic History    Marital status: Single   Tobacco Use    Smoking status: Former     Current packs/day: 0.00     Average packs/day: 2.0 packs/day for 7.0 years (14.0 ttl pk-yrs)     Types: Cigarettes     Start date: 2010     Quit date: 2017     Years since quittin.1     Passive exposure: Past    Smokeless tobacco: Never   Vaping Use    Vaping status: Never Used   Substance and Sexual Activity    Alcohol use: No    Drug use: No    Sexual activity: Yes     Partners: Male     Birth control/protection: Condom         Family History:  Family History   Problem Relation Age of Onset    Hypertension Mother     Diabetes Mother     Hyperlipidemia Mother     Arthritis Mother     Obesity Mother     Osteoporosis Mother     Thyroid disease Mother     COPD Father     Asthma Maternal Grandmother     Stroke Paternal Grandmother     Diabetes Paternal Grandmother        Review of Systems:  Review of Systems   Constitutional:  Positive for fatigue.   Cardiovascular:  Positive for leg swelling.       Vital Signs:   /71   Pulse 74   Temp 98 °F (36.7 °C) (Temporal)   Resp 20   Ht 182.9 cm (72\")   Wt 95.5 kg (210 lb 9.6 oz)   SpO2 98%   BMI 28.56 kg/m²      Physical Exam:  Physical Exam  Vitals reviewed.   Constitutional:       General: He is not in acute distress.     Appearance: Normal appearance. He is not ill-appearing.      Comments: +ambulates with a walker   HENT:      Head: Normocephalic and atraumatic.      Mouth/Throat:      Mouth: Mucous membranes are moist.      Pharynx: Oropharynx is clear.   Eyes:      " "Conjunctiva/sclera: Conjunctivae normal.      Pupils: Pupils are equal, round, and reactive to light.   Cardiovascular:      Rate and Rhythm: Normal rate and regular rhythm.      Heart sounds: No murmur heard.  Pulmonary:      Effort: Pulmonary effort is normal. No respiratory distress.      Breath sounds: Normal breath sounds. No wheezing.   Abdominal:      General: Abdomen is flat. Bowel sounds are normal. There is no distension.      Palpations: Abdomen is soft. There is no mass.      Tenderness: There is no abdominal tenderness. There is no guarding.   Musculoskeletal:         General: No swelling. Normal range of motion.      Cervical back: Normal range of motion.   Lymphadenopathy:      Cervical: No cervical adenopathy.   Skin:     General: Skin is warm and dry.   Neurological:      General: No focal deficit present.      Mental Status: He is alert and oriented to person, place, and time. Mental status is at baseline.   Psychiatric:         Mood and Affect: Mood normal.         PHQ-9 Score  PHQ-9 Total Score:       Pain Score  Vitals:    03/11/25 1408   BP: 112/71   Pulse: 74   Resp: 20   Temp: 98 °F (36.7 °C)   TempSrc: Temporal   SpO2: 98%   Weight: 95.5 kg (210 lb 9.6 oz)   Height: 182.9 cm (72\")   PainSc: 8    PainLoc: Back         ECOG score: 0           PAINSCOREQUALITYMETRIC: Fei Ojeda reports a pain score of 8.  Given his pain assessment as noted, treatment options were discussed and the following options were decided upon as a follow-up plan to address the patient's pain: continuation of current treatment plan for pain.     Lab Review  Lab Results   Component Value Date    WBC 8.20 03/11/2025    HGB 14.5 03/11/2025    HCT 44.8 03/11/2025    .4 (H) 03/11/2025    RDW 12.0 (L) 03/11/2025     03/11/2025    NEUTRORELPCT 57.4 03/11/2025    LYMPHORELPCT 26.8 03/11/2025    MONORELPCT 12.4 (H) 03/11/2025    EOSRELPCT 2.6 03/11/2025    BASORELPCT 0.7 03/11/2025    NEUTROABS 4.70 03/11/2025    " LYMPHSABS 2.20 03/11/2025       Lab Results   Component Value Date     01/15/2025    K 4.5 01/15/2025    CO2 28.7 01/15/2025     01/15/2025    BUN 18 01/15/2025    CREATININE 1.14 01/15/2025    EGFRIFNONA >60 03/21/2022    EGFRIFAFRI >60 03/21/2022    GLUCOSE 92 01/15/2025    CALCIUM 9.3 01/15/2025    ALKPHOS 53 01/15/2025    AST 16 01/15/2025    ALT 14 01/15/2025    BILITOT 0.3 01/15/2025    ALBUMIN 4.1 01/15/2025    PROTEINTOT 7.2 01/15/2025    MG 2.2 09/26/2022     Lab Results   Component Value Date    FERRITIN 108.10 12/09/2024    IRON 33 (L) 03/11/2025    TIBC 343 03/11/2025    LABIRON 10 (L) 03/11/2025    [id number] 520 03/11/2025    FOLATE 6.39 03/11/2025     10/21/2024          Radiology Results  DEXA Assess Fracture Vertebral (10/07/2024 14:11)   FINDINGS:   Bone mineral densities were calculated and T-scores were  generated. The lowest T score is left femur -1.2 in the   left femur.  This is compatible with  osteopenia.     IMPRESSION:  According to the World Health Organization definitions of  osteoporosis based on bone density, this patient's bone mineral density  is compatible with  osteopenia and the fracture risk is moderate.  Osteopenia in the left femur    MRI Lumbar Spine With & Without Contrast (08/24/2024 11:12)   FINDINGS:   Bone marrow signal is heterogeneous, however there is no  discrete lesion or abnormal enhancement. The lumbar vertebral bodies  maintain a normal height and alignment. The posterior elements are  intact throughout.     At the L4/5 level there is a broad-based disc bulge and facet  arthropathy, however there is no significant spinal stenosis or neural  foraminal narrowing.     At the L5/S1 level there is a broad-based disc bulge and facet  arthropathy without significant spinal stenosis or neural foraminal  narrowing.     The spinal cord terminates at the L1 level. No conus lesions are  present. There is no pathologic enhancement. The paraspinal soft tissues  are  unremarkable.     IMPRESSION:  1. Degenerative change in the lower lumbar spine without significant  spinal stenosis or neural foraminal narrowing.  2. Heterogeneous bone marrow signal without a discrete lesion or  pathologic enhancement which can be seen in the setting of anemia,  smoking history or lymphoproliferative disorder.    Pathology:     APCA+IF AB Reflex (04/12/2023 15:36)         Endoscopy:     COLONOSCOPY - SCAN - COLONOSCOPY,CLARENCE LEE ELOY,[id number] (09/29/2022)           Assessment / Plan         Assessment and Plan   Fei Ojeda is a 49 y.o. presents for     Anemia   Iron deficiency anemia  Pernicious anemia  HIV, controlled on anti retroviral therapy   -Patient with progressive history of anemia, thought to be due to anti retroviral therapy which began in 2012. Work up has revealed parietal antibody positive (99.3), elevated MMA (1230) April 2023, and low iron with ferritin (9 ng/mL) June 2024.   -Denies bleeding, last colonoscopy 2022 as above, no evidence of malignancy. Next one due in 10 years.  -No previous blood transfusion or bone marrow biopsy  -Required Feraheme July 2024 with improvement in symptoms. Previously attempted oral iron supplements with significant constipation for >10 days.   -Has been on cyanocobalamin injection 1000 mcg monthly  -Today's lab work showed declining folate (6.39); start him on oral folic acid 1 mg daily   -Work up showed replete iron, folate, b12, no M spike, elevated free kappa light chain (23.6), free lambda light chain (12.7), K/L free light chains ratio (1.86).         Discussed possible differential diagnoses, testing, treatment, recommended non-pharmacological interventions, risks, warning signs to monitor for that would indicate need for follow-up in clinic or ER. If no improvement with these regimens or you have new or worsening symptoms follow-up. Patient verbalizes understanding and agreement with plan of care. Denies further needs or concerns.      Patient was given instructions and counseling regarding her condition and for health maintenance advised.       All questions were answered to his satisfaction.        ACO / SAFIA/Other  Quality measures  -  Fei Ojeda did not receive 2024 flu vaccine.  This was recommended.  -  Fei Ojeda reports a pain score of 8.  Given his pain assessment as noted, treatment options were discussed and the following options were decided upon as a follow-up plan to address the patient's pain: continuation of current treatment plan for pain.  -  Current outpatient and discharge medications have been reconciled for the patient.  Reviewed by: Juliette Jackson MD          Meds ordered during this visit  No orders of the defined types were placed in this encounter.      Visit Diagnoses    ICD-10-CM ICD-9-CM   1. Iron deficiency  E61.1 280.9   2. Folate deficiency  E53.8 266.2   3. Pernicious anemia  D51.0 281.0         Follow Up   In 3 months with repeat CBC, iron studies, ferritin, B12, folate, MMA, STR        This document has been electronically signed by Juliette Jackson MD   March 12, 2025 09:45 EDT    Dictated Utilizing Dragon Dictation: Part of this note may be an electronic transcription/translation of spoken language to printed text using the Dragon Dictation System.

## 2025-03-11 NOTE — PROGRESS NOTES
"Chief Complaint  No chief complaint on file.    Subjective        Fei Ojeda presents to [facility] HEMATOLOGY & ONCOLOGY  History of Present Illness    Objective   Vital Signs:  There were no vitals taken for this visit.  Estimated body mass index is 28.2 kg/m² as calculated from the following:    Height as of 2/18/25: 182.9 cm (72.01\").    Weight as of 2/18/25: 94.3 kg (208 lb).            Physical Exam   Result Review :                Assessment and Plan   Diagnoses and all orders for this visit:    1. Iron deficiency  -     CBC & Differential  -     Endomysial Antibody IgA  -     Tissue Transglutaminase, IgA  -     Anti-parietal Antibody  -     Intrinsic Factor Ab  -     Iron Profile  -     Folate  -     Vitamin B12    2. Pernicious anemia  -     CBC & Differential  -     Endomysial Antibody IgA  -     Tissue Transglutaminase, IgA  -     Anti-parietal Antibody  -     Intrinsic Factor Ab  -     Iron Profile  -     Folate  -     Vitamin B12    3. Anemia, unspecified type  -     CBC & Differential  -     Endomysial Antibody IgA  -     Tissue Transglutaminase, IgA  -     Anti-parietal Antibody  -     Intrinsic Factor Ab  -     Iron Profile  -     Folate  -     Vitamin B12             Follow Up   No follow-ups on file.  Patient was given instructions and counseling regarding his condition or for health maintenance advice. Please see specific information pulled into the AVS if appropriate.             "

## 2025-03-12 ENCOUNTER — TELEPHONE (OUTPATIENT)
Dept: ONCOLOGY | Facility: CLINIC | Age: 50
End: 2025-03-12
Payer: COMMERCIAL

## 2025-03-12 LAB
FERRITIN SERPL-MCNC: 62.63 NG/ML (ref 30–400)
FOLATE SERPL-MCNC: 6.39 NG/ML (ref 4.78–24.2)
PCA AB SER-ACNC: 102.9 UNITS (ref 0–20)
TTG IGA SER-ACNC: <2 U/ML (ref 0–3)
VIT B12 BLD-MCNC: 520 PG/ML (ref 211–946)

## 2025-03-12 RX ORDER — FOLIC ACID 1 MG/1
1 TABLET ORAL DAILY
Qty: 30 TABLET | Refills: 3 | Status: SHIPPED | OUTPATIENT
Start: 2025-03-12

## 2025-03-12 NOTE — TELEPHONE ENCOUNTER
RN attempted to call patient to discuss lab results. Unfortunately, RN reached voicemail and left a message asking the patient to call back to discuss.

## 2025-03-13 LAB
ENDOMYSIUM IGA SER QL: NEGATIVE
IF BLOCK AB SER-ACNC: 1 AU/ML (ref 0–1.1)

## 2025-06-01 DIAGNOSIS — E55.9 VITAMIN D DEFICIENCY: ICD-10-CM

## 2025-06-02 RX ORDER — ERGOCALCIFEROL 1.25 MG/1
50000 CAPSULE, LIQUID FILLED ORAL WEEKLY
Qty: 12 CAPSULE | Refills: 0 | Status: SHIPPED | OUTPATIENT
Start: 2025-06-02

## 2025-06-02 RX ORDER — ACYCLOVIR 50 MG/G
OINTMENT TOPICAL
Qty: 30 G | Refills: 0 | Status: SHIPPED | OUTPATIENT
Start: 2025-06-02

## 2025-06-18 ENCOUNTER — TELEPHONE (OUTPATIENT)
Dept: NEUROLOGY | Facility: CLINIC | Age: 50
End: 2025-06-18
Payer: COMMERCIAL

## 2025-06-18 NOTE — TELEPHONE ENCOUNTER
"TRANSFER OF CARE REQUEST    Caller: Fei Ojeda \"James\"    Relationship: Self    Best call back number: [phone]    Who is your current provider?: ASHWINI BUNN    Is your current provider offboarding?: NO    Who would you like your new provider to be?: ASHWINI QUINTERO    What are your reasons for transferring care?: PT STATES HIS PERIPHERAL NEUROPATHY IS RELATED TO HIS HIV DIAGNOSES. HE STATES HE HAS DONE SOME RESEARCH ONLINE AND BELIEVES ASHWINI MENDES HAS A BACKGROUND KNOWLEDGE IN HIV, THEREFORE, HE WOULD LIKE TO TRANSFER CARE.    Additional notes: PT NOTES AFTER SEEING ASHWINI BUNN, HE DID TRANSFER CARE TO [facility] NEUROLOGIST, DR FUNMILAYO FROST, AS HE MOVED CLOSER TO HIS MOTHER WHILE SHE WAS ILL. PT STATES HE HAS NOT RESPONDED WELL TO THE MEDICATIONS DR. FROST HAS PRESCRIBED AND HE WOULD LIKE TO TRANSFER CARE BACK TO [facility].    ADVISED PT THAT TRANSFER OF CARE REQUEST TYPICALLY HAS TO BE APPROVED BY HIS PREVIOUS PROVIDER AND REQUESTED PROVIDER, AS WELL AS THE PRACTICE MANAGER.     PLEASE REVIEW AND ADVISE.  "

## 2025-06-18 NOTE — TELEPHONE ENCOUNTER
Called and let patient know that Yi is not an HIV specialist and she recommend he stays with current neurologist if they have specialized training with HIV/AIDS.

## 2025-06-19 ENCOUNTER — PATIENT MESSAGE (OUTPATIENT)
Dept: INTERNAL MEDICINE | Facility: CLINIC | Age: 50
End: 2025-06-19
Payer: COMMERCIAL

## 2025-06-19 DIAGNOSIS — R20.2 NUMBNESS AND TINGLING OF RIGHT ARM: Primary | ICD-10-CM

## 2025-06-19 DIAGNOSIS — R20.0 LEFT FACIAL NUMBNESS: ICD-10-CM

## 2025-06-19 DIAGNOSIS — R20.0 NUMBNESS AND TINGLING OF RIGHT ARM: Primary | ICD-10-CM

## 2025-06-19 DIAGNOSIS — R20.2 NUMBNESS AND TINGLING IN LEFT ARM: ICD-10-CM

## 2025-06-19 DIAGNOSIS — G57.93 NEUROPATHY INVOLVING BOTH LOWER EXTREMITIES: ICD-10-CM

## 2025-06-19 DIAGNOSIS — R20.0 NUMBNESS AND TINGLING IN LEFT ARM: ICD-10-CM

## 2025-06-19 DIAGNOSIS — R29.6 FALLING EPISODES: ICD-10-CM

## 2025-06-24 DIAGNOSIS — D51.0 PERNICIOUS ANEMIA: ICD-10-CM

## 2025-06-24 DIAGNOSIS — E53.8 FOLATE DEFICIENCY: ICD-10-CM

## 2025-06-24 DIAGNOSIS — D64.9 ANEMIA, UNSPECIFIED TYPE: ICD-10-CM

## 2025-06-24 DIAGNOSIS — E61.1 IRON DEFICIENCY: Primary | ICD-10-CM

## 2025-06-24 NOTE — PROGRESS NOTES
Subjective     Date: 6/25/2025    Referring Provider  No ref. provider found    Chief Complaint  Pernicious anemia   Iron deficiency anemia     Subjective          Fei Ojeda is a 50 y.o. male who presents today to [facility] HEMATOLOGY & ONCOLOGY for follow up.    HPI:   50 y.o.male with past medical history of HIV (controlled/undetectable on Biktarvy), peripheral neuropathy, hashimoto's, anxiety, depression, PTSD, allergic rhinitis, chronic fatigue, fibromyalgia, DJD of lumbar spine, history of iron deficiency anemia, and pernicious anemia who presents to establish care regarding anemia.    He reports history of anemia after being initiated on anti retroviral therapy for HIV starting 2012. Has required iron infusion over the last summer, Feraheme 7/31/2024. Currently on B12 injection for the past year. Was noted to have anti parietal antibody positive.     He denies evidence of bleeding. Last colonoscopy 9/29/2022 by Dr. Tavares Barajas, noted to have diverticulum distal ileum, normal from cecum to rectum, normal rectal retroflexion. Next one due in 10 years.    Endorses increased fatigue. Also notes intermittent swelling of the R calf, associated with erythema and pain. Denies smoking, alcohol use, drug use. Currently works for the [employer], remains immobile during the day, works from home. Family history significant for father with sudden cardiac death and potential leukemia at age 75, mother with dementia and anemia.    Interval History 03/11/2025   Mr. Ojeda presents for follow up. He reports persistent neuropathy, for which he is receiving gabapentin, duloxetine, and topomax without relief. Continues to see neurologist. He is worried about having hashimoto's. Continues to take B12 injections.   CBC: 8/14.5/44.8/282. Folate 6.39. B12 520     Interval History 06/25/2025   Mr. Ojeda presents for follow up. He reports persistent neuropathy, which worsens when sitting for long periods due  to increased swelling. Due to this, he had to quit his job which required him to sit for long periods. He was started on lamictal in addition to gabapentin, duloxetine, topomax, which made him feel dizzy. Seeing neurologist tomorrow.   Continues to take B12 injections monthly. Seeing Endocrine tomorrow for potential Hashimoto's.   CBC: 8.9/13.9/42/289.  Ferritin 53 ng/mL      Objective     Objective     Allergy:   Allergies   Allergen Reactions    Azithromycin GI Intolerance    Lyrica [Pregabalin] Other (See Comments)     Sedation, slept for 3 days after one dose    Erythromycin GI Intolerance and Diarrhea     erythromycin - Gastrointestinal upset        Current Medications:   Current Outpatient Medications   Medication Sig Dispense Refill    acyclovir (ZOVIRAX) 5 % ointment APPLY TOPICALLY TO AFFECTED AREA(S) EVERY 4 HOURS AS NEEDED 30 g 0    Bictegravir-Emtricitab-Tenofov (BIKTARVY) -25 MG per tablet Take 1 tablet by mouth Daily.      calcium carbonate (OS-SHERRI) 600 MG tablet Take 1 tablet by mouth Daily.      cyanocobalamin 1000 MCG/ML injection Inject 1 mL into the appropriate muscle as directed by prescriber Every 30 (Thirty) Days. 6 mL 1    DULoxetine (CYMBALTA) 30 MG capsule Take 1 capsule by mouth 2 (Two) Times a Day.      folic acid (FOLVITE) 1 MG tablet Take 1 tablet by mouth Daily. 30 tablet 3    gabapentin (NEURONTIN) 100 MG capsule Take 1 capsule by mouth.      hydrOXYzine pamoate (VISTARIL) 25 MG capsule Take 1 capsule by mouth.      ibuprofen (ADVIL,MOTRIN) 800 MG tablet Take 1 tablet by mouth Every 6 (Six) Hours As Needed for Mild Pain. 120 tablet 1    Linzess 145 MCG capsule capsule Take 1 capsule by mouth Every Morning Before Breakfast.      rOPINIRole (REQUIP) 0.5 MG tablet Take 1 tablet by mouth 3 (Three) Times a Day.      Spiriva Respimat 1.25 MCG/ACT aerosol solution inhaler Inhale 2 puffs Daily.      tiZANidine (ZANAFLEX) 4 MG tablet Take 1 tablet by mouth Every 6 (Six) Hours As Needed.       vitamin D (ERGOCALCIFEROL) 1.25 MG (74572 UT) capsule capsule Take 1 capsule by mouth once a week 12 capsule 0    topiramate (TOPAMAX) 25 MG tablet Take 1 tablet by mouth Daily. (Patient not taking: Reported on 2025)       No current facility-administered medications for this visit.       Past Medical History:  Past Medical History:   Diagnosis Date    Acid reflux     Allergic     Most of my life    Anemia     Anxiety     Asthma     Shortness of breath    Depression     Fibromyalgia, primary 2017    Newly diagnosed last year    History of medical problems     HIV disease 2102    Hyperlipidemia     Hypothyroidism 24    Hashimotos thyrooditis    Intolerance, drug     Iron deficiency     Irritable bowel syndrome     I have having adominal issues    Numbness and tingling     Osteopenia     Trigeminal neuralgia        Past Surgical History:  Past Surgical History:   Procedure Laterality Date    VASCULAR SURGERY Right 2021    VASCULAR SURGERY Left     WISDOM TOOTH EXTRACTION         Social History:  Social History     Socioeconomic History    Marital status: Single   Tobacco Use    Smoking status: Former     Current packs/day: 0.00     Average packs/day: 2.0 packs/day for 7.0 years (14.0 ttl pk-yrs)     Types: Cigarettes     Start date: 2010     Quit date: 2017     Years since quittin.4     Passive exposure: Past    Smokeless tobacco: Never   Vaping Use    Vaping status: Never Used   Substance and Sexual Activity    Alcohol use: No    Drug use: No    Sexual activity: Yes     Partners: Male     Birth control/protection: Condom         Family History:  Family History   Problem Relation Age of Onset    Hypertension Mother     Diabetes Mother     Hyperlipidemia Mother     Arthritis Mother     Obesity Mother     Osteoporosis Mother     Thyroid disease Mother     COPD Father     Asthma Maternal Grandmother     Stroke Paternal Grandmother     Diabetes Paternal Grandmother        Review of  "Systems:  Review of Systems   Constitutional:  Positive for fatigue.   Cardiovascular:  Positive for leg swelling.       Vital Signs:   /72   Pulse 77   Temp 98 °F (36.7 °C) (Temporal)   Resp 20   Ht 182.9 cm (72\")   Wt 94.6 kg (208 lb 9.6 oz)   SpO2 97%   BMI 28.29 kg/m²      Physical Exam:  Physical Exam  Vitals reviewed.   Constitutional:       General: He is not in acute distress.     Appearance: Normal appearance. He is not ill-appearing.      Comments: +ambulates with a walker   HENT:      Head: Normocephalic and atraumatic.      Mouth/Throat:      Mouth: Mucous membranes are moist.      Pharynx: Oropharynx is clear.   Eyes:      Conjunctiva/sclera: Conjunctivae normal.      Pupils: Pupils are equal, round, and reactive to light.   Cardiovascular:      Rate and Rhythm: Normal rate and regular rhythm.      Heart sounds: No murmur heard.  Pulmonary:      Effort: Pulmonary effort is normal. No respiratory distress.      Breath sounds: Normal breath sounds. No wheezing.   Abdominal:      General: Abdomen is flat. Bowel sounds are normal. There is no distension.      Palpations: Abdomen is soft. There is no mass.      Tenderness: There is no abdominal tenderness. There is no guarding.   Musculoskeletal:         General: No swelling. Normal range of motion.      Cervical back: Normal range of motion.   Lymphadenopathy:      Cervical: No cervical adenopathy.   Skin:     General: Skin is warm and dry.   Neurological:      General: No focal deficit present.      Mental Status: He is alert and oriented to person, place, and time. Mental status is at baseline.   Psychiatric:         Mood and Affect: Mood normal.         PHQ-9 Score  PHQ-9 Total Score:       Pain Score  Vitals:    06/25/25 1349   BP: 107/72   Pulse: 77   Resp: 20   Temp: 98 °F (36.7 °C)   TempSrc: Temporal   SpO2: 97%   Weight: 94.6 kg (208 lb 9.6 oz)   Height: 182.9 cm (72\")   PainSc: 8    PainLoc: Back           ECOG score: 0       "     PAINSCOREQUALITYMETRIC: Fei Ojeda reports a pain score of 8.  Given his pain assessment as noted, treatment options were discussed and the following options were decided upon as a follow-up plan to address the patient's pain: continuation of current treatment plan for pain.     Lab Review  Lab Results   Component Value Date    WBC 8.91 06/25/2025    HGB 13.9 06/25/2025    HCT 42.4 06/25/2025    MCV 98.8 (H) 06/25/2025    RDW 12.3 06/25/2025     06/25/2025    NEUTRORELPCT 57.2 06/25/2025    LYMPHORELPCT 30.1 06/25/2025    MONORELPCT 9.0 06/25/2025    EOSRELPCT 2.6 06/25/2025    BASORELPCT 1.0 06/25/2025    NEUTROABS 5.10 06/25/2025    LYMPHSABS 2.68 06/25/2025       Lab Results   Component Value Date     03/11/2025    K 4.6 03/11/2025    CO2 20 03/11/2025     03/11/2025    BUN 11 03/11/2025    CREATININE 1.35 (H) 03/11/2025    EGFRIFNONA >60 03/21/2022    EGFRIFAFRI >60 03/21/2022    GLUCOSE 94 03/11/2025    CALCIUM 9.3 03/11/2025    ALKPHOS 64 03/11/2025    AST 22 03/11/2025    ALT 19 03/11/2025    BILITOT <0.2 03/11/2025    ALBUMIN 4.7 03/11/2025    PROTEINTOT 7.2 03/11/2025    MG 2.2 09/26/2022     Lab Results   Component Value Date    FERRITIN 53.90 06/25/2025    IRON 66 06/25/2025    TIBC 341 06/25/2025    LABIRON 19 (L) 06/25/2025    [id number] 520 03/11/2025    FOLATE 6.39 03/11/2025     10/21/2024          Radiology Results  DEXA Assess Fracture Vertebral (10/07/2024 14:11)   FINDINGS:   Bone mineral densities were calculated and T-scores were  generated. The lowest T score is left femur -1.2 in the   left femur.  This is compatible with  osteopenia.     IMPRESSION:  According to the World Health Organization definitions of  osteoporosis based on bone density, this patient's bone mineral density  is compatible with  osteopenia and the fracture risk is moderate.  Osteopenia in the left femur    MRI Lumbar Spine With & Without Contrast (08/24/2024 11:12)   FINDINGS:   Bone marrow  signal is heterogeneous, however there is no  discrete lesion or abnormal enhancement. The lumbar vertebral bodies  maintain a normal height and alignment. The posterior elements are  intact throughout.     At the L4/5 level there is a broad-based disc bulge and facet  arthropathy, however there is no significant spinal stenosis or neural  foraminal narrowing.     At the L5/S1 level there is a broad-based disc bulge and facet  arthropathy without significant spinal stenosis or neural foraminal  narrowing.     The spinal cord terminates at the L1 level. No conus lesions are  present. There is no pathologic enhancement. The paraspinal soft tissues  are unremarkable.     IMPRESSION:  1. Degenerative change in the lower lumbar spine without significant  spinal stenosis or neural foraminal narrowing.  2. Heterogeneous bone marrow signal without a discrete lesion or  pathologic enhancement which can be seen in the setting of anemia,  smoking history or lymphoproliferative disorder.    Pathology:     APCA+IF AB Reflex (04/12/2023 15:36)         Endoscopy:     COLONOSCOPY - SCAN - COLONOSCOPY,CLARENCE LYONS,[id number] (09/29/2022)           Assessment / Plan         Assessment and Plan   Fei Ojeda is a 50 y.o. presents for     Anemia   Iron deficiency anemia  Pernicious anemia  HIV, controlled on anti retroviral therapy   -Patient with progressive history of anemia, thought to be due to anti retroviral therapy which began in 2012. Work up has revealed parietal antibody positive (99.3), elevated MMA (1230) April 2023, and low iron with ferritin (9 ng/mL) June 2024.   -Denies bleeding, last colonoscopy 2022 as above, no evidence of malignancy. Next one due in 10 years.  -No previous blood transfusion or bone marrow biopsy  -Required Feraheme July 2024 with improvement in symptoms. Previously attempted oral iron supplements with significant constipation for >10 days.   -Has been on cyanocobalamin injection 1000 mcg monthly  and on daily oral folic acid 1 mg  -Today's lab work with declining iron studies without anemia, ferritin 52 ng/mL. Prescribed ferumoxytol  mg D1, 8.   -Work up showed replete iron, folate, b12, no M spike, elevated free kappa light chain (23.6), free lambda light chain (12.7), K/L free light chains ratio (1.86).         Discussed possible differential diagnoses, testing, treatment, recommended non-pharmacological interventions, risks, warning signs to monitor for that would indicate need for follow-up in clinic or ER. If no improvement with these regimens or you have new or worsening symptoms follow-up. Patient verbalizes understanding and agreement with plan of care. Denies further needs or concerns.     Patient was given instructions and counseling regarding her condition and for health maintenance advised.       All questions were answered to his satisfaction.        ACO / SAFIA/Other  Quality measures  -  Fei Ojeda did not receive 2024 flu vaccine.  This was recommended.  -  Fei Ojeda reports a pain score of 8.  Given his pain assessment as noted, treatment options were discussed and the following options were decided upon as a follow-up plan to address the patient's pain: continuation of current treatment plan for pain.  -  Current outpatient and discharge medications have been reconciled for the patient.  Reviewed by: Juliette Jackson MD          Meds ordered during this visit  No orders of the defined types were placed in this encounter.      Visit Diagnoses    ICD-10-CM ICD-9-CM   1. Iron deficiency  E61.1 280.9   2. Pernicious anemia  D51.0 281.0   3. Folate deficiency  E53.8 266.2   4. Asymptomatic HIV infection, with no history of HIV-related illness  Z21 V08   5. Adverse reaction to ferrous sulfate  T45.4X5A 995.29   6. Leg swelling  M79.89 729.81   7. Malabsorption due to intolerance, not elsewhere classified  K90.49 579.8           Follow Up   In 4 months with repeat CBC, iron studies,  ferritin, B12, folate, MMA, STR        This document has been electronically signed by Juliette Jackson MD   June 25, 2025 14:42 EDT    Dictated Utilizing Dragon Dictation: Part of this note may be an electronic transcription/translation of spoken language to printed text using the Dragon Dictation System.

## 2025-06-25 ENCOUNTER — LAB (OUTPATIENT)
Dept: ONCOLOGY | Facility: CLINIC | Age: 50
End: 2025-06-25
Payer: COMMERCIAL

## 2025-06-25 ENCOUNTER — RESULTS FOLLOW-UP (OUTPATIENT)
Dept: ONCOLOGY | Facility: CLINIC | Age: 50
End: 2025-06-25
Payer: COMMERCIAL

## 2025-06-25 ENCOUNTER — OFFICE VISIT (OUTPATIENT)
Dept: ONCOLOGY | Facility: CLINIC | Age: 50
End: 2025-06-25
Payer: COMMERCIAL

## 2025-06-25 VITALS
SYSTOLIC BLOOD PRESSURE: 107 MMHG | HEART RATE: 77 BPM | TEMPERATURE: 98 F | OXYGEN SATURATION: 97 % | HEIGHT: 72 IN | RESPIRATION RATE: 20 BRPM | DIASTOLIC BLOOD PRESSURE: 72 MMHG | BODY MASS INDEX: 28.25 KG/M2 | WEIGHT: 208.6 LBS

## 2025-06-25 DIAGNOSIS — M79.89 LEG SWELLING: ICD-10-CM

## 2025-06-25 DIAGNOSIS — D64.9 ANEMIA, UNSPECIFIED TYPE: ICD-10-CM

## 2025-06-25 DIAGNOSIS — E53.8 FOLATE DEFICIENCY: ICD-10-CM

## 2025-06-25 DIAGNOSIS — Z21 ASYMPTOMATIC HIV INFECTION, WITH NO HISTORY OF HIV-RELATED ILLNESS: ICD-10-CM

## 2025-06-25 DIAGNOSIS — D51.0 PERNICIOUS ANEMIA: ICD-10-CM

## 2025-06-25 DIAGNOSIS — T45.4X5A: ICD-10-CM

## 2025-06-25 DIAGNOSIS — K90.49 MALABSORPTION DUE TO INTOLERANCE, NOT ELSEWHERE CLASSIFIED: ICD-10-CM

## 2025-06-25 DIAGNOSIS — E61.1 IRON DEFICIENCY: Primary | ICD-10-CM

## 2025-06-25 DIAGNOSIS — E61.1 IRON DEFICIENCY: ICD-10-CM

## 2025-06-25 PROBLEM — D50.9 IRON DEFICIENCY ANEMIA: Status: ACTIVE | Noted: 2025-06-25

## 2025-06-25 LAB
BASOPHILS # BLD AUTO: 0.09 10*3/MM3 (ref 0–0.2)
BASOPHILS NFR BLD AUTO: 1 % (ref 0–1.5)
DEPRECATED RDW RBC AUTO: 44.8 FL (ref 37–54)
EOSINOPHIL # BLD AUTO: 0.23 10*3/MM3 (ref 0–0.4)
EOSINOPHIL NFR BLD AUTO: 2.6 % (ref 0.3–6.2)
ERYTHROCYTE [DISTWIDTH] IN BLOOD BY AUTOMATED COUNT: 12.3 % (ref 12.3–15.4)
FERRITIN SERPL-MCNC: 53.9 NG/ML (ref 30–400)
HCT VFR BLD AUTO: 42.4 % (ref 37.5–51)
HGB BLD-MCNC: 13.9 G/DL (ref 13–17.7)
IMM GRANULOCYTES # BLD AUTO: 0.01 10*3/MM3 (ref 0–0.05)
IMM GRANULOCYTES NFR BLD AUTO: 0.1 % (ref 0–0.5)
IRON 24H UR-MRATE: 66 MCG/DL (ref 59–158)
IRON SATN MFR SERPL: 19 % (ref 20–50)
LYMPHOCYTES # BLD AUTO: 2.68 10*3/MM3 (ref 0.7–3.1)
LYMPHOCYTES NFR BLD AUTO: 30.1 % (ref 19.6–45.3)
MCH RBC QN AUTO: 32.4 PG (ref 26.6–33)
MCHC RBC AUTO-ENTMCNC: 32.8 G/DL (ref 31.5–35.7)
MCV RBC AUTO: 98.8 FL (ref 79–97)
MONOCYTES # BLD AUTO: 0.8 10*3/MM3 (ref 0.1–0.9)
MONOCYTES NFR BLD AUTO: 9 % (ref 5–12)
NEUTROPHILS NFR BLD AUTO: 5.1 10*3/MM3 (ref 1.7–7)
NEUTROPHILS NFR BLD AUTO: 57.2 % (ref 42.7–76)
NRBC BLD AUTO-RTO: 0 /100 WBC (ref 0–0.2)
PLATELET # BLD AUTO: 289 10*3/MM3 (ref 140–450)
PMV BLD AUTO: 9.7 FL (ref 6–12)
RBC # BLD AUTO: 4.29 10*6/MM3 (ref 4.14–5.8)
TIBC SERPL-MCNC: 341 MCG/DL (ref 298–536)
TRANSFERRIN SERPL-MCNC: 229 MG/DL (ref 200–360)
WBC NRBC COR # BLD AUTO: 8.91 10*3/MM3 (ref 3.4–10.8)

## 2025-06-25 PROCEDURE — 83921 ORGANIC ACID SINGLE QUANT: CPT | Performed by: INTERNAL MEDICINE

## 2025-06-25 PROCEDURE — 83540 ASSAY OF IRON: CPT | Performed by: INTERNAL MEDICINE

## 2025-06-25 PROCEDURE — 82728 ASSAY OF FERRITIN: CPT | Performed by: INTERNAL MEDICINE

## 2025-06-25 PROCEDURE — 84466 ASSAY OF TRANSFERRIN: CPT | Performed by: INTERNAL MEDICINE

## 2025-06-25 PROCEDURE — 84238 ASSAY NONENDOCRINE RECEPTOR: CPT | Performed by: INTERNAL MEDICINE

## 2025-06-25 PROCEDURE — 82607 VITAMIN B-12: CPT | Performed by: INTERNAL MEDICINE

## 2025-06-25 PROCEDURE — 85025 COMPLETE CBC W/AUTO DIFF WBC: CPT | Performed by: INTERNAL MEDICINE

## 2025-06-25 PROCEDURE — 82746 ASSAY OF FOLIC ACID SERUM: CPT | Performed by: INTERNAL MEDICINE

## 2025-06-25 RX ORDER — ROPINIROLE 0.5 MG/1
0.5 TABLET, FILM COATED ORAL 3 TIMES DAILY
COMMUNITY
Start: 2025-04-22

## 2025-06-25 NOTE — PROGRESS NOTES
Venipuncture Blood Specimen Collection  Venipuncture performed in left arm by Joaquina Chaves MA with good hemostasis. Patient tolerated the procedure well without complications.   06/25/25   Joaquina Chaves MA

## 2025-06-25 NOTE — TELEPHONE ENCOUNTER
----- Message from Juliette Jackson sent at 6/25/2025  2:39 PM EDT -----  I attempted to call him without answer, but he will require parenteral iron which I've ordered.     TY!  ----- Message -----  From: Lab, Background User  Sent: 6/25/2025   1:52 PM EDT  To: Juliette Jackson MD

## 2025-06-25 NOTE — TELEPHONE ENCOUNTER
RN spoke with patient and informed him of lab results and that MD has ordered for him to receive IV iron. RN explained that as soon as insurance authorization is obtained, someone will call him to get his infusion scheduled. He voiced understandings and was agreeable.

## 2025-06-25 NOTE — TELEPHONE ENCOUNTER
RN attempted to contact patient. Unfortunately, RN reached voicemail and left a message for patient asking them to please call back to discuss.

## 2025-06-26 LAB
FOLATE SERPL-MCNC: 16.7 NG/ML (ref 4.78–24.2)
VIT B12 BLD-MCNC: 561 PG/ML (ref 211–946)

## 2025-06-27 LAB — STFR SERPL-SCNC: 12.7 NMOL/L (ref 12.2–27.3)

## 2025-06-29 LAB — METHYLMALONATE SERPL-SCNC: 189 NMOL/L (ref 0–378)

## 2025-07-01 DIAGNOSIS — K90.49 MALABSORPTION DUE TO INTOLERANCE, NOT ELSEWHERE CLASSIFIED: Primary | ICD-10-CM

## 2025-07-01 DIAGNOSIS — D50.9 IRON DEFICIENCY ANEMIA, UNSPECIFIED IRON DEFICIENCY ANEMIA TYPE: ICD-10-CM

## 2025-07-01 RX ORDER — FAMOTIDINE 10 MG/ML
20 INJECTION, SOLUTION INTRAVENOUS AS NEEDED
OUTPATIENT
Start: 2025-07-09

## 2025-07-01 RX ORDER — SODIUM CHLORIDE 9 MG/ML
20 INJECTION, SOLUTION INTRAVENOUS ONCE
OUTPATIENT
Start: 2025-07-16

## 2025-07-01 RX ORDER — DIPHENHYDRAMINE HYDROCHLORIDE 50 MG/ML
50 INJECTION, SOLUTION INTRAMUSCULAR; INTRAVENOUS AS NEEDED
OUTPATIENT
Start: 2025-07-09

## 2025-07-01 RX ORDER — HYDROCORTISONE SODIUM SUCCINATE 100 MG/2ML
100 INJECTION INTRAMUSCULAR; INTRAVENOUS AS NEEDED
OUTPATIENT
Start: 2025-07-16

## 2025-07-01 RX ORDER — SODIUM CHLORIDE 9 MG/ML
20 INJECTION, SOLUTION INTRAVENOUS ONCE
OUTPATIENT
Start: 2025-07-09

## 2025-07-01 RX ORDER — FAMOTIDINE 10 MG/ML
20 INJECTION, SOLUTION INTRAVENOUS AS NEEDED
OUTPATIENT
Start: 2025-07-16

## 2025-07-01 RX ORDER — DIPHENHYDRAMINE HYDROCHLORIDE 50 MG/ML
50 INJECTION, SOLUTION INTRAMUSCULAR; INTRAVENOUS AS NEEDED
OUTPATIENT
Start: 2025-07-16

## 2025-07-01 RX ORDER — HYDROCORTISONE SODIUM SUCCINATE 100 MG/2ML
100 INJECTION INTRAMUSCULAR; INTRAVENOUS AS NEEDED
OUTPATIENT
Start: 2025-07-09

## 2025-07-08 ENCOUNTER — TELEMEDICINE (OUTPATIENT)
Dept: INTERNAL MEDICINE | Facility: CLINIC | Age: 50
End: 2025-07-08
Payer: COMMERCIAL

## 2025-07-08 DIAGNOSIS — Z21 HIV INFECTION, UNSPECIFIED SYMPTOM STATUS: ICD-10-CM

## 2025-07-08 DIAGNOSIS — G62.0 DRUG-INDUCED POLYNEUROPATHY: ICD-10-CM

## 2025-07-08 DIAGNOSIS — G57.93 NEUROPATHY INVOLVING BOTH LOWER EXTREMITIES: ICD-10-CM

## 2025-07-08 DIAGNOSIS — M19.90 INFLAMMATORY ARTHRITIS: ICD-10-CM

## 2025-07-08 DIAGNOSIS — F41.9 ANXIETY: ICD-10-CM

## 2025-07-08 DIAGNOSIS — F32.1 CURRENT MODERATE EPISODE OF MAJOR DEPRESSIVE DISORDER, UNSPECIFIED WHETHER RECURRENT: ICD-10-CM

## 2025-07-08 DIAGNOSIS — R53.1 WEAKNESS: ICD-10-CM

## 2025-07-08 DIAGNOSIS — M79.7 FIBROMYALGIA: ICD-10-CM

## 2025-07-08 DIAGNOSIS — M54.50 MIDLINE LOW BACK PAIN WITHOUT SCIATICA, UNSPECIFIED CHRONICITY: ICD-10-CM

## 2025-07-08 DIAGNOSIS — R29.6 FALLING EPISODES: Primary | ICD-10-CM

## 2025-07-08 DIAGNOSIS — F43.10 POSTTRAUMATIC STRESS DISORDER: ICD-10-CM

## 2025-07-08 DIAGNOSIS — R53.83 DECREASED STAMINA: ICD-10-CM

## 2025-07-08 DIAGNOSIS — D51.0 PERNICIOUS ANEMIA: ICD-10-CM

## 2025-07-08 RX ORDER — VALACYCLOVIR HYDROCHLORIDE 500 MG/1
500 TABLET, FILM COATED ORAL DAILY
COMMUNITY
Start: 2025-07-01

## 2025-07-08 RX ORDER — AZELASTINE HYDROCHLORIDE 137 UG/1
1 SPRAY, METERED NASAL DAILY
COMMUNITY
Start: 2025-06-20

## 2025-07-08 NOTE — PROGRESS NOTES
Mode of Visit: Video  Location of patient: -HOME-  Location of provider: [facility]  You have chosen to receive care through a telehealth visit.  The patient has signed the video visit consent form.  The visit included audio and video interaction. No technical issues occurred during this visit.    Date: 2025  Name: Fei Ojeda  : 1975         Chief Complaint:   Chief Complaint   Patient presents with    Neurologic Problem    Back Pain     History of Present Illness  The patient presents via virtual visit for evaluation of arthritis, panic attacks, and iron deficiency.    He reports experiencing daily panic attacks since he stopped working on 2025, which have significantly impacted his mental health. He is currently on Vistaril, gabapentin, and duloxetine, but these medications make him feel drowsy and disoriented. He is considering trying Viibryd, pending confirmation that it does not interact with his other medications. He has been seeing a counselor biweekly for the past 5 years and has submitted her records to Social Security. He has also completed a DNA swab test. He has been attending a support group for HIV and is grieving the loss of 3 friends from the group over the past 6 months.    He reports that his arthritis in his back is worsening. He is interested in trying physical therapy to manage this condition. He has a shower chair at home for safety and convenience. He has a handicap sticker for his car and is considering getting a license plate as well. He has been using 2 four-prong canes for support. He is seeking an order for bilateral canes or forearm crutches to aid in mobility. He underwent physical therapy from January to 2025, but his insurance was discontinued on 2025. He has since obtained Medicaid coverage. He was advised to undergo therapy for his lower back pain due to arthritis, but he is hesitant due to the potential for increased fatigue. He has been  having increasing difficulty working, even in a job that requires little to no physical activity, for over a year due to chronic neuropathy, pain in legs, swelling in both legs.      His hematologist started him on folic acid because his iron was really low the last time she checked it. He is having an iron infusion tomorrow and on 07/16/2025.    He is scheduled to see an endocrinologist at the end of the month to address his Hashimoto's disease as he continues to gain weight.        History: The following portions of the patient's history were reviewed and updated as appropriate: allergies, current medications, past medical history, family history, surgical history, social history and problem list.      ROS:  Review of Systems      PE:  Physical Exam   Constitutional: He appears well-developed and well-nourished. No distress.   HENT:   Head: Normocephalic.   Right Ear: External ear normal.   Left Ear: External ear normal.   Eyes: Pupils are equal, round, and reactive to light. Conjunctivae are normal.   Neck: Neck normal appearance.  Pulmonary/Chest: Effort normal.   Neurological: He is alert.   Oriented x 3   Skin: No pallor.   Psychiatric: He has a normal mood and affect. His speech is normal and behavior is normal. Thought content normal. His affect is normal.          Assessment/Plan:  Diagnoses and all orders for this visit:    1. Falling episodes (Primary)  -     Miscellaneous DME  -     Ambulatory Referral to Physical Therapy for Evaluation & Treatment    2. Neuropathy involving both lower extremities  -     Miscellaneous DME  -     Ambulatory Referral to Physical Therapy for Evaluation & Treatment    3. Inflammatory arthritis  -     Miscellaneous DME  -     Ambulatory Referral to Physical Therapy for Evaluation & Treatment    4. Midline low back pain without sciatica, unspecified chronicity  -     Miscellaneous DME  -     Ambulatory Referral to Physical Therapy for Evaluation & Treatment    5. HIV  infection, unspecified symptom status    6. Pernicious anemia  -     Miscellaneous DME    7. Posttraumatic stress disorder  8. Current moderate episode of major depressive disorder, unspecified whether recurrent  9. Anxiety        - Encouraged to take part in daily physical exercise.          - Eat healthy, well balanced diet; avoid sugary foods or beverages        - Continue to abstain from alcohol and drugs         - Ensure good night's sleep by creating calm space in bedroom, avoiding screen time 1-2 hours before bed, no caffeine after 5 pm        - Talk to supportive family and friends, as needed        - Continue medication as prescribed.  Continue counseling.     10. Fibromyalgia  -     Miscellaneous DME  -     Ambulatory Referral to Physical Therapy for Evaluation & Treatment    11. Decreased stamina  -     Miscellaneous DME  -     Ambulatory Referral to Physical Therapy for Evaluation & Treatment    12. Weakness  -     Miscellaneous DME  -     Ambulatory Referral to Physical Therapy for Evaluation & Treatment    13. Drug-induced polyneuropathy  -     Miscellaneous DME  -     Ambulatory Referral to Physical Therapy for Evaluation & Treatment          Return for Annual.  Patient was given instructions and counseling regarding her condition or for health maintenance advice. Please see specific information pulled into the AVS if appropriate.

## 2025-07-09 ENCOUNTER — INFUSION (OUTPATIENT)
Dept: ONCOLOGY | Facility: HOSPITAL | Age: 50
End: 2025-07-09
Payer: COMMERCIAL

## 2025-07-09 VITALS
HEART RATE: 89 BPM | OXYGEN SATURATION: 94 % | WEIGHT: 201.2 LBS | DIASTOLIC BLOOD PRESSURE: 73 MMHG | RESPIRATION RATE: 18 BRPM | BODY MASS INDEX: 27.29 KG/M2 | TEMPERATURE: 98.2 F | SYSTOLIC BLOOD PRESSURE: 111 MMHG

## 2025-07-09 DIAGNOSIS — K90.49 MALABSORPTION DUE TO INTOLERANCE, NOT ELSEWHERE CLASSIFIED: Primary | ICD-10-CM

## 2025-07-09 DIAGNOSIS — D50.9 IRON DEFICIENCY ANEMIA, UNSPECIFIED IRON DEFICIENCY ANEMIA TYPE: ICD-10-CM

## 2025-07-09 PROCEDURE — 96374 THER/PROPH/DIAG INJ IV PUSH: CPT

## 2025-07-09 PROCEDURE — 25010000002 FERUMOXYTOL 510 MG/17ML SOLUTION: Performed by: INTERNAL MEDICINE

## 2025-07-09 PROCEDURE — 96365 THER/PROPH/DIAG IV INF INIT: CPT

## 2025-07-09 RX ORDER — DIPHENHYDRAMINE HYDROCHLORIDE 50 MG/ML
50 INJECTION, SOLUTION INTRAMUSCULAR; INTRAVENOUS AS NEEDED
Status: DISCONTINUED | OUTPATIENT
Start: 2025-07-09 | End: 2025-07-09 | Stop reason: HOSPADM

## 2025-07-09 RX ORDER — FAMOTIDINE 10 MG/ML
20 INJECTION, SOLUTION INTRAVENOUS AS NEEDED
Status: DISCONTINUED | OUTPATIENT
Start: 2025-07-09 | End: 2025-07-09 | Stop reason: HOSPADM

## 2025-07-09 RX ORDER — HYDROCORTISONE SODIUM SUCCINATE 100 MG/2ML
100 INJECTION INTRAMUSCULAR; INTRAVENOUS AS NEEDED
Status: DISCONTINUED | OUTPATIENT
Start: 2025-07-09 | End: 2025-07-09 | Stop reason: HOSPADM

## 2025-07-09 RX ADMIN — FERUMOXYTOL 510 MG: 510 INJECTION INTRAVENOUS at 15:02

## 2025-07-14 ENCOUNTER — TELEPHONE (OUTPATIENT)
Dept: INTERNAL MEDICINE | Facility: CLINIC | Age: 50
End: 2025-07-14
Payer: COMMERCIAL

## 2025-07-14 NOTE — TELEPHONE ENCOUNTER
"Caller: Fei Ojeda \"James\"    Relationship: Self    Best call back number: [phone]     What form or medical record are you requesting: ORDERS FOR ELBOW CRUTCHES - BI-LATEREAL     Who is requesting this form or medical record from you: SELF    How would you like to receive the form or medical records (pick-up, mail, fax): [employer] OR MAIL    If mail, what is the address:     [id number] [address] [address]       Timeframe paperwork needed: ASAP    Additional notes: PATIENT HAS ALREADY DISCUSSED THIS ORDER WITH MONICA BOYD. PATIENT WOULD LIKE TO BE TO PRINT THEM OFF OF [employer] IF AVAIL;ABLE. COULD WE SEND VIA [employer]?        "

## 2025-07-16 ENCOUNTER — INFUSION (OUTPATIENT)
Dept: ONCOLOGY | Facility: HOSPITAL | Age: 50
End: 2025-07-16
Payer: COMMERCIAL

## 2025-07-16 VITALS
HEART RATE: 71 BPM | TEMPERATURE: 97.5 F | OXYGEN SATURATION: 98 % | RESPIRATION RATE: 18 BRPM | DIASTOLIC BLOOD PRESSURE: 62 MMHG | SYSTOLIC BLOOD PRESSURE: 105 MMHG

## 2025-07-16 DIAGNOSIS — K90.49 MALABSORPTION DUE TO INTOLERANCE, NOT ELSEWHERE CLASSIFIED: Primary | ICD-10-CM

## 2025-07-16 DIAGNOSIS — D50.9 IRON DEFICIENCY ANEMIA, UNSPECIFIED IRON DEFICIENCY ANEMIA TYPE: ICD-10-CM

## 2025-07-16 PROCEDURE — 25810000003 SODIUM CHLORIDE 0.9 % SOLUTION: Performed by: INTERNAL MEDICINE

## 2025-07-16 PROCEDURE — 96374 THER/PROPH/DIAG INJ IV PUSH: CPT

## 2025-07-16 PROCEDURE — 25010000002 FERUMOXYTOL 510 MG/17ML SOLUTION: Performed by: INTERNAL MEDICINE

## 2025-07-16 PROCEDURE — 96365 THER/PROPH/DIAG IV INF INIT: CPT

## 2025-07-16 RX ORDER — SODIUM CHLORIDE 9 MG/ML
20 INJECTION, SOLUTION INTRAVENOUS ONCE
Status: COMPLETED | OUTPATIENT
Start: 2025-07-16 | End: 2025-07-16

## 2025-07-16 RX ORDER — FAMOTIDINE 20 MG/1
20 TABLET, FILM COATED ORAL ONCE
Status: COMPLETED | OUTPATIENT
Start: 2025-07-16 | End: 2025-07-16

## 2025-07-16 RX ORDER — ACETAMINOPHEN 325 MG/1
650 TABLET ORAL ONCE
Status: COMPLETED | OUTPATIENT
Start: 2025-07-16 | End: 2025-07-16

## 2025-07-16 RX ORDER — CETIRIZINE HYDROCHLORIDE 10 MG/1
10 TABLET ORAL ONCE
Status: COMPLETED | OUTPATIENT
Start: 2025-07-16 | End: 2025-07-16

## 2025-07-16 RX ADMIN — CETIRIZINE HYDROCHLORIDE 10 MG: 10 TABLET, FILM COATED ORAL at 15:13

## 2025-07-16 RX ADMIN — SODIUM CHLORIDE 20 ML/HR: 9 INJECTION, SOLUTION INTRAVENOUS at 15:47

## 2025-07-16 RX ADMIN — ACETAMINOPHEN 650 MG: 325 TABLET ORAL at 15:13

## 2025-07-16 RX ADMIN — FERUMOXYTOL 510 MG: 510 INJECTION INTRAVENOUS at 15:48

## 2025-07-16 RX ADMIN — FAMOTIDINE 20 MG: 20 TABLET, FILM COATED ORAL at 15:13

## 2025-07-21 ENCOUNTER — PATIENT MESSAGE (OUTPATIENT)
Dept: INTERNAL MEDICINE | Facility: CLINIC | Age: 50
End: 2025-07-21
Payer: COMMERCIAL

## 2025-07-22 ENCOUNTER — OFFICE VISIT (OUTPATIENT)
Dept: ENDOCRINOLOGY | Facility: CLINIC | Age: 50
End: 2025-07-22
Payer: COMMERCIAL

## 2025-07-22 VITALS
DIASTOLIC BLOOD PRESSURE: 69 MMHG | HEART RATE: 66 BPM | SYSTOLIC BLOOD PRESSURE: 103 MMHG | BODY MASS INDEX: 27.18 KG/M2 | OXYGEN SATURATION: 98 % | WEIGHT: 200.4 LBS

## 2025-07-22 DIAGNOSIS — R76.8 THYROID ANTIBODY POSITIVE: Primary | ICD-10-CM

## 2025-07-22 DIAGNOSIS — R63.5 WEIGHT GAIN: ICD-10-CM

## 2025-07-22 DIAGNOSIS — E04.9 GOITER: ICD-10-CM

## 2025-07-22 PROCEDURE — 84443 ASSAY THYROID STIM HORMONE: CPT

## 2025-07-22 PROCEDURE — 84445 ASSAY OF TSI GLOBULIN: CPT

## 2025-07-22 PROCEDURE — 86376 MICROSOMAL ANTIBODY EACH: CPT

## 2025-07-22 PROCEDURE — 83520 IMMUNOASSAY QUANT NOS NONAB: CPT

## 2025-07-22 PROCEDURE — 1160F RVW MEDS BY RX/DR IN RCRD: CPT

## 2025-07-22 PROCEDURE — 84439 ASSAY OF FREE THYROXINE: CPT

## 2025-07-22 PROCEDURE — 1159F MED LIST DOCD IN RCRD: CPT

## 2025-07-22 PROCEDURE — 99214 OFFICE O/P EST MOD 30 MIN: CPT

## 2025-07-22 RX ORDER — DEXAMETHASONE 1 MG
TABLET ORAL
Qty: 1 TABLET | Refills: 0 | Status: SHIPPED | OUTPATIENT
Start: 2025-07-22

## 2025-07-22 NOTE — ASSESSMENT & PLAN NOTE
-Will check for endocrine road blocks to weight loss, or conditions that can cause weight gain.   -Will order dexamethasone suppression test to assess for hypercortisolism.  -If initial testing is normal in an individual with a low index of suspicion for Cushing syndrome, it is unlikely that the patient has Cushing syndrome unless it is extremely mild or cyclic. Additional evaluation is not recommended per guidelines unless symptoms progress or cyclic Cushing syndrome is suspected.   -In patients with at least one abnormal test result (which could represent true Cushing syndrome or a false-positive result), additional testing is required. At least two of the three first-line tests should be abnormal to establish the diagnosis of excess cortisol production.   -Will check fasting insulin level to assess for insulin resistance  -Will check thyroid function to ensure TSH is WDL.   -Obesity-related insulin resistance in most patients is based upon clinical findings such as abdominal obesity, hyperglycemia, dyslipidemia and hypertension. Labs per below.

## 2025-07-22 NOTE — PROGRESS NOTES
Chief Complaint   Patient presents with    Hashimoto's Thyroiditis     New patient       HPI  Fei Ojeda is a 50 y.o. male who was referred for initial evaluation of positive TPO antibodies. TPO antibodies were elevated on 6/4/2024 with normal TSH of 2.78.  Most recent TSH was 2.65 on 6/2/2025 with normal free T4 and total T3.  He is not currently on any thyroid medication, nor has he ever been on any thyroid medication in the past.      He complains of recent weight gain and wonders if this is related to positive TPO antibodies. He has gained approximately 30 lbs in the last 3 months. He does report some intermittent headaches, denies any new wide striae.  He does have HIV and is on immunosuppressive medication.  He also has neuropathy and is falling frequently.  He is using a walker to help with this.  He takes iron infusions for iron deficiency anemia.  He had previous thyroid ultrasound last year that showed enlarged right lobe of the thyroid with upper limit of normal left lobe of the thyroid, no nodules present.  He denies any dysphagia, compressive symptoms or changes in his neck.    Birth state: [place]  Previous history of radiation to face/neck: no  Consuming foods high in iodine: some seafood occasionally  Family history of thyroid complications: mother had goiter, no thyroid cancer  Previous imaging: yes 6/2024  Biotin: no    The following portions of the patient's history were reviewed and updated as appropriate: allergies, current medications, past family history, past medical history, past social history, past surgical history, and problem list.    /69 (BP Location: Right arm, Patient Position: Sitting, Cuff Size: Adult)   Pulse 66   Wt 90.9 kg (200 lb 6.4 oz)   SpO2 98%   BMI 27.18 kg/m²    Past Medical History:   Diagnosis Date    Acid reflux     Allergic     Most of my life    Allergic rhinitis     Anemia     I have perncious Anemia and iron deficiency anemia    Anxiety     PTSD,  Severe Panic Attacks, Major depressive disorder, panic disorder    Asthma     Shortness of breath    Chronic bronchitis     Depression     Fibromyalgia, primary     Newly diagnosed last year    Hashimoto's thyroiditis 2024    Elevated thyroid antibiotics    Heart murmur     History of medical problems     Severe depression, PTSD, panic disorder    HIV disease 2012    HL (hearing loss)     Mild hearing loss    Hyperlipidemia     Hypothyroidism 24    Hashimotos thyrooditis    Intolerance, drug     Iron deficiency     Irritable bowel syndrome     I have having adominal issues    Lung nodule     Numbness and tingling     Osteopenia     Upper femoral neck    Sinusitis     Sinsuitis    Trigeminal neuralgia     Vitamin D deficiency      Past Surgical History:   Procedure Laterality Date    COLONOSCOPY      VASCULAR SURGERY Right 2021    VASCULAR SURGERY Left     WISDOM TOOTH EXTRACTION        Family History   Problem Relation Age of Onset    Hypertension Mother     Diabetes Mother     Hyperlipidemia Mother     Arthritis Mother     Obesity Mother     Osteoporosis Mother     Thyroid disease Mother     COPD Father     Asthma Maternal Grandmother     Stroke Paternal Grandmother     Diabetes Paternal Grandmother       Social History     Socioeconomic History    Marital status: Single   Tobacco Use    Smoking status: Former     Current packs/day: 0.00     Average packs/day: 2.0 packs/day for 7.0 years (14.0 ttl pk-yrs)     Types: Cigarettes     Start date: 2010     Quit date: 2017     Years since quittin.5     Passive exposure: Past    Smokeless tobacco: Never   Vaping Use    Vaping status: Never Used   Substance and Sexual Activity    Alcohol use: No    Drug use: Never    Sexual activity: Yes     Partners: Male     Birth control/protection: Condom      Allergies   Allergen Reactions    Azithromycin GI Intolerance    Lyrica [Pregabalin] Other (See Comments)     Sedation, slept for 3 days  after one dose    Erythromycin GI Intolerance and Diarrhea     erythromycin - Gastrointestinal upset      Current Outpatient Medications on File Prior to Visit   Medication Sig Dispense Refill    acyclovir (ZOVIRAX) 5 % ointment APPLY TOPICALLY TO AFFECTED AREA(S) EVERY 4 HOURS AS NEEDED 30 g 0    Azelastine HCl 137 MCG/SPRAY solution 1 spray by Each Nare route Daily.      Bictegravir-Emtricitab-Tenofov (BIKTARVY) -25 MG per tablet Take 1 tablet by mouth Daily.      cyanocobalamin 1000 MCG/ML injection Inject 1 mL into the appropriate muscle as directed by prescriber Every 30 (Thirty) Days. 6 mL 1    DULoxetine (CYMBALTA) 30 MG capsule Take 1 capsule by mouth 2 (Two) Times a Day.      folic acid (FOLVITE) 1 MG tablet Take 1 tablet by mouth Daily. 30 tablet 3    gabapentin (NEURONTIN) 100 MG capsule Take 1 capsule by mouth.      hydrOXYzine pamoate (VISTARIL) 25 MG capsule Take 1 capsule by mouth.      ibuprofen (ADVIL,MOTRIN) 800 MG tablet Take 1 tablet by mouth Every 6 (Six) Hours As Needed for Mild Pain. 120 tablet 1    Linzess 145 MCG capsule capsule Take 1 capsule by mouth Every Morning Before Breakfast.      rOPINIRole (REQUIP) 0.5 MG tablet Take 1 tablet by mouth 3 (Three) Times a Day.      Spiriva Respimat 1.25 MCG/ACT aerosol solution inhaler Inhale 2 puffs Daily.      topiramate (TOPAMAX) 25 MG tablet Take 1 tablet by mouth Daily.      valACYclovir (VALTREX) 500 MG tablet Take 1 tablet by mouth Daily.      vitamin D (ERGOCALCIFEROL) 1.25 MG (69963 UT) capsule capsule Take 1 capsule by mouth once a week 12 capsule 0    calcium carbonate (OS-SHERRI) 600 MG tablet Take 1 tablet by mouth Daily. (Patient not taking: Reported on 7/22/2025)      tiZANidine (ZANAFLEX) 4 MG tablet Take 1 tablet by mouth Every 6 (Six) Hours As Needed. (Patient not taking: Reported on 7/22/2025)       No current facility-administered medications on file prior to visit.      Review of Systems   Constitutional:  Positive for fatigue  and unexpected weight gain.   HENT:  Negative for trouble swallowing and voice change.    Respiratory:  Negative for choking.    Cardiovascular:  Positive for leg swelling.   Endocrine: Positive for cold intolerance.   Neurological:  Positive for headache.   Psychiatric/Behavioral:  Positive for sleep disturbance.      Physical Exam  Vitals reviewed.   Constitutional:       Appearance: Normal appearance.   HENT:      Head: Normocephalic.   Eyes:      Pupils: Pupils are equal, round, and reactive to light.   Neck:      Thyroid: Thyroid mass and thyromegaly present. No thyroid tenderness.   Cardiovascular:      Pulses: Normal pulses.   Pulmonary:      Effort: Pulmonary effort is normal.   Abdominal:      Palpations: Abdomen is soft.   Musculoskeletal:      Cervical back: Normal range of motion.   Skin:     General: Skin is warm and dry.   Neurological:      Mental Status: He is alert and oriented to person, place, and time.   Psychiatric:         Mood and Affect: Mood normal.         Behavior: Behavior normal.       Labs/Imaging    Thyroid US 6/21/24  EXAM:    US Soft Tissues Head and Neck, Thyroid     EXAM DATE:    6/21/2024 1:53 PM     CLINICAL HISTORY:    Elevated thyroid antibodies; R76.8-Other specified abnormal  immunological findings in serum     TECHNIQUE:    Real-time ultrasound scan of the thyroid gland and soft tissues of the  neck with image documentation.     COMPARISON:    No relevant prior studies available.     FINDINGS:    LEFT THYROID LOBE:  Unremarkable.  No enlarged or calcified nodules.    RIGHT THYROID LOBE:  Unremarkable.  No enlarged or calcified nodules.    ISTHMUS:  Unremarkable.  No enlarged or calcified nodules.    LYMPH NODES:  Unremarkable.  No lymphadenopathy.     IMPRESSION:    No acute thyroid findings.     This report was finalized on 6/21/2024 6:59 PM by Dr. Zia Mcrae MD.     CMP  Lab Results   Component Value Date    GLUCOSE 94 03/11/2025    BUN 11 03/11/2025    CREATININE 1.35  "(H) 03/11/2025    EGFRIFNONA >60 03/21/2022    EGFRIFAFRI >60 03/21/2022    BCR 8 (L) 03/11/2025    K 4.6 03/11/2025    CO2 20 03/11/2025    CALCIUM 9.3 03/11/2025    ALBUMIN 4.7 03/11/2025    AGRATIO 1.3 01/15/2025    AST 22 03/11/2025    ALT 19 03/11/2025      CBC w/DIFF   Lab Results   Component Value Date    WBC 8.91 06/25/2025    RBC 4.29 06/25/2025    HGB 13.9 06/25/2025    HCT 42.4 06/25/2025    MCV 98.8 (H) 06/25/2025    MCH 32.4 06/25/2025    MCHC 32.8 06/25/2025    RDW 12.3 06/25/2025    RDWSD 44.8 06/25/2025    MPV 9.7 06/25/2025     06/25/2025    NEUTRORELPCT 57.2 06/25/2025    LYMPHORELPCT 30.1 06/25/2025    MONORELPCT 9.0 06/25/2025    EOSRELPCT 2.6 06/25/2025    BASORELPCT 1.0 06/25/2025    AUTOIGPER 0.1 06/25/2025    NEUTROABS 5.10 06/25/2025    LYMPHSABS 2.68 06/25/2025    MONOSABS 0.80 06/25/2025    EOSABS 0.23 06/25/2025    BASOSABS 0.09 06/25/2025    AUTOIGNUM 0.01 06/25/2025    NRBC 0.0 06/25/2025     TSH  Lab Results   Component Value Date    TSH 2.740 03/11/2025    TSH 2.730 09/03/2024    TSH 2.780 06/04/2024     T4  Lab Results   Component Value Date    FREET4 1.16 03/11/2025    FREET4 1.18 09/03/2024    FREET4 1.16 06/04/2024     No results found for: \"K7ERPDD\"  T3  Lab Results   Component Value Date    T3FREE 2.7 03/11/2025    T3FREE 2.5 06/04/2024     Lab Results   Component Value Date    W1IZKOC 84 03/11/2025     TRAb  No results found for: \"TSURCPAB\"  TPO  Lab Results   Component Value Date    THYROIDAB 136 (H) 06/04/2024       Assessment and Plan  Diagnoses and all orders for this visit:    1. Thyroid antibody positive (Primary)  Assessment & Plan:  -Appears clinically euthyroid.  -TFT's today. If hypothyroid will add medication if indicated.  -Pt has positive TPO antibodies in the setting of normal thyroid function.   -Elevated TPO antibody suggests an increased risk for development of hypothyroidism. If she chooses to try for pregnancy in the future and is found to have a " positive TPO antibody, levothyroxine therapy would be indicated if TSH level is greater than 2.5. If TPO is positive and thyroid function is normal, no treatment is needed as she isn't trying for pregnancy at this time.       Orders:  -     T4, Free  -     TSH  -     Thyroid Peroxidase Antibody  -     Thyroid Stimulating Immunoglobulin  -     Thyrotropin Receptor Antibody    2. Goiter  Assessment & Plan:  -Abnormal neck exam in office today, Will order thyroid ultrasound for further evaluation.     Orders:  -     US Thyroid; Future    3. Weight gain  Assessment & Plan:  -Will check for endocrine road blocks to weight loss, or conditions that can cause weight gain.   -Will order dexamethasone suppression test to assess for hypercortisolism.  -If initial testing is normal in an individual with a low index of suspicion for Cushing syndrome, it is unlikely that the patient has Cushing syndrome unless it is extremely mild or cyclic. Additional evaluation is not recommended per guidelines unless symptoms progress or cyclic Cushing syndrome is suspected.   -In patients with at least one abnormal test result (which could represent true Cushing syndrome or a false-positive result), additional testing is required. At least two of the three first-line tests should be abnormal to establish the diagnosis of excess cortisol production.   -Will check fasting insulin level to assess for insulin resistance  -Will check thyroid function to ensure TSH is WDL.   -Obesity-related insulin resistance in most patients is based upon clinical findings such as abdominal obesity, hyperglycemia, dyslipidemia and hypertension. Labs per below.    Orders:  -     Insulin, Total; Future  -     Dexamethasone Level, Serum; Future  -     dexAMETHasone (DECADRON) 1 MG tablet; Take 1 mg tablet between 11 pm and midnight, then get fasting labs done the next day at 8 am.  Dispense: 1 tablet; Refill: 0  -     Cortisol; Future       Return in about 3 months  (around 10/22/2025). The patient was instructed to contact the clinic with any interval questions or concerns.    Please note that portions of this document were completed with a voice recognition program. Efforts were made to edit the dictations, but occasionally words are mis-transcribed.  This document has been electronically signed by ASHWINI Delgado  July 22, 2025 14:46 EDT

## 2025-07-23 LAB
T4 FREE SERPL-MCNC: 1.24 NG/DL (ref 0.92–1.68)
THYROPEROXIDASE AB SERPL-ACNC: 182 IU/ML (ref 0–34)
TSH RECEP AB SER-ACNC: <1.1 IU/L (ref 0–1.75)
TSH SERPL DL<=0.05 MIU/L-ACNC: 2.08 UIU/ML (ref 0.27–4.2)
TSI SER-ACNC: <0.1 IU/L (ref 0–0.55)

## 2025-07-31 ENCOUNTER — HOSPITAL ENCOUNTER (OUTPATIENT)
Facility: HOSPITAL | Age: 50
Discharge: HOME | End: 2025-07-31
Payer: COMMERCIAL

## 2025-07-31 DIAGNOSIS — E04.9 GOITER: ICD-10-CM

## 2025-07-31 PROCEDURE — 76536 US EXAM OF HEAD AND NECK: CPT

## 2025-07-31 PROCEDURE — 76536 US EXAM OF HEAD AND NECK: CPT | Performed by: RADIOLOGY

## 2025-08-17 DIAGNOSIS — K13.70 ORAL LESION: ICD-10-CM

## 2025-08-18 RX ORDER — NYSTATIN 100000 [USP'U]/ML
SUSPENSION ORAL
Qty: 473 ML | Refills: 0 | OUTPATIENT
Start: 2025-08-18